# Patient Record
Sex: MALE | Race: ASIAN | Employment: OTHER | URBAN - METROPOLITAN AREA
[De-identification: names, ages, dates, MRNs, and addresses within clinical notes are randomized per-mention and may not be internally consistent; named-entity substitution may affect disease eponyms.]

---

## 2017-01-02 ENCOUNTER — THERAPY VISIT (OUTPATIENT)
Dept: PHYSICAL THERAPY | Facility: CLINIC | Age: 67
End: 2017-01-02
Payer: COMMERCIAL

## 2017-01-02 DIAGNOSIS — M54.2 CERVICALGIA: Primary | ICD-10-CM

## 2017-01-02 PROCEDURE — 97110 THERAPEUTIC EXERCISES: CPT | Mod: GP | Performed by: PHYSICAL THERAPIST

## 2017-01-02 PROCEDURE — 97161 PT EVAL LOW COMPLEX 20 MIN: CPT | Mod: GP | Performed by: PHYSICAL THERAPIST

## 2017-01-02 NOTE — PROGRESS NOTES
[facility] Initial Evaluation    Subjective:    Mati Pulliam is a 66 year old male with a cervical spine condition.  Condition occurred with:  Insidious onset.  Condition occurred: for unknown reasons.  This is a new condition  12/7/2016.    Patient reports pain:  Cervical left side.  Radiates to: prior to today he had pain into his L shoulder.  Pain is described as sharp and is intermittent and reported as 3/10.  Associated symptoms:  Loss of motion/stiffness. Worse during: varies throughout day.  Symptoms are exacerbated by lying down, rotating head and looking up or down Relieved by: aspirin.  Since onset symptoms are gradually improving.    Previous treatment includes chiropractic.  There was moderate improvement following previous treatment.  General health as reported by patient is good.  Pertinent medical history includes:  High blood pressure, heart problems, stroke and diabetes (coronary bypass surgery, stroke (2009) that affected R side).  Medical allergies: yes.  Other surgeries include:  Heart surgery and other (coronary bypass, hernia).    Current occupation is retired.        Barriers include:  None as reported by the patient.    Red flags:  None as reported by the patient.                      Objective:    System    Physical Exam    [facility] Cervical Evaluation    Posture:  Sitting: fair  Standing: fair  Protruding Head: yes        Movement Loss:    Flexion (Flex): nil  Retraction (RET): min and pain  Extension (EXT): major and pain  Lateral Flexion Right (LF R): major and pain  Lateral Flexion Left (LF L): major and pain  Rotation Right (ROT R): nil  Rotation Left (ROT L): mod and pain  Test Movements:      RET: During: produces  After: no effect  Mechanical Response: IncROM  Repeat RET: During: produces  After: no effect  Mechanical Response: IncROM  RET EXT: During: produces  After: better  Mechanical Response: IncROM  Repeat RET EXT: During: produces  After: better   Mechanical Response: IncROM            LF L: During: produces  After: worse  Mechanical Response: no effect  Repeat LF L: During: produces  After: worse  Mechanical Response: no effect            Conclusion: derangement  Principle of Treatment:      Extension: repeated cervical retraction/extension                                               ROS    Assessment/Plan:      Patient is a 66 year old male with cervical complaints.    Patient has the following significant findings with corresponding treatment plan.                Diagnosis 1:  Cervical pain secondary to derangment  Pain -  directional preference exercise and home program  Decreased ROM/flexibility - manual therapy, therapeutic exercise and home program  Decreased function - therapeutic activities and home program  Impaired posture - neuro re-education and home program    Therapy Evaluation Codes:   1) History comprised of:   Personal factors that impact the plan of care:      None.    Comorbidity factors that impact the plan of care are:      None.     Medications impacting care: None.  2) Examination of Body Systems comprised of:   Body structures and functions that impact the plan of care:      Cervical spine.   Activity limitations that impact the plan of care are:      none.   Clinical presentation characteristics are:    Stable/Uncomplicated.  3) Presentation comprised of:   Presentation scored as Low complexity with uncomplicated characteristics..  4) Decision-Making    Low complexity using standardized patient assessment instrument and/or measureable assessment of functional outcome.  Cumulative Therapy Evaluation is: Low complexity.    Previous and current functional limitations:  (See Goal Flow Sheet for this information)    Short term and Long term goals: (See Goal Flow Sheet for this information)     Communication ability:  Patient appears to be able to clearly communicate and understand verbal and written communication and follow directions  correctly.  Treatment Explanation - The following has been discussed with the patient:   RX ordered/plan of care  Anticipated outcomes  Possible risks and side effects  This patient would benefit from PT intervention to resume normal activities.   Rehab potential is excellent.    Frequency:  2 X week, once daily  Duration:  for 3 weeks  Discharge Plan:  Achieve all LTG.  Independent in home treatment program.  Reach maximal therapeutic benefit.    Please refer to the daily flowsheet for treatment today, total treatment time and time spent performing 1:1 timed codes.

## 2017-01-03 DIAGNOSIS — I10 BENIGN ESSENTIAL HYPERTENSION: Primary | ICD-10-CM

## 2017-01-03 RX ORDER — EPLERENONE 25 MG/1
25 TABLET, FILM COATED ORAL DAILY
Qty: 90 TABLET | Refills: 3 | Status: SHIPPED | OUTPATIENT
Start: 2017-01-03 | End: 2017-11-30

## 2017-01-03 NOTE — TELEPHONE ENCOUNTER
Pts wife called stating that they need an order for Eplerenone to be sent to [facility] mail delivery pharmacy. Refilled Eplerenone     12/13/16 OV with Christina   He also mentioned in passing breast tenderness.  I am sure this is the Aldactone.  He has mild gynecomastia.  I have taken the liberty of switching Aldactone to Inspra at 25 mg a day, and I am going to have him come back in 2 weeks for another blood pressure check to see if the blood pressure numbers look better and also repeat BMP on the new medicines.     12/30/16 OV with [facility]   ASSESSMENT AND PLAN:    1.  Coronary artery disease with previous coronary artery bypass grafting x4.  He is doing well without ischemic symptoms right now.  His last nuclear stress test showed no new areas of concern.  He is on medical management with Crestor 20, a low dose aspirin, beta blocker and ARB.    2.  Hypertension.  His blood pressure is elevated today.  He is currently taking spironolactone 25, losartan 100, amlodipine 10, carvedilol 6.25.  His primary has started him on chlorthalidone; initially it was 12.5 mg twice weekly.  Apparently it was up to 25 mg twice weekly, but he is not taking it at all due to the cost.  Likewise, he has not yet started allopurinol.  He plans to start both of these medications in January.  I have asked him to call my nurse early next week to have her e-script a 90-day supply to his new [facility] insurance.  After he starts the new medication, he should get a BMP about 1 week later.  I would like to see him in a month to see how his blood pressure has responded.      Pt has OV with [facility] 2/10/17. BMP ordered.

## 2017-01-05 ENCOUNTER — THERAPY VISIT (OUTPATIENT)
Dept: PHYSICAL THERAPY | Facility: CLINIC | Age: 67
End: 2017-01-05
Payer: COMMERCIAL

## 2017-01-05 DIAGNOSIS — M54.2 CERVICALGIA: Primary | ICD-10-CM

## 2017-01-05 PROCEDURE — 97110 THERAPEUTIC EXERCISES: CPT | Mod: GP | Performed by: PHYSICAL THERAPIST

## 2017-01-05 PROCEDURE — 97530 THERAPEUTIC ACTIVITIES: CPT | Mod: GP | Performed by: PHYSICAL THERAPIST

## 2017-01-05 PROCEDURE — 97140 MANUAL THERAPY 1/> REGIONS: CPT | Mod: GP | Performed by: PHYSICAL THERAPIST

## 2017-01-10 ENCOUNTER — THERAPY VISIT (OUTPATIENT)
Dept: PHYSICAL THERAPY | Facility: CLINIC | Age: 67
End: 2017-01-10
Payer: COMMERCIAL

## 2017-01-10 DIAGNOSIS — M54.2 CERVICALGIA: Primary | ICD-10-CM

## 2017-01-10 PROCEDURE — 97140 MANUAL THERAPY 1/> REGIONS: CPT | Mod: GP | Performed by: PHYSICAL THERAPIST

## 2017-01-10 PROCEDURE — 97110 THERAPEUTIC EXERCISES: CPT | Mod: GP | Performed by: PHYSICAL THERAPIST

## 2017-01-11 NOTE — TELEPHONE ENCOUNTER
Pt has received Inspra medication from pharmacy and has started it per wife.  Have asked Pt call if any issues with paying copay for medication. SEDA Hines RN

## 2017-01-11 NOTE — TELEPHONE ENCOUNTER
"Writer spoke to [facility] about bringing this question to Dr. Vasquez- Received Rx request from [facility] stating that patient would like to switch Eplerenone 25 mg tab daily to Chlorthalidone as this would save patient over $652 a year.     Dr. Vasquez 12/13/16  \"He also mentioned in passing breast tenderness.  I am sure this is the Aldactone.  He has mild gynecomastia.  I have taken the liberty of switching Aldactone to Inspra at 25 mg a day, and I am going to have him come back in 2 weeks for another blood pressure check to see if the blood pressure numbers look better and also repeat BMP on the new medicines.\"    Will route to Estela HAGAN  "

## 2017-01-11 NOTE — TELEPHONE ENCOUNTER
Received Rx request from [facility] stating that patient would like to switch Eplerenone 25 mg tab daily to Chlorthalidone as this would save patient over $652 a year.     Will route to My NP to see if pt would be able to switch medications.

## 2017-01-12 ENCOUNTER — TELEPHONE (OUTPATIENT)
Dept: CARDIOLOGY | Facility: CLINIC | Age: 67
End: 2017-01-12

## 2017-01-12 DIAGNOSIS — I63.9 CEREBROVASCULAR ACCIDENT (H): Primary | ICD-10-CM

## 2017-01-12 NOTE — TELEPHONE ENCOUNTER
Spoke with wife and Pt and they are ok with the  $ 130 every 3 month co-pay. Will attempt prior Auth on eplerenone. SEDA Hines rN

## 2017-01-17 ENCOUNTER — THERAPY VISIT (OUTPATIENT)
Dept: PHYSICAL THERAPY | Facility: CLINIC | Age: 67
End: 2017-01-17
Payer: COMMERCIAL

## 2017-01-17 DIAGNOSIS — M54.2 CERVICALGIA: Primary | ICD-10-CM

## 2017-01-17 PROCEDURE — 97110 THERAPEUTIC EXERCISES: CPT | Mod: GP | Performed by: PHYSICAL THERAPIST

## 2017-01-17 PROCEDURE — 97140 MANUAL THERAPY 1/> REGIONS: CPT | Mod: GP | Performed by: PHYSICAL THERAPIST

## 2017-01-21 NOTE — TELEPHONE ENCOUNTER
Per [employer], medication is available without authorization. Called [employer], but unable to run medication. But put a note in patients file for future references.

## 2017-01-24 ENCOUNTER — THERAPY VISIT (OUTPATIENT)
Dept: PHYSICAL THERAPY | Facility: CLINIC | Age: 67
End: 2017-01-24
Payer: COMMERCIAL

## 2017-01-24 DIAGNOSIS — M54.2 CERVICALGIA: Primary | ICD-10-CM

## 2017-01-24 PROCEDURE — 97110 THERAPEUTIC EXERCISES: CPT | Mod: GP | Performed by: PHYSICAL THERAPIST

## 2017-01-31 DIAGNOSIS — I63.9 CEREBROVASCULAR ACCIDENT (H): ICD-10-CM

## 2017-01-31 LAB
ANION GAP SERPL CALCULATED.3IONS-SCNC: 12 MMOL/L (ref 3–14)
BUN SERPL-MCNC: 13 MG/DL (ref 7–30)
CALCIUM SERPL-MCNC: 9.6 MG/DL (ref 8.5–10.1)
CHLORIDE SERPL-SCNC: 96 MMOL/L (ref 94–109)
CO2 SERPL-SCNC: 26 MMOL/L (ref 20–32)
CREAT SERPL-MCNC: 0.86 MG/DL (ref 0.66–1.25)
GFR SERPL CREATININE-BSD FRML MDRD: 90 ML/MIN/1.7M2
GLUCOSE SERPL-MCNC: 124 MG/DL (ref 70–99)
POTASSIUM SERPL-SCNC: 4.1 MMOL/L (ref 3.4–5.3)
SODIUM SERPL-SCNC: 134 MMOL/L (ref 133–144)

## 2017-01-31 PROCEDURE — 36415 COLL VENOUS BLD VENIPUNCTURE: CPT | Performed by: INTERNAL MEDICINE

## 2017-01-31 PROCEDURE — 80048 BASIC METABOLIC PNL TOTAL CA: CPT | Performed by: INTERNAL MEDICINE

## 2017-02-01 ENCOUNTER — MYC MEDICAL ADVICE (OUTPATIENT)
Dept: FAMILY MEDICINE | Facility: CLINIC | Age: 67
End: 2017-02-01

## 2017-02-02 ENCOUNTER — DOCUMENTATION ONLY (OUTPATIENT)
Dept: ENDOCRINOLOGY | Facility: CLINIC | Age: 67
End: 2017-02-02

## 2017-02-02 NOTE — PROGRESS NOTES
GRADE study visit    Patient is here for 24 month GRADE study visit. Patient is doing well and continues on Metformin 1000 mg bid in addition to randomized treatment of glimepiride.  He is currently taking 1 mg daily.   No hypoglycemic episodes recently. He otherwise has been feeling well and in his usual state of health.     Lab results:   A1c:  6.9%  LDL:56  Creatinine: 0.77  Microalbumin: 138.89- already on max dose ARB and multiple antihypertensive medications.  EKG: No changes from previous.     Plan:   1. Diabetes- A1c at target and his no longer having hypoglycemia.  No changes.  May need to cut back amaryl if he becomes more physically active this spring.   2. Follow up in 3 months, sooner with concerns.     Lulu Cortés PA-C, MPAS   [facility]  Department of Medicine  Division of Endocrinology and Diabetes

## 2017-02-10 ENCOUNTER — OFFICE VISIT (OUTPATIENT)
Dept: CARDIOLOGY | Facility: CLINIC | Age: 67
End: 2017-02-10
Attending: NURSE PRACTITIONER
Payer: COMMERCIAL

## 2017-02-10 VITALS
HEIGHT: 64 IN | DIASTOLIC BLOOD PRESSURE: 78 MMHG | SYSTOLIC BLOOD PRESSURE: 140 MMHG | HEART RATE: 56 BPM | WEIGHT: 137.4 LBS | BODY MASS INDEX: 23.46 KG/M2

## 2017-02-10 DIAGNOSIS — I25.10 CORONARY ARTERY DISEASE INVOLVING NATIVE CORONARY ARTERY OF NATIVE HEART WITHOUT ANGINA PECTORIS: ICD-10-CM

## 2017-02-10 DIAGNOSIS — I10 BENIGN ESSENTIAL HYPERTENSION: ICD-10-CM

## 2017-02-10 PROCEDURE — 99214 OFFICE O/P EST MOD 30 MIN: CPT | Performed by: NURSE PRACTITIONER

## 2017-02-10 NOTE — LETTER
2/10/2017    Hai Mckee MD  [facility]   [address]  [place] [address]    RE: Mati Pulliam       Dear Colleague,    I had the pleasure of seeing Mati Pulliam today in Cardiology Clinic.  His wife, Randell, was unable to make it because fortunately he just got a new job after being out of work for quite some time.  He is a pleasant 66-year-old patient who follows with Dr. Vasquez.  He has a history of coronary artery bypass grafting x4 after an abnormal stress test for exertional chest pain.  Postop, he had a large left MCA stroke, leaving him with aphasia and right-sided immobility from which he has almost completely recovered.  A recent nuclear stress test for atypical chest discomfort showed no evidence of ischemia or infarct, no wall motion abnormality and a normal ejection fraction.  He saw Dr. Vasquez and complained of gynecomastia and so was started on eplerenone.  His blood pressure has been a bit high.  Dr. Mckee recently started him on biweekly chlorthalidone.  His most recent carotid ultrasound showed no significant disease and minimal calcified atherosclerotic plaque.      When I saw him at our last visit to follow up on starting eplerenone and stopping spironolactone, he had not actually made any changes to his medications because of insurance and cost.  However, starting 01/01 he was able to get the medications covered, and so he stopped the spironolactone and started the eplerenone and started the chlorthalidone.  His repeat labs look good today:  Sodium 134, potassium 4.1, creatinine 0.86 and GFR of 90.  His blood pressure at home has been in the 130s and 140s.  He is a diabetic.      PHYSICAL EXAMINATION:   GENERAL:  A 66-year-old male, appears comfortable.   VITAL SIGNS:  Blood pressure 140/78, heart rate 56, weight 137 pounds, BMI is 24.   LUNGS:  Clear throughout to auscultation.   CARDIAC:  Rate regular, normal S1, S2.   EXTREMITIES:  No lower extremity edema.       Outpatient Encounter Prescriptions as of 2/10/2017   Medication Sig Dispense Refill     ranitidine (ZANTAC) 150 MG tablet Take 1 tablet (150 mg) by mouth 2 times daily 180 tablet 2     nitroglycerin (NITROSTAT) 0.4 MG sublingual tablet Place 1 tablet (0.4 mg) under the tongue See Admin Instructions for chest pain 25 tablet 3     amLODIPine (NORVASC) 10 MG tablet Take 1 tablet (10 mg) by mouth daily 90 tablet 2     clopidogrel (PLAVIX) 75 MG tablet Take 1 tablet (75 mg) by mouth daily 90 tablet 2     losartan (COZAAR) 100 MG tablet Take 1 tablet (100 mg) by mouth daily 90 tablet 2     carvedilol (COREG) 6.25 MG tablet Take 1 tablet (6.25 mg) by mouth 2 times daily (with meals) 180 tablet 2     rosuvastatin (CRESTOR) 20 MG tablet Take 1 tablet (20 mg) by mouth daily 90 tablet 2     eplerenone (INSPRA) 25 MG tablet Take 1 tablet (25 mg) by mouth daily 90 tablet 3     chlorthalidone (HYGROTON) 25 MG tablet Take 0.5 tablets (12.5 mg) by mouth twice a week 31 tablet 11     Multiple Vitamins-Minerals (AIRBORNE PO) Take by mouth daily as needed       glimepiride (AMARYL) 1 MG tablet Take 1 mg by mouth every morning (before breakfast)        metFORMIN (GLUCOPHAGE) 500 MG tablet Take 1,000 mg by mouth 2 times daily (with meals)       STUDY MEDICATION Metformin 1000 mg bid  Glimepiride 1 mg daily    Medication provided by GRADE study only.   Coordinator: Raymundo Coy -[id number]  PI: Shanon Guy -[id number]       FOLIC ACID PO Take 800 mcg by mouth every other day       fluticasone (FLONASE) 50 MCG/ACT nasal spray Spray 1-2 sprays into both nostrils daily (Patient taking differently: Spray 1-2 sprays into both nostrils daily PRN) 1 Package 11     blood glucose monitoring (ONE TOUCH ULTRA MINI) meter device kit Use to test blood sugar once daily or as directed. 1 kit      ONETOUCH DELICA LANCETS 33G MISC 1 Device daily 100 each prn     blood glucose (ONE TOUCH ULTRA) test strip Use to test blood sugars once daily as  directed. 100 strip PRN     Cholecalciferol (VITAMIN D) 2000 UNITS CAPS Take 1 capsule by mouth every other day       OVER-THE-COUNTER Beplex Forte 260  mcg every OTHER day       ASPIRIN 81 MG OR TABS ONE DAILY 100 3     No facility-administered encounter medications on file as of 2/10/2017.        ASSESSMENT AND PLAN:   1.  Coronary artery disease, status post CABG.  He is not having any ischemic symptoms and should continue his medical management with statin, aspirin and beta blockade.   2.  Hypertension.  He was having gynecomastia with the spironolactone, and so Dr. Vasquez switched him to eplerenone 25 mg daily.  His labs look okay.  His blood pressure is about the same.  He tells me that earlier this week, Dr. Mckee increased his dose of chlorthalidone from 12.5 mg twice a week to 25 mg twice a week.  I recommend that he follow up with her for further blood pressure management and that he see Dr. Vasquez in 1-year followup.      Again, thank you for allowing me to participate in the care of your patient.      Sincerely,    LIZA Prater [facility]
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
## 2017-02-11 NOTE — PROGRESS NOTES
HISTORY OF PRESENT ILLNESS:  I had the pleasure of seeing Mati Pulliam today in Cardiology Clinic.  His wife, Randell, was unable to make it because fortunately he just got a new job after being out of work for quite some time.  He is a pleasant 66-year-old patient who follows with Dr. Vasquez.  He has a history of coronary artery bypass grafting x4 after an abnormal stress test for exertional chest pain.  Postop, he had a large left MCA stroke, leaving him with aphasia and right-sided immobility from which he has almost completely recovered.  A recent nuclear stress test for atypical chest discomfort showed no evidence of ischemia or infarct, no wall motion abnormality and a normal ejection fraction.  He saw Dr. Vasquez and complained of gynecomastia and so was started on eplerenone.  His blood pressure has been a bit high.  Dr. Mckee recently started him on biweekly chlorthalidone.  His most recent carotid ultrasound showed no significant disease and minimal calcified atherosclerotic plaque.      When I saw him at our last visit to follow up on starting eplerenone and stopping spironolactone, he had not actually made any changes to his medications because of insurance and cost.  However, starting 01/01 he was able to get the medications covered, and so he stopped the spironolactone and started the eplerenone and started the chlorthalidone.  His repeat labs look good today:  Sodium 134, potassium 4.1, creatinine 0.86 and GFR of 90.  His blood pressure at home has been in the 130s and 140s.  He is a diabetic.      PHYSICAL EXAMINATION:   GENERAL:  A 66-year-old male, appears comfortable.   VITAL SIGNS:  Blood pressure 140/78, heart rate 56, weight 137 pounds, BMI is 24.   LUNGS:  Clear throughout to auscultation.   CARDIAC:  Rate regular, normal S1, S2.   EXTREMITIES:  No lower extremity edema.      ASSESSMENT AND PLAN:   1.  Coronary artery disease, status post CABG.  He is not having any ischemic symptoms and should  continue his medical management with statin, aspirin and beta blockade.   2.  Hypertension.  He was having gynecomastia with the spironolactone, and so Dr. Vasquez switched him to eplerenone 25 mg daily.  His labs look okay.  His blood pressure is about the same.  He tells me that earlier this week, Dr. Mckee increased his dose of chlorthalidone from 12.5 mg twice a week to 25 mg twice a week.  I recommend that he follow up with her for further blood pressure management and that he see Dr. Vasquez in 1-year followup.      LIZA Reynoso, ALE              D: 02/10/2017 16:51   T: 02/10/2017 22:34   MT: JUAN C      Name:     CORINE BILLY   MRN:      [id number]        Account:      [id number]   :      1950           Service Date: 02/10/2017      Document: [id number]

## 2017-05-18 ENCOUNTER — TELEPHONE (OUTPATIENT)
Dept: ENDOCRINOLOGY | Facility: CLINIC | Age: 67
End: 2017-05-18

## 2017-05-18 LAB — HBA1C MFR BLD: 6.6 % (ref 4.3–6)

## 2017-06-08 ENCOUNTER — OFFICE VISIT (OUTPATIENT)
Dept: FAMILY MEDICINE | Facility: CLINIC | Age: 67
End: 2017-06-08
Payer: COMMERCIAL

## 2017-06-08 VITALS
BODY MASS INDEX: 22.53 KG/M2 | DIASTOLIC BLOOD PRESSURE: 66 MMHG | HEART RATE: 57 BPM | WEIGHT: 132 LBS | SYSTOLIC BLOOD PRESSURE: 114 MMHG | HEIGHT: 64 IN | OXYGEN SATURATION: 97 %

## 2017-06-08 DIAGNOSIS — Z12.11 SPECIAL SCREENING FOR MALIGNANT NEOPLASMS, COLON: ICD-10-CM

## 2017-06-08 DIAGNOSIS — K21.9 GASTROESOPHAGEAL REFLUX DISEASE WITHOUT ESOPHAGITIS: ICD-10-CM

## 2017-06-08 DIAGNOSIS — E11.59 TYPE 2 DIABETES MELLITUS WITH VASCULAR DISEASE (H): Primary | ICD-10-CM

## 2017-06-08 DIAGNOSIS — I10 ESSENTIAL HYPERTENSION: ICD-10-CM

## 2017-06-08 DIAGNOSIS — Z95.1 S/P CABG (CORONARY ARTERY BYPASS GRAFT): ICD-10-CM

## 2017-06-08 DIAGNOSIS — E78.5 HYPERLIPIDEMIA LDL GOAL <70: ICD-10-CM

## 2017-06-08 DIAGNOSIS — Z12.11 SCREEN FOR COLON CANCER: ICD-10-CM

## 2017-06-08 LAB
ANION GAP SERPL CALCULATED.3IONS-SCNC: 7 MMOL/L (ref 3–14)
BUN SERPL-MCNC: 12 MG/DL (ref 7–30)
CALCIUM SERPL-MCNC: 9.3 MG/DL (ref 8.5–10.1)
CHLORIDE SERPL-SCNC: 99 MMOL/L (ref 94–109)
CHOLEST SERPL-MCNC: 106 MG/DL
CO2 SERPL-SCNC: 30 MMOL/L (ref 20–32)
CREAT SERPL-MCNC: 0.94 MG/DL (ref 0.66–1.25)
CREAT UR-MCNC: 114 MG/DL
ERYTHROCYTE [DISTWIDTH] IN BLOOD BY AUTOMATED COUNT: 13.8 % (ref 10–15)
GFR SERPL CREATININE-BSD FRML MDRD: 80 ML/MIN/1.7M2
GLUCOSE SERPL-MCNC: 147 MG/DL (ref 70–99)
HCT VFR BLD AUTO: 40.1 % (ref 40–53)
HDLC SERPL-MCNC: 39 MG/DL
HGB BLD-MCNC: 13.2 G/DL (ref 13.3–17.7)
LDLC SERPL CALC-MCNC: 35 MG/DL
MCH RBC QN AUTO: 29.6 PG (ref 26.5–33)
MCHC RBC AUTO-ENTMCNC: 32.9 G/DL (ref 31.5–36.5)
MCV RBC AUTO: 90 FL (ref 78–100)
MICROALBUMIN UR-MCNC: 138 MG/L
MICROALBUMIN/CREAT UR: 121.05 MG/G CR (ref 0–17)
NONHDLC SERPL-MCNC: 67 MG/DL
PLATELET # BLD AUTO: 217 10E9/L (ref 150–450)
POTASSIUM SERPL-SCNC: 3.9 MMOL/L (ref 3.4–5.3)
RBC # BLD AUTO: 4.46 10E12/L (ref 4.4–5.9)
SODIUM SERPL-SCNC: 136 MMOL/L (ref 133–144)
TRIGL SERPL-MCNC: 159 MG/DL
TSH SERPL DL<=0.005 MIU/L-ACNC: 1.48 MU/L (ref 0.4–4)
WBC # BLD AUTO: 7.1 10E9/L (ref 4–11)

## 2017-06-08 PROCEDURE — 85027 COMPLETE CBC AUTOMATED: CPT | Performed by: INTERNAL MEDICINE

## 2017-06-08 PROCEDURE — 80048 BASIC METABOLIC PNL TOTAL CA: CPT | Performed by: INTERNAL MEDICINE

## 2017-06-08 PROCEDURE — 99214 OFFICE O/P EST MOD 30 MIN: CPT | Performed by: INTERNAL MEDICINE

## 2017-06-08 PROCEDURE — 80061 LIPID PANEL: CPT | Performed by: INTERNAL MEDICINE

## 2017-06-08 PROCEDURE — 84443 ASSAY THYROID STIM HORMONE: CPT | Performed by: INTERNAL MEDICINE

## 2017-06-08 PROCEDURE — 82043 UR ALBUMIN QUANTITATIVE: CPT | Performed by: INTERNAL MEDICINE

## 2017-06-08 PROCEDURE — 36415 COLL VENOUS BLD VENIPUNCTURE: CPT | Performed by: INTERNAL MEDICINE

## 2017-06-08 PROCEDURE — 99207 C FOOT EXAM  NO CHARGE: CPT | Performed by: INTERNAL MEDICINE

## 2017-06-08 NOTE — LETTER
[place] [address] [address] [facility] [address]  [place] [address]  [phone]                                                                                               Date: 6/9/2017    Mati Pulliam                                                                               [address] [facility] [address]              Dear Christine Thorne is a copy of your results.      It was a pleasure to see you at your last appointment. If you have any questions, please feel free to call myself or my nurse at [phone].          Sincerely,    Hai Mckee MD  Results for orders placed or performed in visit on 06/08/17   Basic metabolic panel   Result Value Ref Range    Sodium 136 133 - 144 mmol/L    Potassium 3.9 3.4 - 5.3 mmol/L    Chloride 99 94 - 109 mmol/L    Carbon Dioxide 30 20 - 32 mmol/L    Anion Gap 7 3 - 14 mmol/L    Glucose 147 (H) 70 - 99 mg/dL    Urea Nitrogen 12 7 - 30 mg/dL    Creatinine 0.94 0.66 - 1.25 mg/dL    GFR Estimate 80 >60 mL/min/1.7m2    GFR Estimate If Black >90   GFR Calc   >60 mL/min/1.7m2    Calcium 9.3 8.5 - 10.1 mg/dL   Lipid panel reflex to direct LDL   Result Value Ref Range    Cholesterol 106 <200 mg/dL    Triglycerides 159 (H) <150 mg/dL    HDL Cholesterol 39 (L) >39 mg/dL    LDL Cholesterol Calculated 35 <100 mg/dL    Non HDL Cholesterol 67 <130 mg/dL   CBC with platelets   Result Value Ref Range    WBC 7.1 4.0 - 11.0 10e9/L    RBC Count 4.46 4.4 - 5.9 10e12/L    Hemoglobin 13.2 (L) 13.3 - 17.7 g/dL    Hematocrit 40.1 40.0 - 53.0 %    MCV 90 78 - 100 fl    MCH 29.6 26.5 - 33.0 pg    MCHC 32.9 31.5 - 36.5 g/dL    RDW 13.8 10.0 - 15.0 %    Platelet Count 217 150 - 450 10e9/L   Albumin Random Urine Quantitative   Result Value Ref Range    Creatinine Urine 114 mg/dL    Albumin Urine mg/L 138 mg/L    Albumin Urine mg/g Cr 121.05 (H) 0 - 17 mg/g Cr   TSH with free T4 reflex   Result Value Ref Range    TSH 1.48 0.40 - 4.00 mU/L
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
## 2017-06-08 NOTE — NURSING NOTE
"Chief Complaint   Patient presents with     Diabetes     Hypertension     Lipids     Gastrophageal Reflux       Initial /66 (BP Location: Left arm, Patient Position: Chair, Cuff Size: Adult Regular)  Pulse 57  Ht 5' 3.5\" (1.613 m)  Wt 132 lb (59.9 kg)  SpO2 97%  BMI 23.02 kg/m2 Estimated body mass index is 23.02 kg/(m^2) as calculated from the following:    Height as of this encounter: 5' 3.5\" (1.613 m).    Weight as of this encounter: 132 lb (59.9 kg).  Medication Reconciliation: complete  "

## 2017-06-08 NOTE — MR AVS SNAPSHOT
After Visit Summary   6/8/2017    Mati Pulliam    MRN: [id number]           Patient Information     Date Of Birth          1950        Visit Information        Provider Department      6/8/2017 3:30 PM Hai Mckee MD [facility]        Today's Diagnoses     Type 2 diabetes mellitus with vascular disease (H)    -  1    S/P CABG (coronary artery bypass graft)        Gastroesophageal reflux disease without esophagitis        Essential hypertension        Special screening for malignant neoplasms, colon        Screen for colon cancer        Hyperlipidemia LDL goal <70           Follow-ups after your visit        Future tests that were ordered for you today     Open Future Orders        Priority Expected Expires Ordered    Fecal colorectal cancer screen (FIT) Routine 6/29/2017 8/31/2017 6/8/2017            Who to contact     If you have questions or need follow up information about today's clinic visit or your schedule please contact [facility] directly at [phone].  Normal or non-critical lab and imaging results will be communicated to you by [employer] System[employer], letter or phone within 4 business days after the clinic has received the results. If you do not hear from us within 7 days, please contact the clinic through Telligent Systemshart or phone. If you have a critical or abnormal lab result, we will notify you by phone as soon as possible.  Submit refill requests through [email or web address] or call your pharmacy and they will forward the refill request to us. Please allow 3 business days for your refill to be completed.          Additional Information About Your Visit        MyChart Information     [email or web address] gives you secure access to your electronic health record. If you see a primary care provider, you can also send messages to your care team and make appointments. If you have questions, please call your primary care clinic.  If you do not have a primary care provider, please call [phone] and they  "will assist you.        Care EveryWhere ID     This is your Care EveryWhere ID. This could be used by other organizations to access your [place] medical records  [id number]        Your Vitals Were     Pulse Height Pulse Oximetry BMI (Body Mass Index)          57 5' 3.5\" (1.613 m) 97% 23.02 kg/m2         Blood Pressure from Last 3 Encounters:   06/08/17 114/66   02/10/17 140/78   12/30/16 134/62    Weight from Last 3 Encounters:   06/08/17 132 lb (59.9 kg)   02/10/17 137 lb 6.4 oz (62.3 kg)   12/30/16 133 lb 6.4 oz (60.5 kg)              We Performed the Following     Albumin Random Urine Quantitative     Basic metabolic panel     CBC with platelets     FOOT EXAM  NO CHARGE [58674.114]     Lipid panel reflex to direct LDL     TSH with free T4 reflex          Today's Medication Changes          These changes are accurate as of: 6/8/17  3:45 PM.  If you have any questions, ask your nurse or doctor.               These medicines have changed or have updated prescriptions.        Dose/Directions    fluticasone 50 MCG/ACT spray   Commonly known as:  FLONASE   This may have changed:  additional instructions   Used for:  Cough        Dose:  1-2 spray   Spray 1-2 sprays into both nostrils daily   Quantity:  1 Package   Refills:  11         Stop taking these medicines if you haven't already. Please contact your care team if you have questions.     [place] PO   Stopped by:  Hai Mckee MD                    Primary Care Provider Office Phone # Fax #    Hai Mckee [phone]       [facility] [address] [address]        Thank you!     Thank you for choosing [facility]  for your care. Our goal is always to provide you with excellent care. Hearing back from our patients is one way we can continue to improve our services. Please take a few minutes to complete the written survey that you may receive in the mail after your visit with us. Thank you!           "   Your Updated Medication List - Protect others around you: Learn how to safely use, store and throw away your medicines at www.disposemymeds.org.          This list is accurate as of: 6/8/17  3:45 PM.  Always use your most recent med list.                   Brand Name Dispense Instructions for use    amLODIPine 10 MG tablet    NORVASC    90 tablet    Take 1 tablet (10 mg) by mouth daily       aspirin 81 MG tablet     100    ONE DAILY       blood glucose monitoring meter device kit     1 kit    Use to test blood sugar once daily or as directed.       blood glucose monitoring test strip    ONE TOUCH ULTRA    100 strip    Use to test blood sugars once daily as directed.       carvedilol 6.25 MG tablet    COREG    180 tablet    Take 1 tablet (6.25 mg) by mouth 2 times daily (with meals)       chlorthalidone 25 MG tablet    HYGROTON    31 tablet    Take 0.5 tablets (12.5 mg) by mouth twice a week       clopidogrel 75 MG tablet    PLAVIX    90 tablet    Take 1 tablet (75 mg) by mouth daily       eplerenone 25 MG tablet    INSPRA    90 tablet    Take 1 tablet (25 mg) by mouth daily       fluticasone 50 MCG/ACT spray    FLONASE    1 Package    Spray 1-2 sprays into both nostrils daily       FOLIC ACID PO      Take 800 mcg by mouth every other day       glimepiride 1 MG tablet    AMARYL     Take 1 mg by mouth every morning (before breakfast)       losartan 100 MG tablet    COZAAR    90 tablet    Take 1 tablet (100 mg) by mouth daily       metFORMIN 500 MG tablet    GLUCOPHAGE     Take 1,000 mg by mouth 2 times daily (with meals)       nitroglycerin 0.4 MG sublingual tablet    NITROSTAT    25 tablet    Place 1 tablet (0.4 mg) under the tongue See Admin Instructions for chest pain       ONETOUCH DELICA LANCETS 33G Misc     100 each    1 Device daily       OVER-THE-COUNTER      Beplex Forte 260  mcg every OTHER day       ranitidine 150 MG tablet    ZANTAC    180 tablet    Take 1 tablet (150 mg) by mouth 2 times daily        rosuvastatin 20 MG tablet    CRESTOR    90 tablet    Take 1 tablet (20 mg) by mouth daily       STUDY MEDICATION      Metformin 1000 mg bid Glimepiride 1 mg daily  Medication provided by GRADE study only.  Coordinator: Raymundo Coy -[id number] PI: Shanon Guy -[id number]       vitamin D 2000 UNITS Caps      Take 1 capsule by mouth every other day

## 2017-08-07 ENCOUNTER — TELEPHONE (OUTPATIENT)
Dept: CARDIOLOGY | Facility: CLINIC | Age: 67
End: 2017-08-07

## 2017-08-07 NOTE — TELEPHONE ENCOUNTER
Received refill fax from [facility] for inspra. Per EPIC notes, medication was ordered 12/30/16 and patient should have 2 refills left. Called [facility] pharmacy, per their notes patient canceled the med order on 1/4/17 and never reinstated this. Per telephone note 1/12/17, patient agreed to pay the cost of the inspra with [employer], however [facility] did not hear back from patient to re-instate the order. Reviewed with Team 6 as patient may not have been taking this medication as ordered.

## 2017-08-08 ENCOUNTER — TELEPHONE (OUTPATIENT)
Dept: FAMILY MEDICINE | Facility: CLINIC | Age: 67
End: 2017-08-08

## 2017-08-08 DIAGNOSIS — Z95.1 S/P CABG (CORONARY ARTERY BYPASS GRAFT): ICD-10-CM

## 2017-08-08 DIAGNOSIS — E78.5 HYPERLIPIDEMIA LDL GOAL <130: ICD-10-CM

## 2017-08-08 DIAGNOSIS — Z86.73 HISTORY OF STROKE: Primary | ICD-10-CM

## 2017-08-08 DIAGNOSIS — I10 ESSENTIAL HYPERTENSION: ICD-10-CM

## 2017-08-08 DIAGNOSIS — K21.9 GASTROESOPHAGEAL REFLUX DISEASE WITHOUT ESOPHAGITIS: ICD-10-CM

## 2017-08-08 DIAGNOSIS — K21.9 GERD (GASTROESOPHAGEAL REFLUX DISEASE): ICD-10-CM

## 2017-08-08 RX ORDER — CARVEDILOL 6.25 MG/1
6.25 TABLET ORAL 2 TIMES DAILY WITH MEALS
Qty: 180 TABLET | Refills: 2 | Status: SHIPPED | OUTPATIENT
Start: 2017-08-08 | End: 2017-11-30

## 2017-08-08 RX ORDER — CLOPIDOGREL BISULFATE 75 MG/1
75 TABLET ORAL DAILY
Qty: 90 TABLET | Refills: 2 | Status: SHIPPED | OUTPATIENT
Start: 2017-08-08 | End: 2017-11-30

## 2017-08-08 RX ORDER — LOSARTAN POTASSIUM 100 MG/1
100 TABLET ORAL DAILY
Qty: 90 TABLET | Refills: 2 | Status: SHIPPED | OUTPATIENT
Start: 2017-08-08 | End: 2017-11-30

## 2017-08-08 RX ORDER — AMLODIPINE BESYLATE 10 MG/1
10 TABLET ORAL DAILY
Qty: 90 TABLET | Refills: 2 | Status: SHIPPED | OUTPATIENT
Start: 2017-08-08 | End: 2017-11-30

## 2017-08-08 RX ORDER — ROSUVASTATIN CALCIUM 20 MG/1
20 TABLET, COATED ORAL DAILY
Qty: 90 TABLET | Refills: 2 | Status: SHIPPED | OUTPATIENT
Start: 2017-08-08 | End: 2017-11-30

## 2017-08-08 NOTE — TELEPHONE ENCOUNTER
I phoned [facility] and spoke to the pharmacist and informed her that he had enough refills until December 8 2017.  Mayelin Mills CMA  Patient informed.  Mayelin Mills CMA

## 2017-08-08 NOTE — TELEPHONE ENCOUNTER
Patient was having difficulty getting his refills sent from retail pharmacy to [facility]. So, he is needing refills of all medications Dr Mckee prescribes sent to [facility] directly.     Jamil Prieto, CMA

## 2017-08-08 NOTE — TELEPHONE ENCOUNTER
Reason for Call:  Medication or medication refill:  CHLORTHALIDONE 25 MG TAB    Do you use a [facility]?  Name of the pharmacy and phone number for the current request:       [facility] [address]    Name of the medication requested: CHLORTHALIDONE 25 MG TAB / 90 days    Other request: patient came to  / raised voice and frustrated with pharmacy.    Can we leave a detailed message on this number? YES    Phone number patient can be reached at: Cell number on file:    Telephone Information:   Mobile [phone]       Best Time: anytime    Call taken on 8/8/2017 at 3:32 PM by Vaughn León

## 2017-08-17 ENCOUNTER — DOCUMENTATION ONLY (OUTPATIENT)
Dept: ENDOCRINOLOGY | Facility: CLINIC | Age: 67
End: 2017-08-17

## 2017-08-17 LAB
HBA1C MFR BLD: 6.3 % (ref 0–5.7)
Lab: 127.54
VIT B12 SERPL-MCNC: 169 PG/ML

## 2017-08-17 NOTE — PROGRESS NOTES
GRADE study visit     Patient is here for 30 month [facility] study visit. Patient is doing well and continues on Metformin 1000 mg bid in addition to randomized treatment of glimepiride.  He is currently taking 1 mg daily.   He had one hypoglycemic episode where he felt very shaky after taking down curtains.  He ate a lot and felt better.  Did not test his blood sugar.  Just moved in with his son in [place] and he likes this a lot.  Still watching his grandchildren once a week.  He otherwise has been feeling well and in his usual state of health.      Lab results:   A1c: 6.3%  Albumin:creatinine ratio: 127.54 mg/g Cr  Vitamin B12: 169      Plan:   1. Diabetes- A1c at target.  No changes.   2.  HTN- blood pressure is high today despite 5 antihypertensive agents.  Suggested limiting his salt (has been high lately).  He will f/u with Dr. Mckee or his cardiologist about BP management.  He does have moderate albuminuria.   3. Vitamin B12 deficiency- Will start supplement of 1000 daily and recheck in 6 months.   4. Follow up in 3 months, sooner with concerns.      Lulu Cortés PA-C, MPAS   [facility]  Department of Medicine  Division of Endocrinology and Diabetes

## 2017-10-19 ENCOUNTER — TRANSFERRED RECORDS (OUTPATIENT)
Dept: HEALTH INFORMATION MANAGEMENT | Facility: CLINIC | Age: 67
End: 2017-10-19

## 2017-11-02 ENCOUNTER — TELEPHONE (OUTPATIENT)
Dept: FAMILY MEDICINE | Facility: CLINIC | Age: 67
End: 2017-11-02

## 2017-11-02 NOTE — TELEPHONE ENCOUNTER
Reason for Call:  Form, our goal is to have forms completed with 72 hours, however, some forms may require a visit or additional information.    Type of letter, form or note:  Physicians Report    Who is the form from?: Patient    Where did the form come from: Patient or family brought in       What clinic location was the form placed at?: [facility]    Where the form was placed: Given to MA/RN Black forms Box    What number is listed as a contact on the form?: [phone]       Additional comments: please call when Forms are complete    Call taken on 11/2/2017 at 3:27 PM by Maggie Ricketts

## 2017-11-16 ENCOUNTER — DOCUMENTATION ONLY (OUTPATIENT)
Dept: ENDOCRINOLOGY | Facility: CLINIC | Age: 67
End: 2017-11-16

## 2017-11-16 LAB — HBA1C MFR BLD: 6.4 % (ref 0–5.7)

## 2017-11-16 NOTE — PROGRESS NOTES
GRADE study visit     Patient is here for 33 month GRADE study visit. Patient is doing well and continues on Metformin 1000 mg bid in addition to randomized treatment of glimepiride.  He is currently taking 1 mg daily.   He had one hypoglycemic episode where he felt very shaky and confused.  His wife was quite worried.  He did not have glucose tablets with him.  Blood sugars have been mostly in the 90s and low 100s otherwise.  Just had a new granddaughter.    He otherwise has been feeling well and in his usual state of health.      Lab results:   A1c: 6.4%     Plan:   1. Diabetes- A1c at target, but having too much hypoglycemia.  Will reduce glimepiride to 0.5 mg daily.   2. Vitamin B12 deficiency- currently on 1000 daily and recheck in 3 months.   3. Follow up in 3 months, sooner with concerns.      Lulu Cortés PA-C, MPAS   [facility]  Department of Medicine  Division of Endocrinology and Diabetes

## 2017-11-30 ENCOUNTER — OFFICE VISIT (OUTPATIENT)
Dept: FAMILY MEDICINE | Facility: CLINIC | Age: 67
End: 2017-11-30
Payer: COMMERCIAL

## 2017-11-30 VITALS
WEIGHT: 133 LBS | DIASTOLIC BLOOD PRESSURE: 76 MMHG | OXYGEN SATURATION: 98 % | TEMPERATURE: 97.6 F | HEART RATE: 52 BPM | BODY MASS INDEX: 22.71 KG/M2 | SYSTOLIC BLOOD PRESSURE: 137 MMHG | HEIGHT: 64 IN

## 2017-11-30 DIAGNOSIS — E11.59 TYPE 2 DIABETES MELLITUS WITH VASCULAR DISEASE (H): Primary | ICD-10-CM

## 2017-11-30 DIAGNOSIS — Z86.73 HISTORY OF STROKE: ICD-10-CM

## 2017-11-30 DIAGNOSIS — Z23 NEED FOR PROPHYLACTIC VACCINATION AND INOCULATION AGAINST INFLUENZA: ICD-10-CM

## 2017-11-30 DIAGNOSIS — K21.9 GASTROESOPHAGEAL REFLUX DISEASE WITHOUT ESOPHAGITIS: ICD-10-CM

## 2017-11-30 DIAGNOSIS — E78.5 HYPERLIPIDEMIA LDL GOAL <70: ICD-10-CM

## 2017-11-30 DIAGNOSIS — Z95.1 S/P CABG (CORONARY ARTERY BYPASS GRAFT): ICD-10-CM

## 2017-11-30 DIAGNOSIS — R80.9 MICROALBUMINURIA: ICD-10-CM

## 2017-11-30 DIAGNOSIS — Z12.11 SCREEN FOR COLON CANCER: ICD-10-CM

## 2017-11-30 DIAGNOSIS — I10 BENIGN ESSENTIAL HYPERTENSION: ICD-10-CM

## 2017-11-30 PROCEDURE — 90686 IIV4 VACC NO PRSV 0.5 ML IM: CPT | Performed by: INTERNAL MEDICINE

## 2017-11-30 PROCEDURE — 90471 IMMUNIZATION ADMIN: CPT | Performed by: INTERNAL MEDICINE

## 2017-11-30 PROCEDURE — 99214 OFFICE O/P EST MOD 30 MIN: CPT | Mod: 25 | Performed by: INTERNAL MEDICINE

## 2017-11-30 RX ORDER — CHLORTHALIDONE 25 MG/1
12.5 TABLET ORAL
Qty: 45 TABLET | Refills: 11 | Status: SHIPPED | OUTPATIENT
Start: 2017-11-30 | End: 2018-06-14

## 2017-11-30 RX ORDER — EPLERENONE 25 MG/1
25 TABLET, FILM COATED ORAL DAILY
Qty: 90 TABLET | Refills: 3 | Status: SHIPPED | OUTPATIENT
Start: 2017-11-30 | End: 2018-12-06

## 2017-11-30 RX ORDER — ROSUVASTATIN CALCIUM 20 MG/1
20 TABLET, COATED ORAL DAILY
Qty: 90 TABLET | Refills: 2 | Status: SHIPPED | OUTPATIENT
Start: 2017-11-30 | End: 2018-11-16

## 2017-11-30 RX ORDER — CLOPIDOGREL BISULFATE 75 MG/1
75 TABLET ORAL DAILY
Qty: 90 TABLET | Refills: 2 | Status: SHIPPED | OUTPATIENT
Start: 2017-11-30 | End: 2018-12-06

## 2017-11-30 RX ORDER — AMLODIPINE BESYLATE 10 MG/1
10 TABLET ORAL DAILY
Qty: 90 TABLET | Refills: 2 | Status: SHIPPED | OUTPATIENT
Start: 2017-11-30 | End: 2018-07-09

## 2017-11-30 RX ORDER — CARVEDILOL 6.25 MG/1
6.25 TABLET ORAL 2 TIMES DAILY WITH MEALS
Qty: 180 TABLET | Refills: 2 | Status: SHIPPED | OUTPATIENT
Start: 2017-11-30 | End: 2018-07-05

## 2017-11-30 RX ORDER — OMEPRAZOLE 40 MG/1
40 CAPSULE, DELAYED RELEASE ORAL DAILY
Qty: 31 CAPSULE | Refills: 1 | Status: SHIPPED | OUTPATIENT
Start: 2017-11-30 | End: 2018-06-14

## 2017-11-30 RX ORDER — LOSARTAN POTASSIUM 100 MG/1
100 TABLET ORAL DAILY
Qty: 90 TABLET | Refills: 2 | Status: SHIPPED | OUTPATIENT
Start: 2017-11-30 | End: 2018-12-06

## 2017-11-30 NOTE — PATIENT INSTRUCTIONS
Chronic cough is defined as lasting eight weeks or longer in adults, four weeks in children.   While it can sometimes be difficult to pinpoint the problem that's triggering a chronic cough, the most common causes are tobacco use, postnasal drip, asthma and acid reflux   the backflow of stomach acid that can irritate your throat. Chronic cough typically disappears once the underlying problem is treated.   A chronic cough can occur with other signs and symptoms, which may include:   A runny or stuffy nose   A feeling of liquid running down the back of your throat   Frequent throat clearing and sore throat   Hoarseness   Wheezing and shortness of breath   Heartburn or a sour taste in your mouth   In rare cases, coughing up blood  When to see a doctor  See your doctor if you have a cough that lingers for weeks, especially one that brings up sputum or blood, disturbs your sleep or affects your work.   An occasional cough is normal   it helps clear foreign substances and secretions from your lungs and prevents infection. But a cough that persists for weeks is usually the result of an underlying problem. In many cases, more than one cause is involved.   Major causes  Postnasal drip. When your nose or sinuses produce extra mucus, it can drip down the back of your throat and trigger your cough reflex. This condition is also called upper airway cough syndrome.   Asthma. An asthma-related cough may come and go with the seasons, appear after an upper respiratory tract infection, or become worse when you're exposed to cold air or certain chemicals or fragrances. In one type of asthma (cough-variant asthma), a cough is the main symptom.   Gastroesophageal reflux disease (GERD). In this common condition, stomach acid flows back into the tube that connects your stomach and throat (esophagus). The constant irritation can lead to chronic coughing. The coughing, in turn, worsens GERD   a vicious cycle.  Studies have shown that the  above three causes, alone or in combination, are responsible for 90 percent of cases of chronic coughs.   Other causes  Infections. A cough can linger long after most symptoms of a cold, influenza, pneumonia or other infection of the upper respiratory tract have gone away. A not uncommon cause of a chronic cough in adults is pertussis, also known as whooping cough.   Blood pressure drugs. Angiotensin-converting enzyme (ACE) inhibitors, which are commonly prescribed for high blood pressure and heart failure, are known to cause chronic cough in some people.   Chronic bronchitis. This long-standing inflammation of your major airways (bronchial tubes) can cause congestion, breathlessness, wheezing and a cough that brings up discolored sputum. Most people with chronic bronchitis are current or former smokers.  Less common  Aspiration   Bronchiectasis   Bronchiolitis   Chronic bronchitis from an infectious disease   COPD   Cystic fibrosis   Foreign body aspiration   children   Laryngopharyngeal reflux   Lung cancer   Nonasthmatic eosinophilic bronchitis   Sarcoidosis  Being a current or former smoker is one of the leading risk factors for chronic cough. Frequent exposure to secondhand smoke also can lead to coughing and lung damage.   Women tend to have more-sensitive cough reflexes, so they're more likely to develop a chronic cough than are men.   Having a persistent cough can be exhausting. Coughing attacks can disrupt your sleep and cause a variety of other problems, including:   Headache   Dizziness   Excessive sweating   Urinary incontinence   Fractured ribs  While you may initially consult your family physician, he or she may refer you to a doctor who specializes in lung disorders (pulmonologist).   What you can do  It's a good idea to write a list that includes:   Detailed descriptions of your symptoms   Information about medical problems you've had   Information about the medical problems of your parents or  siblings   All the medications and dietary supplements you take, including over-the-counter drugs   Questions you want to ask the doctor  What to expect from your doctor  A thorough medical history and physical exam can provide important clues about a chronic cough. Your doctor may ask some of the following questions:   When did your symptoms begin?   Do you now or have you ever smoked tobacco?   Does anyone in your family or workplace smoke?   Do you have heartburn?   Do you cough up anything? If so, what does it look like?   Do you take blood pressure medicine? If so, what type do you take?   When does your cough occur? After meals? At night?   Does anything relieve your cough?   Do you get more short of breath with exertion? Or on exposure to cold air?   What is your travel history?  Your medical history and physical examination help determine which tests your doctor will order. The goal of testing is to identify the underlying cause of your chronic cough.   Rather than testing, many doctors will try treating you for one of the common causes of chronic cough. Only if the treatments aren't successful will they begin testing for more unusual causes.   Imaging tests  X-rays. Although a routine chest X-ray won't reveal the most common reasons for a cough   postnasal drip, acid reflux or asthma   it may be used to check for lung cancer and pneumonia and other lung diseases. An X-ray of your sinuses may reveal evidence of a sinus infection.   Computerized tomography (CT) scans. CT scans also may be used to check your sinus cavities for pockets of infection.  Lung function tests  These simple, noninvasive tests measure how much air your lungs can hold and how fast you can exhale. This test is required to diagnose asthma. Sometimes you may also have an asthma challenge test, which checks how well you can breathe before and after inhaling a drug called methacholine (Provocholine).   Lab tests  If the mucus that you cough  up is discolored, your doctor may want to test a sample of it for bacteria.   Scope tests  If your doctor isn't able to find an explanation for your cough, special scope tests may be considered to look for rare causes. These tests use a thin, flexible tube equipped with a light and camera to visualize structures within your body. This equipment can be inserted into your windpipe (trachea) and bronchi to look for abnormalities, as well as biopsy the inside lining of your airway (mucosa) to look for any cellular abnormalities.   Children  A chest X-ray and a spirometry test, at a minimum, are recommended to evaluate the cause of a chronic cough in a child.   Determining the cause of chronic cough is crucial to effective treatment. In many cases, more than one underlying condition may be causing your chronic cough.   If you're taking an angiotensin-converting enzyme (ACE) inhibitor medication, switch to an angiotensin-receptor blocker, which doesn't have a cough as a side effect.   Medications used to treat chronic cough may include:   Antihistamines and decongestants. These drugs are standard treatment for allergies and postnasal drip.   Inhaled asthma drugs. The most effective treatments for asthma-related cough are inhaled medications that reduce inflammation and widen your airways.   Antibiotics. If a bacterial infection is causing your chronic cough, antibiotics will be prescribed.   Acid blockers. When lifestyle changes don't take care of acid reflux, you may be treated with medications that block acid production. Some people need surgery to resolve the problem.   Cough suppressants. If the reason for your cough can't be determined, your doctor may prescribe a cough suppressant, especially if your cough is interfering with your sleep. However, there's no evidence showing they are effective. They should not be used in children because they may be harmful. A teaspoon (5 grams) of honey in warm water is a good home  remedy to loosen up sputum (though it shouldn't be used in children younger than age 1).  A cautious approach on medications is recommended for children, as medications are generally not effective in relieving a nonspecific cough in children.   In many cases, there are measures you can take at home to help ease your chronic cough. Examples include:   Avoid allergens. If your chronic cough can be traced back to nasal allergies, try to avoid the substances that cause your symptoms.   Quit smoking. The most common cause of chronic bronchitis, smoking irritates your lungs and can worsen coughs caused by other factors.   Reduce acid reflux. A cough caused by acid reflux can often be treated with lifestyle changes alone. This includes eating smaller, more frequent meals; waiting three to four hours after a meal before lying down; and elevating the head of the bed at night.  References

## 2017-11-30 NOTE — NURSING NOTE
Injectable Influenza Immunization Documentation    1.  Is the person to be vaccinated sick today?  No    2. Does the person to be vaccinated have an allergy to eggs or to a component of the vaccine?  No    3. Has the person to be vaccinated today ever had a serious reaction to influenza vaccine in the past?  No    4. Has the person to be vaccinated ever had Guillain-Menlo syndrome?  No     Form completed verbally with patient. Rosalind CONDE MA
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
## 2017-11-30 NOTE — NURSING NOTE
"Chief Complaint   Patient presents with     Diabetes     is taking HALF tab of glimpriride  0.5 mg     Hypertension     Lipids     last  lipids 6/2017.  NON FASTING today        Initial /66 (BP Location: Left arm, Cuff Size: Adult Regular)  Pulse 52  Temp 97.6  F (36.4  C) (Oral)  Ht 5' 3.5\" (1.613 m)  Wt 133 lb (60.3 kg)  SpO2 98%  BMI 23.19 kg/m2 Estimated body mass index is 23.19 kg/(m^2) as calculated from the following:    Height as of this encounter: 5' 3.5\" (1.613 m).    Weight as of this encounter: 133 lb (60.3 kg).  Medication Reconciliation: complete    "

## 2017-11-30 NOTE — MR AVS SNAPSHOT
After Visit Summary   11/30/2017    Mati Pulliam    MRN: [id number]           Patient Information     Date Of Birth          1950        Visit Information        Provider Department      11/30/2017 1:30 PM Hai Mckee MD [facility]        Today's Diagnoses     Type 2 diabetes mellitus with vascular disease (H)    -  1    Hyperlipidemia LDL goal <70        S/P CABG (coronary artery bypass graft)        Benign essential hypertension        Microalbuminuria        History of stroke        Hyperlipidemia LDL goal <130        Gastroesophageal reflux disease without esophagitis        Need for prophylactic vaccination and inoculation against influenza        Screen for colon cancer          Care Instructions    Chronic cough is defined as lasting eight weeks or longer in adults, four weeks in children.   While it can sometimes be difficult to pinpoint the problem that's triggering a chronic cough, the most common causes are tobacco use, postnasal drip, asthma and acid reflux -- the backflow of stomach acid that can irritate your throat. Chronic cough typically disappears once the underlying problem is treated.   A chronic cough can occur with other signs and symptoms, which may include:   A runny or stuffy nose   A feeling of liquid running down the back of your throat   Frequent throat clearing and sore throat   Hoarseness   Wheezing and shortness of breath   Heartburn or a sour taste in your mouth   In rare cases, coughing up blood  When to see a doctor  See your doctor if you have a cough that lingers for weeks, especially one that brings up sputum or blood, disturbs your sleep or affects your work.   An occasional cough is normal -- it helps clear foreign substances and secretions from your lungs and prevents infection. But a cough that persists for weeks is usually the result of an underlying problem. In many cases, more than one cause is involved.   Major causes  Postnasal drip.  When your nose or sinuses produce extra mucus, it can drip down the back of your throat and trigger your cough reflex. This condition is also called upper airway cough syndrome.   Asthma. An asthma-related cough may come and go with the seasons, appear after an upper respiratory tract infection, or become worse when you're exposed to cold air or certain chemicals or fragrances. In one type of asthma (cough-variant asthma), a cough is the main symptom.   Gastroesophageal reflux disease (GERD). In this common condition, stomach acid flows back into the tube that connects your stomach and throat (esophagus). The constant irritation can lead to chronic coughing. The coughing, in turn, worsens GERD -- a vicious cycle.  Studies have shown that the above three causes, alone or in combination, are responsible for 90 percent of cases of chronic coughs.   Other causes  Infections. A cough can linger long after most symptoms of a cold, influenza, pneumonia or other infection of the upper respiratory tract have gone away. A not uncommon cause of a chronic cough in adults is pertussis, also known as whooping cough.   Blood pressure drugs. Angiotensin-converting enzyme (ACE) inhibitors, which are commonly prescribed for high blood pressure and heart failure, are known to cause chronic cough in some people.   Chronic bronchitis. This long-standing inflammation of your major airways (bronchial tubes) can cause congestion, breathlessness, wheezing and a cough that brings up discolored sputum. Most people with chronic bronchitis are current or former smokers.  Less common  Aspiration   Bronchiectasis   Bronchiolitis   Chronic bronchitis from an infectious disease   COPD   Cystic fibrosis   Foreign body aspiration -- children   Laryngopharyngeal reflux   Lung cancer   Nonasthmatic eosinophilic bronchitis   Sarcoidosis  Being a current or former smoker is one of the leading risk factors for chronic cough. Frequent exposure to  secondhand smoke also can lead to coughing and lung damage.   Women tend to have more-sensitive cough reflexes, so they're more likely to develop a chronic cough than are men.   Having a persistent cough can be exhausting. Coughing attacks can disrupt your sleep and cause a variety of other problems, including:   Headache   Dizziness   Excessive sweating   Urinary incontinence   Fractured ribs  While you may initially consult your family physician, he or she may refer you to a doctor who specializes in lung disorders (pulmonologist).   What you can do  It's a good idea to write a list that includes:   Detailed descriptions of your symptoms   Information about medical problems you've had   Information about the medical problems of your parents or siblings   All the medications and dietary supplements you take, including over-the-counter drugs   Questions you want to ask the doctor  What to expect from your doctor  A thorough medical history and physical exam can provide important clues about a chronic cough. Your doctor may ask some of the following questions:   When did your symptoms begin?   Do you now or have you ever smoked tobacco?   Does anyone in your family or workplace smoke?   Do you have heartburn?   Do you cough up anything? If so, what does it look like?   Do you take blood pressure medicine? If so, what type do you take?   When does your cough occur? After meals? At night?   Does anything relieve your cough?   Do you get more short of breath with exertion? Or on exposure to cold air?   What is your travel history?  Your medical history and physical examination help determine which tests your doctor will order. The goal of testing is to identify the underlying cause of your chronic cough.   Rather than testing, many doctors will try treating you for one of the common causes of chronic cough. Only if the treatments aren't successful will they begin testing for more unusual causes.   Imaging tests  X-rays.  Although a routine chest X-ray won't reveal the most common reasons for a cough -- postnasal drip, acid reflux or asthma -- it may be used to check for lung cancer and pneumonia and other lung diseases. An X-ray of your sinuses may reveal evidence of a sinus infection.   Computerized tomography (CT) scans. CT scans also may be used to check your sinus cavities for pockets of infection.  Lung function tests  These simple, noninvasive tests measure how much air your lungs can hold and how fast you can exhale. This test is required to diagnose asthma. Sometimes you may also have an asthma challenge test, which checks how well you can breathe before and after inhaling a drug called methacholine (Provocholine).   Lab tests  If the mucus that you cough up is discolored, your doctor may want to test a sample of it for bacteria.   Scope tests  If your doctor isn't able to find an explanation for your cough, special scope tests may be considered to look for rare causes. These tests use a thin, flexible tube equipped with a light and camera to visualize structures within your body. This equipment can be inserted into your windpipe (trachea) and bronchi to look for abnormalities, as well as biopsy the inside lining of your airway (mucosa) to look for any cellular abnormalities.   Children  A chest X-ray and a spirometry test, at a minimum, are recommended to evaluate the cause of a chronic cough in a child.   Determining the cause of chronic cough is crucial to effective treatment. In many cases, more than one underlying condition may be causing your chronic cough.   If you're taking an angiotensin-converting enzyme (ACE) inhibitor medication, switch to an angiotensin-receptor blocker, which doesn't have a cough as a side effect.   Medications used to treat chronic cough may include:   Antihistamines and decongestants. These drugs are standard treatment for allergies and postnasal drip.   Inhaled asthma drugs. The most  effective treatments for asthma-related cough are inhaled medications that reduce inflammation and widen your airways.   Antibiotics. If a bacterial infection is causing your chronic cough, antibiotics will be prescribed.   Acid blockers. When lifestyle changes don't take care of acid reflux, you may be treated with medications that block acid production. Some people need surgery to resolve the problem.   Cough suppressants. If the reason for your cough can't be determined, your doctor may prescribe a cough suppressant, especially if your cough is interfering with your sleep. However, there's no evidence showing they are effective. They should not be used in children because they may be harmful. A teaspoon (5 grams) of honey in warm water is a good home remedy to loosen up sputum (though it shouldn't be used in children younger than age 1).  A cautious approach on medications is recommended for children, as medications are generally not effective in relieving a nonspecific cough in children.   In many cases, there are measures you can take at home to help ease your chronic cough. Examples include:   Avoid allergens. If your chronic cough can be traced back to nasal allergies, try to avoid the substances that cause your symptoms.   Quit smoking. The most common cause of chronic bronchitis, smoking irritates your lungs and can worsen coughs caused by other factors.   Reduce acid reflux. A cough caused by acid reflux can often be treated with lifestyle changes alone. This includes eating smaller, more frequent meals; waiting three to four hours after a meal before lying down; and elevating the head of the bed at night.  References              Follow-ups after your visit        Follow-up notes from your care team     Return in about 6 months (around 5/30/2018).      Future tests that were ordered for you today     Open Future Orders        Priority Expected Expires Ordered    Fecal colorectal cancer screen (FIT)  "Routine 12/21/2017 2/22/2018 11/30/2017            Who to contact     If you have questions or need follow up information about today's clinic visit or your schedule please contact [facility] directly at [phone].  Normal or non-critical lab and imaging results will be communicated to you by [employer], letter or phone within 4 business days after the clinic has received the results. If you do not hear from us within 7 days, please contact the clinic through [employer] or phone. If you have a critical or abnormal lab result, we will notify you by phone as soon as possible.  Submit refill requests through [employer] or call your pharmacy and they will forward the refill request to us. Please allow 3 business days for your refill to be completed.          Additional Information About Your Visit        [employer] Information     [employer] gives you secure access to your electronic health record. If you see a primary care provider, you can also send messages to your care team and make appointments. If you have questions, please call your primary care clinic.  If you do not have a primary care provider, please call [phone] and they will assist you.        Care EveryWhere ID     This is your Care EveryWhere ID. This could be used by other organizations to access your [place] medical records  [id number]        Your Vitals Were     Pulse Temperature Height Pulse Oximetry BMI (Body Mass Index)       52 97.6  F (36.4  C) (Oral) 5' 3.5\" (1.613 m) 98% 23.19 kg/m2        Blood Pressure from Last 3 Encounters:   11/30/17 137/76   06/08/17 114/66   02/10/17 140/78    Weight from Last 3 Encounters:   11/30/17 133 lb (60.3 kg)   06/08/17 132 lb (59.9 kg)   02/10/17 137 lb 6.4 oz (62.3 kg)                 Today's Medication Changes          These changes are accurate as of: 11/30/17  1:44 PM.  If you have any questions, ask your nurse or doctor.               Start taking these medicines.        Dose/Directions    " omeprazole 40 MG capsule   Commonly known as:  priLOSEC   Used for:  Gastroesophageal reflux disease without esophagitis   Started by:  Hai Mckee MD        Dose:  40 mg   Take 1 capsule (40 mg) by mouth daily Take 30-60 minutes before a meal.   Quantity:  31 capsule   Refills:  1         These medicines have changed or have updated prescriptions.        Dose/Directions    fluticasone 50 MCG/ACT spray   Commonly known as:  FLONASE   This may have changed:  additional instructions   Used for:  Cough        Dose:  1-2 spray   Spray 1-2 sprays into both nostrils daily   Quantity:  1 Package   Refills:  11            Where to get your medicines      These medications were sent to [facility] - [facility] [address]  [address] [facility] [address]     Phone:  [phone]     amLODIPine 10 MG tablet    carvedilol 6.25 MG tablet    chlorthalidone 25 MG tablet    clopidogrel 75 MG tablet    eplerenone 25 MG tablet    losartan 100 MG tablet    omeprazole 40 MG capsule    ranitidine 150 MG tablet    rosuvastatin 20 MG tablet                Primary Care Provider Office Phone # Fax #    Hai Mckee [phone] [address] [address]        Equal Access to Services     [facility] AH: Hadii desiree ruano hadrhiannao Sobarbara, waaxda luqadaha, qaybta kaalmada adeegyada, lluvia galvin. So [facility] [phone].    ATENCIÓN: Si habla español, tiene a hamm disposición servicios gratuitos de asistencia lingüística. [facility] [phone].    We comply with applicable federal civil rights laws and [place] laws. We do not discriminate on the basis of race, color, national origin, age, disability, sex, sexual orientation, or gender identity.            Thank you!     Thank you for choosing [facility]  for your care. Our goal is always to provide you with excellent care. Hearing back from our patients is one way we can  continue to improve our services. Please take a few minutes to complete the written survey that you may receive in the mail after your visit with us. Thank you!             Your Updated Medication List - Protect others around you: Learn how to safely use, store and throw away your medicines at www.disposemymeds.org.          This list is accurate as of: 11/30/17  1:44 PM.  Always use your most recent med list.                   Brand Name Dispense Instructions for use Diagnosis    amLODIPine 10 MG tablet    NORVASC    90 tablet    Take 1 tablet (10 mg) by mouth daily    Benign essential hypertension       aspirin 81 MG tablet     100    ONE DAILY    Type II or unspecified type diabetes mellitus without mention of complication, not stated as uncontrolled       blood glucose monitoring meter device kit     1 kit    Use to test blood sugar once daily or as directed.    Type 2 diabetes, HbA1C goal < 8% (H)       blood glucose monitoring test strip    ONETOUCH ULTRA    100 strip    Use to test blood sugars once daily as directed.    Type 2 diabetes, HbA1C goal < 8% (H)       carvedilol 6.25 MG tablet    COREG    180 tablet    Take 1 tablet (6.25 mg) by mouth 2 times daily (with meals)    Benign essential hypertension       chlorthalidone 25 MG tablet    HYGROTON    45 tablet    Take 0.5 tablets (12.5 mg) by mouth twice a week    Microalbuminuria       clopidogrel 75 MG tablet    PLAVIX    90 tablet    Take 1 tablet (75 mg) by mouth daily    History of stroke, S/P CABG (coronary artery bypass graft)       eplerenone 25 MG tablet    INSPRA    90 tablet    Take 1 tablet (25 mg) by mouth daily    Benign essential hypertension       fluticasone 50 MCG/ACT spray    FLONASE    1 Package    Spray 1-2 sprays into both nostrils daily    Cough       FOLIC ACID PO      Take 800 mcg by mouth every other day        glimepiride 1 MG tablet    AMARYL     Take 0.5 mg by mouth every morning (before breakfast)        losartan 100 MG tablet     COZAAR    90 tablet    Take 1 tablet (100 mg) by mouth daily    Benign essential hypertension       metFORMIN 500 MG tablet    GLUCOPHAGE     Take 1,000 mg by mouth 2 times daily (with meals)        nitroGLYcerin 0.4 MG sublingual tablet    NITROSTAT    25 tablet    Place 1 tablet (0.4 mg) under the tongue See Admin Instructions for chest pain    Chronic ischemic heart disease       omeprazole 40 MG capsule    priLOSEC    31 capsule    Take 1 capsule (40 mg) by mouth daily Take 30-60 minutes before a meal.    Gastroesophageal reflux disease without esophagitis       ONETOUCH DELICA LANCETS 33G Misc     100 each    1 Device daily    Type 2 diabetes, HbA1C goal < 8% (H)       OVER-THE-COUNTER      Beplex Forte 260  mcg every OTHER day        ranitidine 150 MG tablet    ZANTAC    180 tablet    Take 1 tablet (150 mg) by mouth 2 times daily    Gastroesophageal reflux disease without esophagitis       rosuvastatin 20 MG tablet    CRESTOR    90 tablet    Take 1 tablet (20 mg) by mouth daily    Hyperlipidemia LDL goal <130       STUDY MEDICATION      Metformin 1000 mg bid Glimepiride 1 mg daily  Medication provided by GRADE study only.  Coordinator: Raymundo Coy -[id number] PI: Shanon Guy -[id number]        vitamin D 2000 UNITS Caps      Take 1 capsule by mouth every other day

## 2018-02-08 ENCOUNTER — DOCUMENTATION ONLY (OUTPATIENT)
Dept: ENDOCRINOLOGY | Facility: CLINIC | Age: 68
End: 2018-02-08

## 2018-02-08 NOTE — PROGRESS NOTES
GRADE study visit     Patient is here for 36 month GRADE study visit. Patient is doing well and continues on Metformin 1000 mg bid in addition to randomized treatment of glimepiride.  He is currently taking 0.5 mg daily (reduced from 1mg at last visit due to hypoglycemia). He will be traveling to [facility] for 3 weeks- [place] and [facility] to visit his family.  He plans to wear a mask in [place] because of the pollution.   He otherwise has been feeling well and in his usual state of health.      Lab results:   A1c: 6.6%  LDL: 51  Creatinine: 0.92  GFR: 86  Albumin:creatinine Ratio: 321.95     Plan:   1. Diabetes- A1c at target. No changes.   2. Vitamin B12 deficiency- currently on 1000 daily.  Level back in normal range.   3.  Albuminuria- his microalbuminuria has progressed to albuminuria.  He is on 5 antihypertensives.  Will f/u with Dr. Mckee for further evaluation.   4. Follow up in 3 months, sooner with concerns.      Lulu Cortés PA-C, MPAS   [facility]  Department of Medicine  Division of Endocrinology and Diabetes

## 2018-02-09 LAB
CREAT SERPL-MCNC: 0.92 MG/DL
HBA1C MFR BLD: 6.6 % (ref 0–5.7)
LDL CHOLESTEROL: 51 MG/DL
MICROALBUMIN - QUEST: 321.95
VIT B12 SERPL-MCNC: 776 PG/ML

## 2018-04-03 ENCOUNTER — OFFICE VISIT (OUTPATIENT)
Dept: CARDIOLOGY | Facility: CLINIC | Age: 68
End: 2018-04-03
Attending: NURSE PRACTITIONER
Payer: COMMERCIAL

## 2018-04-03 VITALS
DIASTOLIC BLOOD PRESSURE: 92 MMHG | HEART RATE: 54 BPM | WEIGHT: 129 LBS | BODY MASS INDEX: 22.02 KG/M2 | SYSTOLIC BLOOD PRESSURE: 165 MMHG | HEIGHT: 64 IN

## 2018-04-03 DIAGNOSIS — I25.10 CORONARY ARTERY DISEASE INVOLVING NATIVE CORONARY ARTERY OF NATIVE HEART WITHOUT ANGINA PECTORIS: ICD-10-CM

## 2018-04-03 DIAGNOSIS — I10 BENIGN ESSENTIAL HYPERTENSION: ICD-10-CM

## 2018-04-03 PROCEDURE — 99214 OFFICE O/P EST MOD 30 MIN: CPT | Performed by: INTERNAL MEDICINE
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
## 2018-04-03 NOTE — MR AVS SNAPSHOT
After Visit Summary   4/3/2018    Mati Pulliam    MRN: [id number]           Patient Information     Date Of Birth          1950        Visit Information        Provider Department      4/3/2018 3:15 PM Mars Vasquez MD [facility]        Today's Diagnoses     Benign essential hypertension        Coronary artery disease involving native coronary artery of native heart without angina pectoris           Follow-ups after your visit        Additional Services     Follow-Up with Cardiologist                 Your next 10 appointments already scheduled     Jun 14, 2018 11:00 AM CDT   [facility] Visit with Hai Mckee MD   [facility]    [address] [facility] [address] [phone]           Bring a current list of meds and any records pertaining to this visit. For Physicals, please bring immunization records and any forms needing to be filled out. Please arrive 10 minutes early to complete paperwork.              Future tests that were ordered for you today     Open Future Orders        Priority Expected Expires Ordered    Follow-Up with Cardiologist Routine 4/2/2020 4/22/2020 4/3/2018            Who to contact     If you have questions or need follow up information about today's clinic visit or your schedule please contact [facility] directly at [phone].  Normal or non-critical lab and imaging results will be communicated to you by MyChart, letter or phone within 4 business days after the clinic has received the results. If you do not hear from us within 7 days, please contact the clinic through MyChart or phone. If you have a critical or abnormal lab result, we will notify you by phone as soon as possible.  Submit refill requests through [employer] or call your pharmacy and they will forward the refill request to us. Please allow 3 business days for your refill to be  "completed.          Additional Information About Your Visit        [employer] Information     [employer] gives you secure access to your electronic health record. If you see a primary care provider, you can also send messages to your care team and make appointments. If you have questions, please call your primary care clinic.  If you do not have a primary care provider, please call [phone] and they will assist you.        Care EveryWhere ID     This is your Care EveryWhere ID. This could be used by other organizations to access your [facility] medical records  [id number]        Your Vitals Were     Pulse Height BMI (Body Mass Index)             54 1.613 m (5' 3.5\") 22.49 kg/m2          Blood Pressure from Last 3 Encounters:   04/03/18 (!) 165/92   11/30/17 137/76   06/08/17 114/66    Weight from Last 3 Encounters:   04/03/18 58.5 kg (129 lb)   11/30/17 60.3 kg (133 lb)   06/08/17 59.9 kg (132 lb)              We Performed the Following     Follow-Up with Cardiologist          Today's Medication Changes          These changes are accurate as of 4/3/18  3:44 PM.  If you have any questions, ask your nurse or doctor.               These medicines have changed or have updated prescriptions.        Dose/Directions    fluticasone 50 MCG/ACT spray   Commonly known as:  FLONASE   This may have changed:  additional instructions   Used for:  Cough        Dose:  1-2 spray   Spray 1-2 sprays into both nostrils daily   Quantity:  1 Package   Refills:  11                Primary Care Provider Office Phone # Fax #    Bhavjot MD Rylee [phone] [address]  [facility] [address]        Equal Access to Services     [facility] Center[facility] AH: Hadkavita Pollard, trudy murillo, lluvia holt. So [place] -[phone].    ATENCIÓN: Si habla español, tiene a hamm disposición servicios gratuitos de asistencia lingüística. Llame al [phone].    We comply with " applicable federal civil rights laws and [place] laws. We do not discriminate on the basis of race, color, national origin, age, disability, sex, sexual orientation, or gender identity.            Thank you!     Thank you for choosing [facility]  for your care. Our goal is always to provide you with excellent care. Hearing back from our patients is one way we can continue to improve our services. Please take a few minutes to complete the written survey that you may receive in the mail after your visit with us. Thank you!             Your Updated Medication List - Protect others around you: Learn how to safely use, store and throw away your medicines at www.disposemymeds.org.          This list is accurate as of 4/3/18  3:44 PM.  Always use your most recent med list.                   Brand Name Dispense Instructions for use Diagnosis    amLODIPine 10 MG tablet    NORVASC    90 tablet    Take 1 tablet (10 mg) by mouth daily    Benign essential hypertension       aspirin 81 MG tablet     100    ONE DAILY    Type II or unspecified type diabetes mellitus without mention of complication, not stated as uncontrolled       blood glucose monitoring meter device kit     1 kit    Use to test blood sugar once daily or as directed.    Type 2 diabetes, HbA1C goal < 8% (H)       blood glucose monitoring test strip    ONETOUCH ULTRA    100 strip    Use to test blood sugars once daily as directed.    Type 2 diabetes, HbA1C goal < 8% (H)       carvedilol 6.25 MG tablet    COREG    180 tablet    Take 1 tablet (6.25 mg) by mouth 2 times daily (with meals)    Benign essential hypertension       chlorthalidone 25 MG tablet    HYGROTON    45 tablet    Take 0.5 tablets (12.5 mg) by mouth twice a week    Microalbuminuria       clopidogrel 75 MG tablet    PLAVIX    90 tablet    Take 1 tablet (75 mg) by mouth daily    History of stroke, S/P CABG (coronary artery bypass graft)       eplerenone 25 MG  tablet    INSPRA    90 tablet    Take 1 tablet (25 mg) by mouth daily    Benign essential hypertension       fluticasone 50 MCG/ACT spray    FLONASE    1 Package    Spray 1-2 sprays into both nostrils daily    Cough       glimepiride 1 MG tablet    AMARYL     Take 0.5 mg by mouth every morning (before breakfast)        losartan 100 MG tablet    COZAAR    90 tablet    Take 1 tablet (100 mg) by mouth daily    Benign essential hypertension       metFORMIN 500 MG tablet    GLUCOPHAGE     Take 1,000 mg by mouth 2 times daily (with meals)        nitroGLYcerin 0.4 MG sublingual tablet    NITROSTAT    25 tablet    Place 1 tablet (0.4 mg) under the tongue See Admin Instructions for chest pain    Chronic ischemic heart disease       omeprazole 40 MG capsule    priLOSEC    31 capsule    Take 1 capsule (40 mg) by mouth daily Take 30-60 minutes before a meal.    Gastroesophageal reflux disease without esophagitis       ONETOUCH DELICA LANCETS 33G Misc     100 each    1 Device daily    Type 2 diabetes, HbA1C goal < 8% (H)       OVER-THE-COUNTER      Beplex Forte 260  mcg every OTHER day        rosuvastatin 20 MG tablet    CRESTOR    90 tablet    Take 1 tablet (20 mg) by mouth daily    Hyperlipidemia LDL goal <70       STUDY MEDICATION      Metformin 1000 mg bid Glimepiride 1 mg daily  Medication provided by GRADE study only.  Coordinator: Raymundo Coy -[phone] PI: Shanon Guy -[phone]        vitamin D 2000 UNITS Caps      Take 1 capsule by mouth every other day

## 2018-04-03 NOTE — LETTER
4/3/2018      Hai Mckee MD  [address]  [facility] [address]      RE: Mati Pulliam       Dear Colleague,    I had the pleasure of seeing Mati Pulliam in the [facility].    Service Date: 04/03/2018      HISTORY OF PRESENT ILLNESS:  I had the pleasure of following up on our mutual patient, Mati Pulliam.  He is a delightful 67-year-old gentleman.  He has extensive coronary disease and underwent bypass surgery in 2009 with an LIMA graft to the LAD, sequential vein graft to diagonal and OM2, and a separate vein graft to the right coronary posterolateral branch.  The patient had a nuclear stress test a little bit over a year ago that showed normal ejection fraction and no ischemia.  Last echocardiogram was in 2014, which also showed normal LV size and function, normal right heart, no significant valvular disease.  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  He has made a good recovery, although I still sense some word-finding problems.  He had lipids drawn recently.  They are at goal.  He is diabetic with good hemoglobin A1cs well under 7%, but from the [facility] they did note approximately 300 mg of albumin in the urine, and they suggested he see a nephrologist.  I did give him the name of [facility].  The only thing that is outstanding today is our blood pressure was repeatedly checked by me and it is about 160 systolic.  Interestingly, none of his other numbers were that high, and he states he was just at the diabetologist clinic at the [facility] a couple of months ago or less and the blood pressure was normal there.  I have asked the patient to go home and check his blood pressure a few more times.  If he is not consistently in the mid 130 range or less, I have asked him to call Dr. Mckee or call us.  I also gave him, as I mentioned, [facility], and he may want to follow up with them both for the proteinuria and blood  pressure control.  The patient did have some gynecomastia on Aldactone, and I switched to Inspra and the gynecomastia is gone.  He will need electrolytes once a year since he is on Cozaar 100 mg plus Inspra.  He is not on ACE inhibitors due to previous intolerance.  He is on low-dose chlorthalidone.  If the blood pressure remains high, I would probably increase that to daily or use a similar medicine such as Lozol.  He is on maximum-dose amlodipine at 10 mg per day because that drug versus diltiazem affect the renal artery afferent/efferent differently and change could be made; however, he is on a very low dose of Coreg and he already has a heart rate in the 50s, and therefore I would not recommend diltiazem at this point, should the nephrologist want to change his medications.  He is on long-term Plavix with a history of stroke, although oftentimes with a history of stroke we do not combine both aspirin and Plavix together, and I will ask Dr. Mckee to weigh in on that.  At this juncture, then, I will see him back in 2 years.  I do not anticipate a stress test for at least 2 years.  I will ask if Dr. Mckee can kindly check his electrolytes and lipids at least once a year.  Thank you for allowing me see Mr. Poole.  This was a 30-minute visit, greater than 50% counseling.      Mars Dunham MD       cc:   Hai Mckee MD    [address], [address]    [place] [address]       TAMARA Wayne   [address], [facility] [address]   [place] [address]         MARS DUNHAM MD             D: 2018   T: 2018   MT: JUAN C      Name:     CORINE BILLY   MRN:      [id number]        Account:      [id number]   :      1950           Service Date: 2018      Document: [id number]         Outpatient Encounter Prescriptions as of 4/3/2018   Medication Sig Dispense Refill     eplerenone (INSPRA) 25 MG tablet Take 1 tablet (25 mg) by mouth daily 90  tablet 3     chlorthalidone (HYGROTON) 25 MG tablet Take 0.5 tablets (12.5 mg) by mouth twice a week 45 tablet 11     losartan (COZAAR) 100 MG tablet Take 1 tablet (100 mg) by mouth daily 90 tablet 2     clopidogrel (PLAVIX) 75 MG tablet Take 1 tablet (75 mg) by mouth daily 90 tablet 2     carvedilol (COREG) 6.25 MG tablet Take 1 tablet (6.25 mg) by mouth 2 times daily (with meals) 180 tablet 2     rosuvastatin (CRESTOR) 20 MG tablet Take 1 tablet (20 mg) by mouth daily 90 tablet 2     amLODIPine (NORVASC) 10 MG tablet Take 1 tablet (10 mg) by mouth daily 90 tablet 2     omeprazole (PRILOSEC) 40 MG capsule Take 1 capsule (40 mg) by mouth daily Take 30-60 minutes before a meal. 31 capsule 1     nitroglycerin (NITROSTAT) 0.4 MG sublingual tablet Place 1 tablet (0.4 mg) under the tongue See Admin Instructions for chest pain 25 tablet 3     glimepiride (AMARYL) 1 MG tablet Take 0.5 mg by mouth every morning (before breakfast)        metFORMIN (GLUCOPHAGE) 500 MG tablet Take 1,000 mg by mouth 2 times daily (with meals)       fluticasone (FLONASE) 50 MCG/ACT nasal spray Spray 1-2 sprays into both nostrils daily (Patient taking differently: Spray 1-2 sprays into both nostrils daily PRN) 1 Package 11     Cholecalciferol (VITAMIN D) 2000 UNITS CAPS Take 1 capsule by mouth every other day       ASPIRIN 81 MG OR TABS ONE DAILY 100 3     [DISCONTINUED] ranitidine (ZANTAC) 150 MG tablet Take 1 tablet (150 mg) by mouth 2 times daily 180 tablet 2     STUDY MEDICATION Metformin 1000 mg bid  Glimepiride 1 mg daily    Medication provided by GRADE study only.   Coordinator: Raymundo Coy -[id number]  PI: Shanon Guy -[id number]       [DISCONTINUED] FOLIC ACID PO Take 800 mcg by mouth every other day       blood glucose monitoring (ONE TOUCH ULTRA MINI) meter device kit Use to test blood sugar once daily or as directed. 1 kit      ONETOUCH DELICA LANCETS 33G MISC 1 Device daily 100 each prn     blood glucose (ONE TOUCH ULTRA) test  strip Use to test blood sugars once daily as directed. 100 strip PRN     OVER-THE-COUNTER Beplex Forte 260  mcg every OTHER day       No facility-administered encounter medications on file as of 4/3/2018.      Again, thank you for allowing me to participate in the care of your patient.      Sincerely,    Mars Vasquez MD     [facility]

## 2018-04-04 NOTE — PROGRESS NOTES
Service Date: 04/03/2018      HISTORY OF PRESENT ILLNESS:  I had the pleasure of following up on our mutual patient, Mati Pulliam.  He is a delightful 67-year-old gentleman.  He has extensive coronary disease and underwent bypass surgery in 2009 with an LIMA graft to the LAD, sequential vein graft to diagonal and OM2, and a separate vein graft to the right coronary posterolateral branch.  The patient had a nuclear stress test a little bit over a year ago that showed normal ejection fraction and no ischemia.  Last echocardiogram was in 2014, which also showed normal LV size and function, normal right heart, no significant valvular disease.  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  He has made a good recovery, although I still sense some word-finding problems.  He had lipids drawn recently.  They are at goal.  He is diabetic with good hemoglobin A1cs well under 7%, but from the [facility] they did note approximately 300 mg of albumin in the urine, and they suggested he see a nephrologist.  I did give him the name of [facility].  The only thing that is outstanding today is our blood pressure was repeatedly checked by me and it is about 160 systolic.  Interestingly, none of his other numbers were that high, and he states he was just at the diabetologist clinic at the [facility] a couple of months ago or less and the blood pressure was normal there.  I have asked the patient to go home and check his blood pressure a few more times.  If he is not consistently in the mid 130 range or less, I have asked him to call Dr. Mckee or call us.  I also gave him, as I mentioned, [facility], and he may want to follow up with them both for the proteinuria and blood pressure control.  The patient did have some gynecomastia on Aldactone, and I switched to Inspra and the gynecomastia is gone.  He will need electrolytes once a year since he is on Cozaar 100 mg plus Inspra.  He is not on ACE  inhibitors due to previous intolerance.  He is on low-dose chlorthalidone.  If the blood pressure remains high, I would probably increase that to daily or use a similar medicine such as Lozol.  He is on maximum-dose amlodipine at 10 mg per day because that drug versus diltiazem affect the renal artery afferent/efferent differently and change could be made; however, he is on a very low dose of Coreg and he already has a heart rate in the 50s, and therefore I would not recommend diltiazem at this point, should the nephrologist want to change his medications.  He is on long-term Plavix with a history of stroke, although oftentimes with a history of stroke we do not combine both aspirin and Plavix together, and I will ask Dr. Mckee to weigh in on that.  At this juncture, then, I will see him back in 2 years.  I do not anticipate a stress test for at least 2 years.  I will ask if Dr. Mckee can kindly check his electrolytes and lipids at least once a year.  Thank you for allowing me see Mr. Poole.  This was a 30-minute visit, greater than 50% counseling.      Mars Dunham MD       cc:   Hai Mckee MD    [address], [address] [place] [address]       TAMARA Wayne   [address], [facility] [address]   [place] [id number]         MARS DUNHAM MD             D: 2018   T: 2018   MT: JUAN C      Name:     CORINE BILLY   MRN:      [id number]        Account:      [id number]   :      1950           Service Date: 2018      Document: [id number]

## 2018-05-17 ENCOUNTER — DOCUMENTATION ONLY (OUTPATIENT)
Dept: ENDOCRINOLOGY | Facility: CLINIC | Age: 68
End: 2018-05-17

## 2018-05-17 LAB — HBA1C MFR BLD: 6.3 % (ref 0–5.7)

## 2018-05-17 NOTE — PROGRESS NOTES
GRADE study visit     Patient is here for 39 month GRADE study visit. Patient is doing well and continues on Metformin 1000 mg bid in addition to randomized treatment of glimepiride.  He is currently taking 0.5 mg daily (reduced from 1mg at last visit due to hypoglycemia). He just returned from [place].  Had a lot of swelling in his legs while traveling (no sob or chest discomfort), but this has now resolved.  BP remains high both here and at home, despite 5 antihypertensives including spironolactone.  He otherwise has been feeling well and in his usual state of health.      BP: 160/71, 157/72     Lab results:   A1c:6.3%.      Plan:   1. Diabetes- A1c at target. No hypoglycemia. No changes.   2. Vitamin B12 deficiency- currently on 1000 daily.  Level back in normal range.   3.  Albuminuria- his microalbuminuria has progressed to albuminuria.  He is on 5 antihypertensives.  Will f/u with Dr. Mckee for further evaluation and possibly a nephrology referral.   4. Follow up in 3 months, sooner with concerns.      Lulu Cortés PA-C, MPAS   [facility]  Department of Medicine  Division of Endocrinology and Diabetes

## 2018-06-14 ENCOUNTER — OFFICE VISIT (OUTPATIENT)
Dept: FAMILY MEDICINE | Facility: CLINIC | Age: 68
End: 2018-06-14
Payer: COMMERCIAL

## 2018-06-14 VITALS
SYSTOLIC BLOOD PRESSURE: 159 MMHG | BODY MASS INDEX: 22.02 KG/M2 | HEART RATE: 43 BPM | HEIGHT: 64 IN | WEIGHT: 129 LBS | DIASTOLIC BLOOD PRESSURE: 70 MMHG | OXYGEN SATURATION: 98 % | TEMPERATURE: 97 F

## 2018-06-14 DIAGNOSIS — E78.5 HYPERLIPIDEMIA LDL GOAL <70: ICD-10-CM

## 2018-06-14 DIAGNOSIS — Z12.11 SCREEN FOR COLON CANCER: ICD-10-CM

## 2018-06-14 DIAGNOSIS — R80.9 MICROALBUMINURIA: ICD-10-CM

## 2018-06-14 DIAGNOSIS — L84 CALLUS OF FOOT: ICD-10-CM

## 2018-06-14 DIAGNOSIS — I10 ESSENTIAL HYPERTENSION: ICD-10-CM

## 2018-06-14 DIAGNOSIS — E11.59 TYPE 2 DIABETES MELLITUS WITH VASCULAR DISEASE (H): Primary | ICD-10-CM

## 2018-06-14 DIAGNOSIS — Z23 NEED FOR VACCINATION: ICD-10-CM

## 2018-06-14 DIAGNOSIS — Z95.1 S/P CABG (CORONARY ARTERY BYPASS GRAFT): ICD-10-CM

## 2018-06-14 PROCEDURE — 90732 PPSV23 VACC 2 YRS+ SUBQ/IM: CPT | Performed by: INTERNAL MEDICINE

## 2018-06-14 PROCEDURE — 99214 OFFICE O/P EST MOD 30 MIN: CPT | Mod: 25 | Performed by: INTERNAL MEDICINE

## 2018-06-14 PROCEDURE — G0009 ADMIN PNEUMOCOCCAL VACCINE: HCPCS | Performed by: INTERNAL MEDICINE

## 2018-06-14 RX ORDER — CHLORTHALIDONE 25 MG/1
25 TABLET ORAL
Qty: 90 TABLET | Refills: 11 | Status: SHIPPED | OUTPATIENT
Start: 2018-06-14 | End: 2018-06-14

## 2018-06-14 RX ORDER — CHLORTHALIDONE 25 MG/1
25 TABLET ORAL DAILY
Qty: 90 TABLET | Refills: 11 | Status: SHIPPED | OUTPATIENT
Start: 2018-06-14 | End: 2018-07-05

## 2018-06-14 NOTE — MR AVS SNAPSHOT
After Visit Summary   6/14/2018    Mati Pulliam    MRN: [id number]           Patient Information     Date Of Birth          1950        Visit Information        Provider Department      6/14/2018 11:00 AM Hai Mckee MD [facility]        Today's Diagnoses     Type 2 diabetes mellitus with vascular disease (H)    -  1    Essential hypertension        S/P CABG (coronary artery bypass graft)        Hyperlipidemia LDL goal <70        Microalbuminuria        Screen for colon cancer          Care Instructions    What is microalbuminuria?  Albumin is a protein which is present in the blood. The kidneys act as a filter for waste products in the blood. Protein is not allowed to spill over into the urine unless the filter system is  leaky .   Microalbuminuria refers to the appearance of small but abnormal amounts of albumin in the urine. If measured, this protein excretion is between 30 and 300 mg during a 24 hour period.  How is it detected?  This requires special methods of testing as tiny amounts of albumin are not detectable by standard urine tests which are traditionally used to check for protein.   A random sample of urine is often used to measure the albumin-to-creatinine ration (ACR.   At least two or three measurements should be made before the diagnosis of persistant microalbuminuria is made.   A 24 hour urine collection of the estimation of the  albumin excretion rate  (AER) is the best method of assessing albumin excretion, though this is time consuming and expensive and therefore not suitable for large scale screening.  What is a  normal  ACR?  The Albumin-Creatinine Ratio varies according to age and sex. As a general rule men should have an ACR less than 2.5, whereas women should measure less than 3.5.  What is the significance of microalbuminuria?  Microalbuminuria often heralds the onset of diabetic nephropathy. It is also an independent risk factor for cardiovascular  disease. It is important to identify the risk of both these conditions so you have the best chance of preventing them.  Who is likely to develop microalbuminuria?  Microalbuminuria is almost unheard of in childhood, before adolescence. Studies have shown that development of microalbuminuria is closely linked to long term blood sugar control. This risk is increased by:  1. duration of diabetes  2. high blood pressure  3. genetic susceptibility  What can be done to prevent the onset of microalbuminuria?  Good long term blood sugar and blood pressure control. Don t smoke. Regular exercise.  Who should be tested?  People with type 1 diabetes for over five years.   People with type 2 diabetes at time of diagnosis and on an annual basis therafter.  Is it possible to have a positive microalbuminuria test without having diabetic nephropathy?  Yes. False positive tests may occur after strenuous exercise and in the presence of other kidney disorders, such as glomerulonephritis or infections.  Why screen for microalbuminuria?  evidence that treatment with an ACE inhibitor or A-II anatagonist is effective in slowing progress to nephropathy.   microalbuminuria is the strongest independent risk factor of cardiovascular disease.   screening identifies an increased risk of proliferative retinopathy.  What does the presence of microalbuminuria indicate in someone with type 2 diabetes?  Microalbuminuria rates are very high in type 2 diabetes. Many people with T2D have microalbuminuria at the time of diagnosis. If you have T2D, you should have a test for microalbuminuria at the time of diagnosis and thereafter on an annual basis. In T2D, having microalbuminuria is a powerful message that you have an increased risk of heart disease.  I have a positive test - what now?  For reasons outlined above the following measures are advisable:  tighten up your blood sugar control   blood pressure lowering if high   Less than 130/80   exercise    lipid lowering treatment  low dose aspirin   ACE inhibitors or AII antagonists- you are on Cozaar and Aldactone both        TAKE HYGROTON DAILY 25 mg   Repeat blood pressure again and urine microalbumin and BMP 2 weeks          Follow-ups after your visit        Additional Services     NEPHROLOGY ADULT REFERRAL       Your provider has referred you to: [facility]IV[facility]: Dianne Early [phone]   [email or web address]    Please be aware that coverage of these services is subject to the terms and limitations of your health insurance plan.  Call member services at your health plan with any benefit or coverage questions.      Reason for referral:  microalbuminuria  Please bring the following to your appointment:    >>   Any x-rays, CTs or MRIs which have been performed.  Contact the facility where they were done to arrange for  prior to your scheduled appointment.   >>   List of current medications   >>   This referral request   >>   Any documents/labs given to you for this referral                  Follow-up notes from your care team     Return in about 3 months (around 9/14/2018).      Future tests that were ordered for you today     Open Future Orders        Priority Expected Expires Ordered    **Basic metabolic panel FUTURE 14d Routine 6/21/2018 6/28/2018 6/14/2018    Albumin Random Urine Quantitative with Creat Ratio Routine 6/14/2018 6/14/2019 6/14/2018    US Renal Complete Routine  6/14/2019 6/14/2018    Fecal colorectal cancer screen (FIT) Routine 7/3/2018 9/4/2018 6/14/2018            Who to contact     If you have questions or need follow up information about today's clinic visit or your schedule please contact [facility] directly at [phone].  Normal or non-critical lab and imaging results will be communicated to you by MyChart, letter or phone within 4 business days after the clinic has received the results. If you do not hear from us within 7 days, please contact  "the clinic through [employer] or phone. If you have a critical or abnormal lab result, we will notify you by phone as soon as possible.  Submit refill requests through [employer] or call your pharmacy and they will forward the refill request to us. Please allow 3 business days for your refill to be completed.          Additional Information About Your Visit        [employer] Information     [employer] gives you secure access to your electronic health record. If you see a primary care provider, you can also send messages to your care team and make appointments. If you have questions, please call your primary care clinic.  If you do not have a primary care provider, please call [phone] and they will assist you.        Care EveryWhere ID     This is your Care EveryWhere ID. This could be used by other organizations to access your [place] medical records  [id number]        Your Vitals Were     Pulse Temperature Height Pulse Oximetry BMI (Body Mass Index)       43 97  F (36.1  C) (Oral) 5' 3.5\" (1.613 m) 98% 22.49 kg/m2        Blood Pressure from Last 3 Encounters:   06/14/18 159/70   04/03/18 (!) 165/92   11/30/17 137/76    Weight from Last 3 Encounters:   06/14/18 129 lb (58.5 kg)   04/03/18 129 lb (58.5 kg)   11/30/17 133 lb (60.3 kg)              We Performed the Following     NEPHROLOGY ADULT REFERRAL          Today's Medication Changes          These changes are accurate as of 6/14/18 11:24 AM.  If you have any questions, ask your nurse or doctor.               Start taking these medicines.        Dose/Directions    chlorthalidone 25 MG tablet   Commonly known as:  HYGROTON   Used for:  Microalbuminuria   Started by:  Hai Mckee MD        Dose:  25 mg   Take 1 tablet (25 mg) by mouth daily   Quantity:  90 tablet   Refills:  11         These medicines have changed or have updated prescriptions.        Dose/Directions    fluticasone 50 MCG/ACT spray   Commonly known as:  FLONASE   This may have changed:  additional " instructions   Used for:  Cough        Dose:  1-2 spray   Spray 1-2 sprays into both nostrils daily   Quantity:  1 Package   Refills:  11         Stop taking these medicines if you haven't already. Please contact your care team if you have questions.     omeprazole 40 MG capsule   Commonly known as:  priLOSEC   Stopped by:  Hai Mckee MD                Where to get your medicines      These medications were sent to [facility] - [facility] [address] [facility]  [address] [facility], [facility] [address]     Phone:  [phone]     chlorthalidone 25 MG tablet                Primary Care Provider Office Phone # Fax #    Hai Mckee [phone] [address] [address]        Equal Access to Services     [facility]: Hadii desiree ruano hadasho Soomaali, waaxda luqadaha, qaybta kaalmada adeegyada, lluvia guidry . So [facility] [phone].    ATENCIÓN: Si habla español, tiene a hamm disposición servicios gratuitos de asistencia lingüística. [facility] [phone].    We comply with applicable federal civil rights laws and [place] laws. We do not discriminate on the basis of race, color, national origin, age, disability, sex, sexual orientation, or gender identity.            Thank you!     Thank you for choosing [facility]  for your care. Our goal is always to provide you with excellent care. Hearing back from our patients is one way we can continue to improve our services. Please take a few minutes to complete the written survey that you may receive in the mail after your visit with us. Thank you!             Your Updated Medication List - Protect others around you: Learn how to safely use, store and throw away your medicines at www.disposemymeds.org.          This list is accurate as of 6/14/18 11:24 AM.  Always use your most recent med list.                   Brand Name Dispense Instructions for use Diagnosis    amLODIPine  10 MG tablet    NORVASC    90 tablet    Take 1 tablet (10 mg) by mouth daily    Benign essential hypertension       aspirin 81 MG tablet     100    ONE DAILY    Type II or unspecified type diabetes mellitus without mention of complication, not stated as uncontrolled       blood glucose monitoring meter device kit     1 kit    Use to test blood sugar once daily or as directed.    Type 2 diabetes, HbA1C goal < 8% (H)       blood glucose monitoring test strip    ONETOUCH ULTRA    100 strip    Use to test blood sugars once daily as directed.    Type 2 diabetes, HbA1C goal < 8% (H)       carvedilol 6.25 MG tablet    COREG    180 tablet    Take 1 tablet (6.25 mg) by mouth 2 times daily (with meals)    Benign essential hypertension       chlorthalidone 25 MG tablet    HYGROTON    90 tablet    Take 1 tablet (25 mg) by mouth daily    Microalbuminuria       clopidogrel 75 MG tablet    PLAVIX    90 tablet    Take 1 tablet (75 mg) by mouth daily    History of stroke, S/P CABG (coronary artery bypass graft)       eplerenone 25 MG tablet    INSPRA    90 tablet    Take 1 tablet (25 mg) by mouth daily    Benign essential hypertension       fluticasone 50 MCG/ACT spray    FLONASE    1 Package    Spray 1-2 sprays into both nostrils daily    Cough       glimepiride 1 MG tablet    AMARYL     Take 0.5 mg by mouth every morning (before breakfast)        losartan 100 MG tablet    COZAAR    90 tablet    Take 1 tablet (100 mg) by mouth daily    Benign essential hypertension       metFORMIN 500 MG tablet    GLUCOPHAGE     Take 1,000 mg by mouth 2 times daily (with meals)        nitroGLYcerin 0.4 MG sublingual tablet    NITROSTAT    25 tablet    Place 1 tablet (0.4 mg) under the tongue See Admin Instructions for chest pain    Chronic ischemic heart disease       ONETOUCH DELICA LANCETS 33G Misc     100 each    1 Device daily    Type 2 diabetes, HbA1C goal < 8% (H)       OVER-THE-COUNTER      Beplex Forte 260  mcg every OTHER day         rosuvastatin 20 MG tablet    CRESTOR    90 tablet    Take 1 tablet (20 mg) by mouth daily    Hyperlipidemia LDL goal <70       STUDY MEDICATION      Metformin 1000 mg bid Glimepiride 1 mg daily  Medication provided by GRADE study only.  Coordinator: Raymundo Coy -[phone] PI: Shanon Guy -5200        vitamin D 2000 units Caps      Take 1 capsule by mouth every other day

## 2018-06-14 NOTE — NURSING NOTE
Screening Questionnaire for Adult Immunization     Are you sick today?   No    Do you have allergies to medications, food or any vaccine?   yes    Have you ever had a serious reaction after receiving a vaccination?   No    Do you have a long-term health problem with heart disease, lung disease,  asthma, kidney disease, diabetes, anemia, metabolic or blood disease?   Yes    Do you have cancer, leukemia, AIDS, or any immune system problem?   No    Do you take cortisone, prednisone, other steroids, or anticancer drugs, or  have you had any x-ray (radiation) treatments?   No    Have you had a seizure, brain, or other nervous system problem?   No    During the past year, have you received a transfusion of blood or blood       products, or been given a medicine called immune (gamma) globulin?   No    For women: Are you pregnant or is there a chance you could become         pregnant during the next month?   No    Have you received any vaccinations in the past 4 weeks?   No     Immunization questionnaire was positive for at least one answer.  Notified Rosalind CONDE MA  Prior to injection verified patient identity using patient's name and date of birth.  Due to injection administration, patient instructed to remain in clinic for 15 minutes  afterwards, and to report any adverse reaction to me immediately.        MNVFC doesn't apply on this patient     Screening performed by Jennifer Renteria on 6/14/2018 at 12:04 PM.

## 2018-06-14 NOTE — PATIENT INSTRUCTIONS
What is microalbuminuria?  Albumin is a protein which is present in the blood. The kidneys act as a filter for waste products in the blood. Protein is not allowed to spill over into the urine unless the filter system is  leaky .   Microalbuminuria refers to the appearance of small but abnormal amounts of albumin in the urine. If measured, this protein excretion is between 30 and 300 mg during a 24 hour period.  How is it detected?  This requires special methods of testing as tiny amounts of albumin are not detectable by standard urine tests which are traditionally used to check for protein.   A random sample of urine is often used to measure the albumin-to-creatinine ration (ACR.   At least two or three measurements should be made before the diagnosis of persistant microalbuminuria is made.   A 24 hour urine collection of the estimation of the  albumin excretion rate  (AER) is the best method of assessing albumin excretion, though this is time consuming and expensive and therefore not suitable for large scale screening.  What is a  normal  ACR?  The Albumin-Creatinine Ratio varies according to age and sex. As a general rule men should have an ACR less than 2.5, whereas women should measure less than 3.5.  What is the significance of microalbuminuria?  Microalbuminuria often heralds the onset of diabetic nephropathy. It is also an independent risk factor for cardiovascular disease. It is important to identify the risk of both these conditions so you have the best chance of preventing them.  Who is likely to develop microalbuminuria?  Microalbuminuria is almost unheard of in childhood, before adolescence. Studies have shown that development of microalbuminuria is closely linked to long term blood sugar control. This risk is increased by:  1. duration of diabetes  2. high blood pressure  3. genetic susceptibility  What can be done to prevent the onset of microalbuminuria?  Good long term blood sugar and blood pressure  control. Don t smoke. Regular exercise.  Who should be tested?  People with type 1 diabetes for over five years.   People with type 2 diabetes at time of diagnosis and on an annual basis therafter.  Is it possible to have a positive microalbuminuria test without having diabetic nephropathy?  Yes. False positive tests may occur after strenuous exercise and in the presence of other kidney disorders, such as glomerulonephritis or infections.  Why screen for microalbuminuria?  evidence that treatment with an ACE inhibitor or A-II anatagonist is effective in slowing progress to nephropathy.   microalbuminuria is the strongest independent risk factor of cardiovascular disease.   screening identifies an increased risk of proliferative retinopathy.  What does the presence of microalbuminuria indicate in someone with type 2 diabetes?  Microalbuminuria rates are very high in type 2 diabetes. Many people with T2D have microalbuminuria at the time of diagnosis. If you have T2D, you should have a test for microalbuminuria at the time of diagnosis and thereafter on an annual basis. In T2D, having microalbuminuria is a powerful message that you have an increased risk of heart disease.  I have a positive test   what now?  For reasons outlined above the following measures are advisable:  tighten up your blood sugar control   blood pressure lowering if high   Less than 130/80   exercise   lipid lowering treatment  low dose aspirin   ACE inhibitors or AII antagonists- you are on Cozaar and Aldactone both        TAKE HYGROTON DAILY 25 mg   Repeat blood pressure again and urine microalbumin and BMP 2 weeks
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
## 2018-06-28 ENCOUNTER — ALLIED HEALTH/NURSE VISIT (OUTPATIENT)
Dept: FAMILY MEDICINE | Facility: CLINIC | Age: 68
End: 2018-06-28
Payer: COMMERCIAL

## 2018-06-28 VITALS
HEIGHT: 64 IN | SYSTOLIC BLOOD PRESSURE: 148 MMHG | BODY MASS INDEX: 22.02 KG/M2 | DIASTOLIC BLOOD PRESSURE: 70 MMHG | TEMPERATURE: 97.8 F | OXYGEN SATURATION: 98 % | WEIGHT: 129 LBS | HEART RATE: 51 BPM

## 2018-06-28 DIAGNOSIS — R80.9 MICROALBUMINURIA: ICD-10-CM

## 2018-06-28 DIAGNOSIS — M54.5 LOW BACK PAIN, UNSPECIFIED BACK PAIN LATERALITY, UNSPECIFIED CHRONICITY, WITH SCIATICA PRESENCE UNSPECIFIED: Primary | ICD-10-CM

## 2018-06-28 DIAGNOSIS — I10 ESSENTIAL HYPERTENSION: ICD-10-CM

## 2018-06-28 LAB
ANION GAP SERPL CALCULATED.3IONS-SCNC: 10 MMOL/L (ref 3–14)
BUN SERPL-MCNC: 18 MG/DL (ref 7–30)
CALCIUM SERPL-MCNC: 9.6 MG/DL (ref 8.5–10.1)
CHLORIDE SERPL-SCNC: 93 MMOL/L (ref 94–109)
CO2 SERPL-SCNC: 25 MMOL/L (ref 20–32)
CREAT SERPL-MCNC: 0.91 MG/DL (ref 0.66–1.25)
GFR SERPL CREATININE-BSD FRML MDRD: 83 ML/MIN/1.7M2
GLUCOSE SERPL-MCNC: 106 MG/DL (ref 70–99)
POTASSIUM SERPL-SCNC: 3.7 MMOL/L (ref 3.4–5.3)
SODIUM SERPL-SCNC: 128 MMOL/L (ref 133–144)

## 2018-06-28 PROCEDURE — 36415 COLL VENOUS BLD VENIPUNCTURE: CPT | Performed by: INTERNAL MEDICINE

## 2018-06-28 PROCEDURE — 82043 UR ALBUMIN QUANTITATIVE: CPT | Performed by: INTERNAL MEDICINE

## 2018-06-28 PROCEDURE — 80048 BASIC METABOLIC PNL TOTAL CA: CPT | Performed by: INTERNAL MEDICINE

## 2018-06-28 PROCEDURE — 99213 OFFICE O/P EST LOW 20 MIN: CPT | Performed by: NURSE PRACTITIONER

## 2018-06-28 NOTE — PATIENT INSTRUCTIONS
You can take tylenol up to 1000mg 3 times a day for the pain     Please follow up with physical therapy

## 2018-06-28 NOTE — MR AVS SNAPSHOT
After Visit Summary   6/28/2018    Mati Pulliam    MRN: [id number]           Patient Information     Date Of Birth          1950        Visit Information        Provider Department      6/28/2018 11:15 AM Mauricio Lewis APRN CNP [facility]        Today's Diagnoses     Low back pain, unspecified back pain laterality, unspecified chronicity, with sciatica presence unspecified    -  1      Care Instructions    You can take tylenol up to 1000mg 3 times a day for the pain     Please follow up with physical therapy             Follow-ups after your visit        Additional Services     [facility] PT, HAND, AND CHIROPRACTIC REFERRAL       **This order will print in the [facility] Scheduling Office**    Physical Therapy, Hand Therapy and Chiropractic Care are available through:    *[facility]  *[facility]  *[facility]    Call one number to schedule at any of the above locations: [phone].    Your provider has referred you to: [facility] - PT and/or Chiropractic Care determined by clinical presentation at [facility] or [facility] Initial Visit    Indication/Reason for Referral: Low Back Pain  Onset of Illness: 1 week  Therapy Orders: Evaluate and Treat  Special Programs: None  Special Request: None    Aubree Mak      Additional Comments for the Therapist or Chiropractor:     Please be aware that coverage of these services is subject to the terms and limitations of your health insurance plan.  Call member services at your health plan with any benefit or coverage questions.      Please bring the following to your appointment:    *Your personal calendar for scheduling future appointments  *Comfortable clothing                  Your next 10 appointments already scheduled     Dec 13, 2018 11:00 AM CST   Office Visit with Hai Mckee MD   [facility] ([facility])    [address] [facility] [address]  "  [phone]           Bring a current list of meds and any records pertaining to this visit. For Physicals, please bring immunization records and any forms needing to be filled out. Please arrive 10 minutes early to complete paperwork.              Who to contact     If you have questions or need follow up information about today's clinic visit or your schedule please contact [facility] directly at [phone].  Normal or non-critical lab and imaging results will be communicated to you by EarDishhart, letter or phone within 4 business days after the clinic has received the results. If you do not hear from us within 7 days, please contact the clinic through ONStort or phone. If you have a critical or abnormal lab result, we will notify you by phone as soon as possible.  Submit refill requests through Kiyon or call your pharmacy and they will forward the refill request to us. Please allow 3 business days for your refill to be completed.          Additional Information About Your Visit        [employer]ish[employer] Information     [facility]on gives you secure access to your electronic health record. If you see a primary care provider, you can also send messages to your care team and make appointments. If you have questions, please call your primary care clinic.  If you do not have a primary care provider, please call [phone] and they will assist you.        Care EveryWhere ID     This is your Care EveryWhere ID. This could be used by other organizations to access your [facility] medical records  [id number]        Your Vitals Were     Pulse Temperature Height Pulse Oximetry BMI (Body Mass Index)       51 97.8  F (36.6  C) (Oral) 5' 3.5\" (1.613 m) 98% 22.49 kg/m2        Blood Pressure from Last 3 Encounters:   06/28/18 148/70   06/14/18 159/70   04/03/18 (!) 165/92    Weight from Last 3 Encounters:   06/28/18 129 lb (58.5 kg)   06/14/18 129 lb (58.5 kg)   04/03/18 129 lb (58.5 kg)              We Performed the Following  "    [facility] PT, HAND, AND CHIROPRACTIC REFERRAL          Today's Medication Changes          These changes are accurate as of 6/28/18 11:38 AM.  If you have any questions, ask your nurse or doctor.               These medicines have changed or have updated prescriptions.        Dose/Directions    fluticasone 50 MCG/ACT spray   Commonly known as:  FLONASE   This may have changed:  additional instructions   Used for:  Cough        Dose:  1-2 spray   Spray 1-2 sprays into both nostrils daily   Quantity:  1 Package   Refills:  11                Primary Care Provider Office Phone # Fax #    Addisshania Mckee [phone] [address]  [facility] [id number]        Equal Access to Services     SAVANAH WALKER : Suma Pollard, trudy murillo, neville edgar, lluvia guidry . So [facility] [phone].    ATENCIÓN: Si habla español, tiene a hamm disposición servicios gratuitos de asistencia lingüística. Llame al [phone].    We comply with applicable federal civil rights laws and [place] laws. We do not discriminate on the basis of race, color, national origin, age, disability, sex, sexual orientation, or gender identity.            Thank you!     Thank you for choosing [facility]  for your care. Our goal is always to provide you with excellent care. Hearing back from our patients is one way we can continue to improve our services. Please take a few minutes to complete the written survey that you may receive in the mail after your visit with us. Thank you!             Your Updated Medication List - Protect others around you: Learn how to safely use, store and throw away your medicines at www.disposemymeds.org.          This list is accurate as of 6/28/18 11:38 AM.  Always use your most recent med list.                   Brand Name Dispense Instructions for use Diagnosis    amLODIPine 10 MG tablet    NORVASC    90 tablet    Take 1 tablet (10 mg)  by mouth daily    Benign essential hypertension       aspirin 81 MG tablet     100    ONE DAILY    Type II or unspecified type diabetes mellitus without mention of complication, not stated as uncontrolled       blood glucose monitoring meter device kit     1 kit    Use to test blood sugar once daily or as directed.    Type 2 diabetes, HbA1C goal < 8% (H)       blood glucose monitoring test strip    ONETOUCH ULTRA    100 strip    Use to test blood sugars once daily as directed.    Type 2 diabetes, HbA1C goal < 8% (H)       carvedilol 6.25 MG tablet    COREG    180 tablet    Take 1 tablet (6.25 mg) by mouth 2 times daily (with meals)    Benign essential hypertension       chlorthalidone 25 MG tablet    HYGROTON    90 tablet    Take 1 tablet (25 mg) by mouth daily    Microalbuminuria       clopidogrel 75 MG tablet    PLAVIX    90 tablet    Take 1 tablet (75 mg) by mouth daily    History of stroke, S/P CABG (coronary artery bypass graft)       eplerenone 25 MG tablet    INSPRA    90 tablet    Take 1 tablet (25 mg) by mouth daily    Benign essential hypertension       fluticasone 50 MCG/ACT spray    FLONASE    1 Package    Spray 1-2 sprays into both nostrils daily    Cough       glimepiride 1 MG tablet    AMARYL     Take 0.5 mg by mouth every morning (before breakfast)        losartan 100 MG tablet    COZAAR    90 tablet    Take 1 tablet (100 mg) by mouth daily    Benign essential hypertension       metFORMIN 500 MG tablet    GLUCOPHAGE     Take 1,000 mg by mouth 2 times daily (with meals)        nitroGLYcerin 0.4 MG sublingual tablet    NITROSTAT    25 tablet    Place 1 tablet (0.4 mg) under the tongue See Admin Instructions for chest pain    Chronic ischemic heart disease       ONETOUCH DELICA LANCETS 33G Misc     100 each    1 Device daily    Type 2 diabetes, HbA1C goal < 8% (H)       OVER-THE-COUNTER      Beplex Forte 260  mcg every OTHER day        rosuvastatin 20 MG tablet    CRESTOR    90 tablet    Take 1 tablet  (20 mg) by mouth daily    Hyperlipidemia LDL goal <70       salicylic acid 40 % Misc    MEDIPLAST    1 each    Apply 1 dose. topically At Bedtime Each night, Scrape or loofa the area and then soak foot for 10-15 min in warm water.  Cut plaster to fit exactly over 1 wart each.  Tape each in place for overnight and remove the next morning.    Callus of foot       STUDY MEDICATION      Metformin 1000 mg bid Glimepiride 1 mg daily  Medication provided by [facility] study only.  Coordinator: Raymundo Coy -[phone] PI: Shanon Guy -[id number]        vitamin D 2000 units Caps      Take 1 capsule by mouth every other day
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
## 2018-06-29 LAB
CREAT UR-MCNC: 51 MG/DL
MICROALBUMIN UR-MCNC: 255 MG/L
MICROALBUMIN/CREAT UR: 501.97 MG/G CR (ref 0–17)

## 2018-07-03 ENCOUNTER — HOSPITAL ENCOUNTER (OUTPATIENT)
Dept: ULTRASOUND IMAGING | Facility: CLINIC | Age: 68
Discharge: HOME | End: 2018-07-03
Attending: INTERNAL MEDICINE | Admitting: INTERNAL MEDICINE
Payer: MEDICARE

## 2018-07-03 ENCOUNTER — ALLIED HEALTH/NURSE VISIT (OUTPATIENT)
Dept: FAMILY MEDICINE | Facility: CLINIC | Age: 68
End: 2018-07-03
Payer: COMMERCIAL

## 2018-07-03 ENCOUNTER — TELEPHONE (OUTPATIENT)
Dept: FAMILY MEDICINE | Facility: CLINIC | Age: 68
End: 2018-07-03

## 2018-07-03 VITALS — SYSTOLIC BLOOD PRESSURE: 122 MMHG | DIASTOLIC BLOOD PRESSURE: 72 MMHG

## 2018-07-03 DIAGNOSIS — I10 ESSENTIAL HYPERTENSION: ICD-10-CM

## 2018-07-03 DIAGNOSIS — R80.9 MICROALBUMINURIA: ICD-10-CM

## 2018-07-03 DIAGNOSIS — I10 ESSENTIAL HYPERTENSION: Primary | ICD-10-CM

## 2018-07-03 PROCEDURE — 76770 US EXAM ABDO BACK WALL COMP: CPT

## 2018-07-03 PROCEDURE — 99207 ZZC NO CHARGE NURSE ONLY: CPT | Performed by: INTERNAL MEDICINE

## 2018-07-03 NOTE — MR AVS SNAPSHOT
After Visit Summary   7/3/2018    Mati Pulliam    MRN: [id number]           Patient Information     Date Of Birth          1950        Visit Information        Provider Department      7/3/2018 11:37 AM Hai Mckee MD [facility]        Today's Diagnoses     Essential hypertension    -  1       Follow-ups after your visit        Your next 10 appointments already scheduled     Dec 13, 2018 11:00 AM CST   Office Visit with Hai Mckee MD   [facility] ([facility])    [address] [facility] [address] [phone]           Bring a current list of meds and any records pertaining to this visit. For Physicals, please bring immunization records and any forms needing to be filled out. Please arrive 10 minutes early to complete paperwork.              Who to contact     If you have questions or need follow up information about today's clinic visit or your schedule please contact [facility] directly at [phone].  Normal or non-critical lab and imaging results will be communicated to you by [employer], letter or phone within 4 business days after the clinic has received the results. If you do not hear from us within 7 days, please contact the clinic through [employer] or phone. If you have a critical or abnormal lab result, we will notify you by phone as soon as possible.  Submit refill requests through [employer] or call your pharmacy and they will forward the refill request to us. Please allow 3 business days for your refill to be completed.          Additional Information About Your Visit        [employer] Information     [employer] gives you secure access to your electronic health record. If you see a primary care provider, you can also send messages to your care team and make appointments. If you have questions, please call your primary care clinic.  If you do not have a primary care provider, please call [phone] and they will assist you.         Care EveryWhere ID     This is your Care EveryWhere ID. This could be used by other organizations to access your [place] medical records  [id number]         Blood Pressure from Last 3 Encounters:   07/03/18 122/72   06/28/18 148/70   06/14/18 159/70    Weight from Last 3 Encounters:   06/28/18 129 lb (58.5 kg)   06/14/18 129 lb (58.5 kg)   04/03/18 129 lb (58.5 kg)              Today, you had the following     No orders found for display         Today's Medication Changes          These changes are accurate as of 7/3/18 11:38 AM.  If you have any questions, ask your nurse or doctor.               These medicines have changed or have updated prescriptions.        Dose/Directions    fluticasone 50 MCG/ACT spray   Commonly known as:  FLONASE   This may have changed:  additional instructions   Used for:  Cough        Dose:  1-2 spray   Spray 1-2 sprays into both nostrils daily   Quantity:  1 Package   Refills:  11                Primary Care Provider Office Phone # Fax #    Bhavdeidrat MD Rylee [phone] [address]  [facility] [address]        Equal Access to Services     [facility]: Hadii desiree Pollard, waaxda luharoon, qaybta kaalmada adedionna, lluvia guidry . So [facility] [phone].    ATENCIÓN: Si habla español, tiene a hamm disposición servicios gratuitos de asistencia lingüística. Llame al [phone].    We comply with applicable federal civil rights laws and [place] laws. We do not discriminate on the basis of race, color, national origin, age, disability, sex, sexual orientation, or gender identity.            Thank you!     Thank you for choosing [facility]  for your care. Our goal is always to provide you with excellent care. Hearing back from our patients is one way we can continue to improve our services. Please take a few minutes to complete the written survey that you may receive in the mail after your visit with us. Thank  you!             Your Updated Medication List - Protect others around you: Learn how to safely use, store and throw away your medicines at www.disposemymeds.org.          This list is accurate as of 7/3/18 11:38 AM.  Always use your most recent med list.                   Brand Name Dispense Instructions for use Diagnosis    amLODIPine 10 MG tablet    NORVASC    90 tablet    Take 1 tablet (10 mg) by mouth daily    Benign essential hypertension       aspirin 81 MG tablet     100    ONE DAILY    Type II or unspecified type diabetes mellitus without mention of complication, not stated as uncontrolled       blood glucose monitoring meter device kit     1 kit    Use to test blood sugar once daily or as directed.    Type 2 diabetes, HbA1C goal < 8% (H)       blood glucose monitoring test strip    ONETOUCH ULTRA    100 strip    Use to test blood sugars once daily as directed.    Type 2 diabetes, HbA1C goal < 8% (H)       carvedilol 6.25 MG tablet    COREG    180 tablet    Take 1 tablet (6.25 mg) by mouth 2 times daily (with meals)    Benign essential hypertension       chlorthalidone 25 MG tablet    HYGROTON    90 tablet    Take 1 tablet (25 mg) by mouth daily    Microalbuminuria       clopidogrel 75 MG tablet    PLAVIX    90 tablet    Take 1 tablet (75 mg) by mouth daily    History of stroke, S/P CABG (coronary artery bypass graft)       eplerenone 25 MG tablet    INSPRA    90 tablet    Take 1 tablet (25 mg) by mouth daily    Benign essential hypertension       fluticasone 50 MCG/ACT spray    FLONASE    1 Package    Spray 1-2 sprays into both nostrils daily    Cough       glimepiride 1 MG tablet    AMARYL     Take 0.5 mg by mouth every morning (before breakfast)        losartan 100 MG tablet    COZAAR    90 tablet    Take 1 tablet (100 mg) by mouth daily    Benign essential hypertension       metFORMIN 500 MG tablet    GLUCOPHAGE     Take 1,000 mg by mouth 2 times daily (with meals)        nitroGLYcerin 0.4 MG sublingual  tablet    NITROSTAT    25 tablet    Place 1 tablet (0.4 mg) under the tongue See Admin Instructions for chest pain    Chronic ischemic heart disease       ONETOUCH DELICA LANCETS 33G Misc     100 each    1 Device daily    Type 2 diabetes, HbA1C goal < 8% (H)       OVER-THE-COUNTER      Beplex Forte 260  mcg every OTHER day        rosuvastatin 20 MG tablet    CRESTOR    90 tablet    Take 1 tablet (20 mg) by mouth daily    Hyperlipidemia LDL goal <70       salicylic acid 40 % Misc    MEDIPLAST    1 each    Apply 1 dose. topically At Bedtime Each night, Scrape or loofa the area and then soak foot for 10-15 min in warm water.  Cut plaster to fit exactly over 1 wart each.  Tape each in place for overnight and remove the next morning.    Callus of foot       STUDY MEDICATION      Metformin 1000 mg bid Glimepiride 1 mg daily  Medication provided by [facility] study only.  Coordinator: Raymundo Coy -[phone] PI: Shanon Guy -[phone]        vitamin D 2000 units Caps      Take 1 capsule by mouth every other day

## 2018-07-03 NOTE — TELEPHONE ENCOUNTER
I called the patient to let him know about referral. He would like for me to [facility] So I have attached this information to the [place] results.    Balta also has question about chlorithalidone 25 mg. He states that when he takes this he feels lot more tired and sluggish during day and especially at night. Is there any advice or precaution to take?    Jamil rPieto, [facility]

## 2018-07-03 NOTE — PROGRESS NOTES
Mati Pulliam is enrolled/participating in the retail pharmacy Blood Pressure Goals Achievement Program (BPGAP).  Mati Pulliam was evaluated at [facility] on July 3, 2018 at which time his blood pressure was:    BP Readings from Last 3 Encounters:   07/03/18 122/72   06/28/18 148/70   06/14/18 159/70     Reviewed lifestyle modifications for blood pressure control and reduction: including making healthy food choices, managing weight, getting regular exercise, smoking cessation, reducing alcohol consumption, monitoring blood pressure regularly.     Mati Pulliam is not experiencing symptoms. Patient reports being tired for 3 days    Follow-Up: BP is at goal of < 150/90 mmHg (patient 60+ years of age without diabetes).  Recommended follow-up at pharmacy in 6 months.     Recommendation to Provider: bp follow up within 6 months.     Mati Pulliam was evaluated for enrollment into the PGEN study today.    Patient eligible for enrollment:  No  Patient interested in enrollment:  No    Completed by: Liat Fierro, PharmD, [facility]     [facility]  [phone]

## 2018-07-03 NOTE — TELEPHONE ENCOUNTER
Patient returning call, please call him back  [facility] Unit Coordinator

## 2018-07-03 NOTE — TELEPHONE ENCOUNTER
Left message for patient to return call to clinic.  Cristy Roth MA        Per Dr. Mckee: Please call patient   He should see vascular cardiology like Dr Warner  Referral in

## 2018-07-05 ENCOUNTER — DOCUMENTATION ONLY (OUTPATIENT)
Dept: CARDIOLOGY | Facility: CLINIC | Age: 68
End: 2018-07-05

## 2018-07-05 ENCOUNTER — OFFICE VISIT (OUTPATIENT)
Dept: FAMILY MEDICINE | Facility: CLINIC | Age: 68
End: 2018-07-05
Payer: COMMERCIAL

## 2018-07-05 VITALS — OXYGEN SATURATION: 99 % | DIASTOLIC BLOOD PRESSURE: 76 MMHG | HEART RATE: 50 BPM | SYSTOLIC BLOOD PRESSURE: 138 MMHG

## 2018-07-05 DIAGNOSIS — R07.9 ACUTE CHEST PAIN: Primary | ICD-10-CM

## 2018-07-05 DIAGNOSIS — I15.0 RENAL ARTERIAL HYPERTENSION: ICD-10-CM

## 2018-07-05 DIAGNOSIS — E11.59 TYPE 2 DIABETES MELLITUS WITH VASCULAR DISEASE (H): ICD-10-CM

## 2018-07-05 DIAGNOSIS — R80.9 PROTEINURIA, UNSPECIFIED TYPE: ICD-10-CM

## 2018-07-05 DIAGNOSIS — I10 BENIGN ESSENTIAL HYPERTENSION: Primary | ICD-10-CM

## 2018-07-05 DIAGNOSIS — R53.83 OTHER FATIGUE: ICD-10-CM

## 2018-07-05 LAB
ANION GAP SERPL CALCULATED.3IONS-SCNC: 6 MMOL/L (ref 3–14)
BUN SERPL-MCNC: 12 MG/DL (ref 7–30)
CALCIUM SERPL-MCNC: 9.2 MG/DL (ref 8.5–10.1)
CHLORIDE SERPL-SCNC: 90 MMOL/L (ref 94–109)
CO2 SERPL-SCNC: 29 MMOL/L (ref 20–32)
CREAT SERPL-MCNC: 0.84 MG/DL (ref 0.66–1.25)
GFR SERPL CREATININE-BSD FRML MDRD: >90 ML/MIN/1.7M2
GLUCOSE SERPL-MCNC: 173 MG/DL (ref 70–99)
LDLC SERPL DIRECT ASSAY-MCNC: 43 MG/DL
POTASSIUM SERPL-SCNC: 4 MMOL/L (ref 3.4–5.3)
SODIUM SERPL-SCNC: 125 MMOL/L (ref 133–144)

## 2018-07-05 PROCEDURE — 93000 ELECTROCARDIOGRAM COMPLETE: CPT | Performed by: INTERNAL MEDICINE

## 2018-07-05 PROCEDURE — 83721 ASSAY OF BLOOD LIPOPROTEIN: CPT | Performed by: INTERNAL MEDICINE

## 2018-07-05 PROCEDURE — 80048 BASIC METABOLIC PNL TOTAL CA: CPT | Performed by: INTERNAL MEDICINE

## 2018-07-05 PROCEDURE — 36415 COLL VENOUS BLD VENIPUNCTURE: CPT | Performed by: INTERNAL MEDICINE

## 2018-07-05 PROCEDURE — 99214 OFFICE O/P EST MOD 30 MIN: CPT | Performed by: INTERNAL MEDICINE

## 2018-07-05 RX ORDER — CHLORTHALIDONE 25 MG/1
12.5 TABLET ORAL DAILY
Qty: 90 TABLET | Refills: 11 | COMMUNITY
Start: 2018-07-05 | End: 2018-07-09

## 2018-07-05 RX ORDER — CARVEDILOL 6.25 MG/1
TABLET ORAL
Qty: 180 TABLET | Refills: 2 | COMMUNITY
Start: 2018-07-05 | End: 2018-07-09

## 2018-07-05 NOTE — MR AVS SNAPSHOT
After Visit Summary   7/5/2018    Mati Pulliam    MRN: [id number]           Patient Information     Date Of Birth          1950        Visit Information        Provider Department      7/5/2018 8:00 AM Hai Mckee MD [facility]        Today's Diagnoses     Chest pain    -  1    Renal arterial hypertension        Proteinuria, unspecified type        Type 2 diabetes mellitus with vascular disease (H)        Microalbuminuria        Other fatigue        Benign essential hypertension          Care Instructions    Carvedilol    Take one tablet in PM, half tablet in AM          Follow-ups after your visit        Your next 10 appointments already scheduled     Jul 09, 2018  1:15 PM CDT   New Visit with Grant Morris MD   [facility] ([facility])    [address] [facility] [address]  [facility] [address] [phone] OPT 2            Dec 13, 2018 11:00 AM CST   Office Visit with Hai Mckee MD   [facility] ([facility])    [address] [facility] [id number] [phone]           Bring a current list of meds and any records pertaining to this visit. For Physicals, please bring immunization records and any forms needing to be filled out. Please arrive 10 minutes early to complete paperwork.              Future tests that were ordered for you today     Open Future Orders        Priority Expected Expires Ordered    Follow-Up with Cardiologist Routine 7/12/2018 7/5/2019 7/5/2018            Who to contact     If you have questions or need follow up information about today's clinic visit or your schedule please contact [facility] directly at [phone].  Normal or non-critical lab and imaging results will be communicated to you by MyChart, letter or phone within 4 business days after the clinic has received the results. If you do not hear from us within 7 days, please contact the clinic through  [employer]t or phone. If you have a critical or abnormal lab result, we will notify you by phone as soon as possible.  Submit refill requests through [employer] or call your pharmacy and they will forward the refill request to us. Please allow 3 business days for your refill to be completed.          Additional Information About Your Visit        [employer] Information     [employer] gives you secure access to your electronic health record. If you see a primary care provider, you can also send messages to your care team and make appointments. If you have questions, please call your primary care clinic.  If you do not have a primary care provider, please call [phone] and they will assist you.        Care EveryWhere ID     This is your Care EveryWhere ID. This could be used by other organizations to access your [place] medical records  [id number]        Your Vitals Were     Pulse Pulse Oximetry                50 99%           Blood Pressure from Last 3 Encounters:   07/05/18 138/76   07/03/18 122/72   06/28/18 148/70    Weight from Last 3 Encounters:   06/28/18 129 lb (58.5 kg)   06/14/18 129 lb (58.5 kg)   04/03/18 129 lb (58.5 kg)              We Performed the Following     Basic metabolic panel     EKG 12-lead complete w/read - Clinics     EKG 12-lead, tracing only          Today's Medication Changes          These changes are accurate as of 7/5/18  9:23 AM.  If you have any questions, ask your nurse or doctor.               These medicines have changed or have updated prescriptions.        Dose/Directions    carvedilol 6.25 MG tablet   Commonly known as:  COREG   This may have changed:    - how much to take  - how to take this  - when to take this  - additional instructions   Used for:  Benign essential hypertension   Changed by:  Hai Mckee MD        Take one tablet in PM, half tablet in AM   Quantity:  180 tablet   Refills:  2       chlorthalidone 25 MG tablet   Commonly known as:  HYGROTON   This may have  changed:  how much to take   Used for:  Microalbuminuria   Changed by:  Hai Mckee MD        Dose:  12.5 mg   Take 0.5 tablets (12.5 mg) by mouth daily   Quantity:  90 tablet   Refills:  11       fluticasone 50 MCG/ACT spray   Commonly known as:  FLONASE   This may have changed:  additional instructions   Used for:  Cough        Dose:  1-2 spray   Spray 1-2 sprays into both nostrils daily   Quantity:  1 Package   Refills:  11                Primary Care Provider Office Phone # Fax #    Hai Mckee [phone] [address]  [facility] [address]        Equal Access to Services     [facility]: Hadii desiree ruano hadlilian Sobarbara, waaxda lurafaelaadaha, qaybta mychalalmada herve, lluvia guidry . So [facility] [phone].    ATENCIÓN: Si habla español, tiene a hamm disposición servicios gratuitos de asistencia lingüística. Llame al [phone].    We comply with applicable federal civil rights laws and [place] laws. We do not discriminate on the basis of race, color, national origin, age, disability, sex, sexual orientation, or gender identity.            Thank you!     Thank you for choosing [facility]  for your care. Our goal is always to provide you with excellent care. Hearing back from our patients is one way we can continue to improve our services. Please take a few minutes to complete the written survey that you may receive in the mail after your visit with us. Thank you!             Your Updated Medication List - Protect others around you: Learn how to safely use, store and throw away your medicines at www.disposemymeds.org.          This list is accurate as of 7/5/18  9:23 AM.  Always use your most recent med list.                   Brand Name Dispense Instructions for use Diagnosis    amLODIPine 10 MG tablet    NORVASC    90 tablet    Take 1 tablet (10 mg) by mouth daily    Benign essential hypertension       aspirin 81 MG tablet     100    ONE  DAILY    Type II or unspecified type diabetes mellitus without mention of complication, not stated as uncontrolled       blood glucose monitoring meter device kit     1 kit    Use to test blood sugar once daily or as directed.    Type 2 diabetes, HbA1C goal < 8% (H)       blood glucose monitoring test strip    ONETOUCH ULTRA    100 strip    Use to test blood sugars once daily as directed.    Type 2 diabetes, HbA1C goal < 8% (H)       carvedilol 6.25 MG tablet    COREG    180 tablet    Take one tablet in PM, half tablet in AM    Benign essential hypertension       chlorthalidone 25 MG tablet    HYGROTON    90 tablet    Take 0.5 tablets (12.5 mg) by mouth daily    Microalbuminuria       clopidogrel 75 MG tablet    PLAVIX    90 tablet    Take 1 tablet (75 mg) by mouth daily    History of stroke, S/P CABG (coronary artery bypass graft)       eplerenone 25 MG tablet    INSPRA    90 tablet    Take 1 tablet (25 mg) by mouth daily    Benign essential hypertension       fluticasone 50 MCG/ACT spray    FLONASE    1 Package    Spray 1-2 sprays into both nostrils daily    Cough       glimepiride 1 MG tablet    AMARYL     Take 0.5 mg by mouth every morning (before breakfast)        losartan 100 MG tablet    COZAAR    90 tablet    Take 1 tablet (100 mg) by mouth daily    Benign essential hypertension       metFORMIN 500 MG tablet    GLUCOPHAGE     Take 1,000 mg by mouth 2 times daily (with meals)        nitroGLYcerin 0.4 MG sublingual tablet    NITROSTAT    25 tablet    Place 1 tablet (0.4 mg) under the tongue See Admin Instructions for chest pain    Chronic ischemic heart disease       ONETOUCH DELICA LANCETS 33G Misc     100 each    1 Device daily    Type 2 diabetes, HbA1C goal < 8% (H)       OVER-THE-COUNTER      Beplex Forte 260  mcg every OTHER day        rosuvastatin 20 MG tablet    CRESTOR    90 tablet    Take 1 tablet (20 mg) by mouth daily    Hyperlipidemia LDL goal <70       salicylic acid 40 % Misc    MEDIPLAST    1  each    Apply 1 dose. topically At Bedtime Each night, Scrape or loofa the area and then soak foot for 10-15 min in warm water.  Cut plaster to fit exactly over 1 wart each.  Tape each in place for overnight and remove the next morning.    Callus of foot       STUDY MEDICATION      Metformin 1000 mg bid Glimepiride 1 mg daily  Medication provided by [facility] study only.  Coordinator: Raymundo Coy -[id number] PI: Shanon Guy -[id number]        vitamin D 2000 units Caps      Take 1 capsule by mouth every other day
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
## 2018-07-05 NOTE — TELEPHONE ENCOUNTER
Spoke with patient. According to him, he saw  this morning and went over his medication.    Kayy Johns CMA

## 2018-07-05 NOTE — PROGRESS NOTES
I have received a call from Dr. Mckee.  Mati has been having high blood pressure in spite of medication adjustments.  Her renal ultrasound was done and looks indicative of renal artery stenosis.  He is also had some atypical chest pain that she does not feel is cardiac.  She wants this patient seen with 3-4 days.  His usual providers are My and Dr. Vasquez were not available.  She is requesting that eventually the patient see Dr. Warner.    I have set him up with Dr. Morris to start the process of further investigation of the renal artery and perhaps refer to Timmy if possible.  Just wanted to let you know for chart prep purposes

## 2018-07-09 ENCOUNTER — OFFICE VISIT (OUTPATIENT)
Dept: CARDIOLOGY | Facility: CLINIC | Age: 68
End: 2018-07-09
Payer: COMMERCIAL

## 2018-07-09 VITALS
HEART RATE: 48 BPM | DIASTOLIC BLOOD PRESSURE: 75 MMHG | OXYGEN SATURATION: 98 % | SYSTOLIC BLOOD PRESSURE: 153 MMHG | HEIGHT: 64 IN | WEIGHT: 131.4 LBS | BODY MASS INDEX: 22.43 KG/M2

## 2018-07-09 DIAGNOSIS — Z95.1 STATUS POST AORTO-CORONARY ARTERY BYPASS GRAFT: ICD-10-CM

## 2018-07-09 DIAGNOSIS — Z86.73 HISTORY OF STROKE: ICD-10-CM

## 2018-07-09 DIAGNOSIS — I15.0 RENOVASCULAR HYPERTENSION: ICD-10-CM

## 2018-07-09 DIAGNOSIS — R07.9 CHEST PAIN, UNSPECIFIED TYPE: Primary | ICD-10-CM

## 2018-07-09 DIAGNOSIS — E11.9 TYPE 2 DIABETES MELLITUS WITHOUT COMPLICATION, WITHOUT LONG-TERM CURRENT USE OF INSULIN (H): ICD-10-CM

## 2018-07-09 PROCEDURE — 93000 ELECTROCARDIOGRAM COMPLETE: CPT | Performed by: INTERNAL MEDICINE

## 2018-07-09 PROCEDURE — 99215 OFFICE O/P EST HI 40 MIN: CPT | Performed by: INTERNAL MEDICINE

## 2018-07-09 RX ORDER — CARVEDILOL 3.12 MG/1
3.12 TABLET ORAL 2 TIMES DAILY WITH MEALS
Qty: 30 TABLET | Refills: 3 | Status: SHIPPED | OUTPATIENT
Start: 2018-07-09 | End: 2018-08-21

## 2018-07-09 RX ORDER — AMLODIPINE BESYLATE 5 MG/1
5 TABLET ORAL 2 TIMES DAILY
Qty: 60 TABLET | Refills: 3 | Status: SHIPPED | OUTPATIENT
Start: 2018-07-09 | End: 2018-09-10

## 2018-07-09 NOTE — PATIENT INSTRUCTIONS
MEDICATION CHANGES:  1. Stop chlorthalidone.  2. Split amlodipine into 5 mg two times daily (instead of 10 mg daily).   3. Continue losartan 100 mg daily, eplerenone 25 mg daily.  4. Decrease carvedilol to 3.125 mg two times daily.    FOLLOW UP:  1. In my clinic in 7-10 days. Previsit blood tests (comprehensive metabolic panel, TSH with reflex free T4).  2. Appointment with Dr. Warner will be scheduled in 6 weeks.    TESTIN. 24-hour heart rhythm monitor (Holter) in three days.  2. Cardiac MRI with stress in 3 weeks.    If you have any questions or concerns, please call my nurse Leah Stephens at [phone].

## 2018-07-09 NOTE — LETTER
7/9/2018    Hai Mckee MD  [address]  [place] [address]    RE: Mati Pulliam       Dear Colleague,    I had the pleasure of seeing Mati Pulliam in the [facility].    Service Date: 07/09/2018      PRIMARY CARE AND REFERRING PROVIDER:  Hai Mckee MD      PRIMARY CARDIOLOGY TEAM:  Mars Vasquez MD, Vikki Khan, APRN, CNP      REASON FOR VISIT:   1.  Chest pain.   2.  Renal artery stenosis.   3.  Hypertension.      HISTORY OF PRESENT ILLNESS:  The patient is new to me, but he has established cardiology care with Dr. Vasquez and Vikki Khan.  I am seeing him due to limited availability with Dr. Vasquez.  The patient is a 67-year-old gentleman of [place] ancestry and was accompanied by his wife and adult son.  He is known to have a stable CAD, status post CABG in 2009 (LIMA to LAD, vein graft to diagonal, OM2 and a separate vein graft to the right coronary posterolateral branch), normal left ventricle systolic function, a left middle cerebral artery embolus stroke postoperatively after his bypass surgery (no motor deficit, but has trouble with word finding), type 2 diabetes mellitus with excellent control on oral medications (HbA1c 6.3%), historically lately suboptimally controlled systolic hypertension, BMI 28.3 kg/m2, never tobacco user.      1.  Chest pain:    A few days ago, the patient was in the car with his wife when he had 3 or 4 recurrent episodes of twinges of chest pain that responded to 3 doses of sublingual nitroglycerin.  He says this is reminiscent of his historical angina.  He gets angina very infrequently since the CABG in 2009.  He has not had any since that episode.  His ECG done today shows marked sinus bradycardia of 44 BPM with first-degree AV block (NM interval 236 milliseconds) with a normal QTc of 434 milliseconds.  I note that he is on 9.75 mg of carvedilol daily.  However, only yesterday he walked 4 miles without  any angina or symptoms of chronotropic incompetence which is fatigue.  He finds the treadmill challenging due to knee pain.   2.  Hypertension.   The patient has had hypertension for many years.  Historically, his systolic blood pressure has been more challenging over the last few months.  Today it was 153/75.     He is currently on:   amlodipine 10 mg in the evening.   chlorthalidone 12.5 mg daily.   carvedilol 6.25 mg a.m., 3.125 mg p.m.   eplerenone 25 mg daily.   losartan 100 mg daily.        Dr. Mckee obtained a renal ultrasound to evaluate his persistent systolic hypertension and it showed elevated velocities at the origin and mid right renal artery suggesting renal artery stenosis but no left renal artery stenosis.  Dr. Mckee has referred him to see Dr. Warner and patient is awaiting an appointment.      However, he does have issues of lower extremity edema with the amlodipine and is significantly hyponatremic with serial sodiums of 128 and 125 with a normal potassium of 4.0 and creatinine of 0.84, BUN 12.  Lately he describes salt craving, but overall his dietary sodium intake is normal.      CURRENT MEDICATIONS:   1.  Amlodipine 10 mg at bedtime.   2.  Aspirin 81 mg daily.   3.  Carvedilol 6.125 mg morning and 3.125 mg p.m.   4.  Clopidogrel 75 mg daily.   5.  Eplerenone 25 mg daily.   6.  Glimepiride 0.5 mg daily.   7.  Fluticasone nasal spray   8.  Losartan 100 mg daily.   9.  Metformin 1000 mg b.i.d.   10.  Sublingual nitroglycerin.   11.  Ranitidine 150 mg daily.   12.  Rosuvastatin 20 mg daily.      PHYSICAL EXAMINATION:   VITAL SIGNS:  /75 mmHg.  Pulse 44 per minute, sinus bradycardia.  Height 1.613 meters (5 feet 5 inches, weight 60 kg (131 pounds) sats 98% on room air, respiratory rate 14 per minute, BMI 23 kg/m2.   CONSTITUTIONAL:  Slim body habitus, comfortable at rest, has apparent dysarthria from his previous stroke, average developed and nourished, cooperative, alert, oriented.   EYES:  No  pallor or icterus.   ENT:  No cyanosis or pallor.   NECK:  Normal JVP.  Normal carotid pulses.  No bruit.  No thyromegaly.   RESPIRATORY:  Clear to auscultation.   CARDIOVASCULAR:  Normal apical impulse.  Heart sounds are regular.  No audible murmur, S3 or S4.   ABDOMEN:  Soft, nontender.   SKIN:  No rash, erythema.   EXTREMITIES:  No edema.      DIAGNOSES:   1.  Chest pain, unspecified.   2.  Status post CABG x3 in 2009 (LIMA to LAD, sequential vein graft to diagonal and OM2, vein graft to right PLV branch).   3.  History of post-CABG left MCA embolic stroke with residual dysarthria.     4.  Right renal artery stenosis.   5.  Resistant hypertension.   6.  Hyponatremia.   7.  Significant sinus bradycardia secondary to medication effect.   8.  Type 2 diabetes mellitus without complication without long-term use of insulin.      ASSESSMENT AND PLAN:   1.  Chest pain.  It sounds atypical, but patient reports that his pre-CABG symptoms were also atypical, so we will proceed with a cardiac MRI stress in 3 weeks (after his blood pressure has been optimally controlled).   2.  Resistant hypertension with question of right renal artery stenosis.  It is likely that he has true renal artery stenosis given how many medications he is requiring.  However, his current antihypertensive regimen needs to be altered due to the presence of significant hyponatremia and bradycardia.  I recommend the following changes.   -- Stop chlorthalidone.   -- Decrease carvedilol to 3.125 mg b.i.d.   -- Split amlodipine into 5 mg b.i.d.   -- Continue losartan 100 mg daily and eplerenone 25 mg daily.   -- 24-hour Holter at the lower dose of carvedilol.   -- Nurse visit week to check blood pressure.   -- Followup in my clinic in 2 weeks with previsit labs.   -- Cardiac MRI stress.   -- Refer to Dr. Warner in 6-8 weeks for additional evaluation of renal artery stenosis.  I suspect he will require a CTA prior to that.      It was my pleasure to visit with  Mr. Billy.  I spent 45 minutes with him, greater than 50% of the time was spent in counseling and coordination of care.      cc:   Mars Vasquez MD   [facility]   [id number]   [place] [id number]      Hai Mckee MD    [facility]   [address]   [place] [id number]         ELLY GUALLPA MD             D: 2018   T: 2018   MT:       Name:     CORINE BILLY   MRN:      [id number]        Account:      [id number]   :      1950           Service Date: 2018      Document: [id number]       Thank you for allowing me to participate in the care of your patient.      Sincerely,     Elly Guallpa MD     [facility]    cc:   Hai Mckee MD  [address]  [place] [id number]

## 2018-07-09 NOTE — LETTER
7/9/2018      Hai Mckee MD  [address]  [place] [address]      RE: Mati Pulliam       Dear Colleague,    I had the pleasure of seeing Mati Pulliam in the [facility].    Service Date: 07/09/2018      PRIMARY CARE AND REFERRING PROVIDER:  Hai Mckee MD      PRIMARY CARDIOLOGY TEAM:  Mars Vasquez MD, Vikki Khan, APRN, CNP      REASON FOR VISIT:   1.  Chest pain.   2.  Renal artery stenosis.   3.  Hypertension.      HISTORY OF PRESENT ILLNESS:  The patient is new to me, but he has established cardiology care with Dr. Vasquez and Vikki Khan.  I am seeing him due to limited availability with Dr. Vasquez.  The patient is a 67-year-old gentleman of  ([place] ) ancestry and was accompanied by his wife and adult son.  He is known to have a stable CAD, status post CABG in 2009 (LIMA to LAD, vein graft to diagonal, OM2 and a separate vein graft to the right coronary posterolateral branch), normal left ventricle systolic function, a left middle cerebral artery embolus stroke postoperatively after his bypass surgery (no motor deficit, but has trouble with word finding), type 2 diabetes mellitus with excellent control on oral medications (HbA1c 6.3%), historically lately suboptimally controlled systolic hypertension, BMI 28.3 kg/m2, never tobacco user.      1.  Chest pain:    A few days ago, the patient was in the car with his wife when he had 3 or 4 recurrent episodes of twinges of chest pain that responded to 3 doses of sublingual nitroglycerin.  He says this is reminiscent of his historical angina.  He gets angina very infrequently since the CABG in 2009.  He has not had any since that episode.  His ECG done today shows marked sinus bradycardia of 44 BPM with first-degree AV block (NH interval 236 milliseconds) with a normal QTc of 434 milliseconds.  I note that he is on 9.75 mg of carvedilol daily.  However, only yesterday he walked 4 miles  without any angina or symptoms of chronotropic incompetence which is fatigue.  He finds the treadmill challenging due to knee pain.   2.  Hypertension.   The patient has had hypertension for many years.  Historically, his systolic blood pressure has been more challenging over the last few months.  Today it was 153/75.     He is currently on:   amlodipine 10 mg in the evening.   chlorthalidone 12.5 mg daily.   carvedilol 6.25 mg a.m., 3.125 mg p.m.   eplerenone 25 mg daily.   losartan 100 mg daily.        Dr. Mckee obtained a renal ultrasound to evaluate his persistent systolic hypertension and it showed elevated velocities at the origin and mid right renal artery suggesting renal artery stenosis but no left renal artery stenosis.  Dr. Mckee has referred him to see Dr. Warner and patient is awaiting an appointment.      However, he does have issues of lower extremity edema with the amlodipine and is significantly hyponatremic with serial sodiums of 128 and 125 with a normal potassium of 4.0 and creatinine of 0.84, BUN 12.  Lately he describes salt craving, but overall his dietary sodium intake is normal.      CURRENT MEDICATIONS:   1.  Amlodipine 10 mg at bedtime.   2.  Aspirin 81 mg daily.   3.  Carvedilol 6.125 mg morning and 3.125 mg p.m.   4.  Clopidogrel 75 mg daily.   5.  Eplerenone 25 mg daily.   6.  Glimepiride 0.5 mg daily.   7.  Fluticasone nasal spray   8.  Losartan 100 mg daily.   9.  Metformin 1000 mg b.i.d.   10.  Sublingual nitroglycerin.   11.  Ranitidine 150 mg daily.   12.  Rosuvastatin 20 mg daily.      PHYSICAL EXAMINATION:   VITAL SIGNS:  /75 mmHg.  Pulse 44 per minute, sinus bradycardia.  Height 1.613 meters (5 feet 5 inches, weight 60 kg (131 pounds) sats 98% on room air, respiratory rate 14 per minute, BMI 23 kg/m2.   CONSTITUTIONAL:  Slim body habitus, comfortable at rest, has apparent dysarthria from his previous stroke, average developed and nourished, cooperative, alert, oriented.    EYES:  No pallor or icterus.   ENT:  No cyanosis or pallor.   NECK:  Normal JVP.  Normal carotid pulses.  No bruit.  No thyromegaly.   RESPIRATORY:  Clear to auscultation.   CARDIOVASCULAR:  Normal apical impulse.  Heart sounds are regular.  No audible murmur, S3 or S4.   ABDOMEN:  Soft, nontender.   SKIN:  No rash, erythema.   EXTREMITIES:  No edema.      DIAGNOSES:   1.  Chest pain, unspecified.   2.  Status post CABG x3 in 2009 (LIMA to LAD, sequential vein graft to diagonal and OM2, vein graft to right PLV branch).   3.  History of post-CABG left MCA embolic stroke with residual dysarthria.     4.  Right renal artery stenosis.   5.  Resistant hypertension.   6.  Hyponatremia.   7.  Significant sinus bradycardia secondary to medication effect.   8.  Type 2 diabetes mellitus without complication without long-term use of insulin.      ASSESSMENT AND PLAN:   1.  Chest pain.  It sounds atypical, but patient reports that his pre-CABG symptoms were also atypical, so we will proceed with a cardiac MRI stress in 3 weeks (after his blood pressure has been optimally controlled).   2.  Resistant hypertension with question of right renal artery stenosis.  It is likely that he has true renal artery stenosis given how many medications he is requiring.  However, his current antihypertensive regimen needs to be altered due to the presence of significant hyponatremia and bradycardia.  I recommend the following changes.   -- Stop chlorthalidone.   -- Decrease carvedilol to 3.125 mg b.i.d.   -- Split amlodipine into 5 mg b.i.d.   -- Continue losartan 100 mg daily and eplerenone 25 mg daily.   -- 24-hour Holter at the lower dose of carvedilol.   -- Nurse visit week to check blood pressure.   -- Followup in my clinic in 2 weeks with previsit labs.   -- Cardiac MRI stress.   -- Refer to Dr. Warner in 6-8 weeks for additional evaluation of renal artery stenosis.  I suspect he will require a CTA prior to that.      It was my pleasure to  visit with Mr. Billy.  I spent 45 minutes with him, greater than 50% of the time was spent in counseling and coordination of care.      cc:   Mars Vasquez MD   [facility]   [id number]   [place] [address]      Hai Mckee MD    [facility]   [address]   [place] [address]         [facility] FAIZAN GUALLPA MD             D: 2018   T: 2018   MT:       Name:     CORINE BILLY   MRN:      [id number]        Account:      [id number]   :      1950           Service Date: 2018      Document: [id number]       Outpatient Encounter Prescriptions as of 2018   Medication Sig Dispense Refill     amLODIPine (NORVASC) 5 MG tablet Take 1 tablet (5 mg) by mouth 2 times daily 60 tablet 3     ASPIRIN 81 MG OR TABS ONE DAILY 100 3     blood glucose (ONE TOUCH ULTRA) test strip Use to test blood sugars once daily as directed. 100 strip PRN     blood glucose monitoring (ONE TOUCH ULTRA MINI) meter device kit Use to test blood sugar once daily or as directed. 1 kit      carvedilol (COREG) 3.125 MG tablet Take 1 tablet (3.125 mg) by mouth 2 times daily (with meals) 30 tablet 3     Cholecalciferol (VITAMIN D) 2000 UNITS CAPS Take 1 capsule by mouth every other day       clopidogrel (PLAVIX) 75 MG tablet Take 1 tablet (75 mg) by mouth daily 90 tablet 2     eplerenone (INSPRA) 25 MG tablet Take 1 tablet (25 mg) by mouth daily 90 tablet 3     fluticasone (FLONASE) 50 MCG/ACT nasal spray Spray 1-2 sprays into both nostrils daily (Patient taking differently: Spray 1-2 sprays into both nostrils daily PRN) 1 Package 11     glimepiride (AMARYL) 1 MG tablet Take 0.5 mg by mouth every morning (before breakfast)        losartan (COZAAR) 100 MG tablet Take 1 tablet (100 mg) by mouth daily 90 tablet 2     metFORMIN (GLUCOPHAGE) 500 MG tablet Take 1,000 mg by mouth 2 times daily (with meals)       nitroglycerin (NITROSTAT) 0.4 MG sublingual tablet Place  1 tablet (0.4 mg) under the tongue See Admin Instructions for chest pain 25 tablet 3     ONETOUCH DELICA LANCETS 33G MISC 1 Device daily 100 each prn     OVER-THE-COUNTER Beplex Forte 260  mcg every OTHER day       RaNITidine HCl (ZANTAC 150 MAXIMUM STRENGTH PO) Take 150 mg by mouth daily       rosuvastatin (CRESTOR) 20 MG tablet Take 1 tablet (20 mg) by mouth daily 90 tablet 2     salicylic acid (MEDIPLAST) 40 % MISC Apply 1 dose. topically At Bedtime Each night, Scrape or loofa the area and then soak foot for 10-15 min in warm water.  Cut plaster to fit exactly over 1 wart each.  Tape each in place for overnight and remove the next morning. 1 each 11     STUDY MEDICATION Metformin 1000 mg bid  Glimepiride 1 mg daily    Medication provided by [facility] study only.   Coordinator: Raymundo Coy -[id number]  PI: Shanon Guy -[id number]       [DISCONTINUED] amLODIPine (NORVASC) 10 MG tablet Take 1 tablet (10 mg) by mouth daily 90 tablet 2     [DISCONTINUED] carvedilol (COREG) 6.25 MG tablet Take one tablet in PM, half tablet in  tablet 2     [DISCONTINUED] chlorthalidone (HYGROTON) 25 MG tablet Take 0.5 tablets (12.5 mg) by mouth daily 90 tablet 11     No facility-administered encounter medications on file as of 7/9/2018.        Again, thank you for allowing me to participate in the care of your patient.      Sincerely,    Grant Morris MD     [facility]

## 2018-07-09 NOTE — PROGRESS NOTES
Service Date: 07/09/2018      PRIMARY CARE AND REFERRING PROVIDER:  Hai Mckee MD      PRIMARY CARDIOLOGY TEAM:  Mars Vasquez MD, LIZA Reynoso, CNP      REASON FOR VISIT:   1.  Chest pain.   2.  Renal artery stenosis.   3.  Hypertension.      HISTORY OF PRESENT ILLNESS:    The patient is new to me, but he has established cardiology care with Dr. Vaqsuez and Vikki Khan.  I am seeing him due to limited availability with Dr. Vasquez.  The patient is a 67-year-old gentleman of  ([place] ) ancestry and was accompanied by his wife and adult son.  He is known to have a stable CAD, status post CABG in 2009 (LIMA to LAD, vein graft to diagonal, OM2 and a separate vein graft to the right coronary posterolateral branch), normal left ventricle systolic function, a left middle cerebral artery embolus stroke postoperatively after his bypass surgery (no motor deficit, but has trouble with word finding), type 2 diabetes mellitus with excellent control on oral medications (HbA1c 6.3%), historically lately suboptimally controlled systolic hypertension, BMI 28.3 kg/m2, never tobacco user.      1.  Chest pain:    A few days ago, the patient was in the car with his wife when he had 3 or 4 recurrent episodes of twinges of chest pain that responded to 3 doses of sublingual nitroglycerin.  He says this is reminiscent of his historical angina.  He gets angina very infrequently since the CABG in 2009.  He has not had any since that episode.  His ECG done today shows marked sinus bradycardia of 44 BPM with first-degree AV block (MS interval 236 milliseconds) with a normal QTc of 434 milliseconds.  I note that he is on 9.75 mg of carvedilol daily.  However, only yesterday he walked 4 miles without any angina or symptoms of chronotropic incompetence such as fatigue.  He finds the treadmill challenging due to knee pain.     2.  Hypertension.   The patient has had hypertension for many years.  Historically,  his systolic blood pressure has been more challenging over the last few months.  Today it was 153/75.     He is currently on: amlodipine 10 mg in the evening, chlorthalidone 12.5 mg daily, carvedilol 6.25 mg a.m., 3.125 mg p.m., eplerenone 25 mg daily, losartan 100 mg daily.        Dr. Mckee obtained a renal ultrasound to evaluate his persistent systolic hypertension and it showed elevated velocities at the origin and mid right renal artery suggesting renal artery stenosis but no left renal artery stenosis.  Dr. Mckee has referred him to see Dr. Warner and the patient is awaiting an appointment.      However, he does have issues of lower extremity edema with the amlodipine and is significantly hyponatremic with serial sodiums of 128 and 125 with a normal potassium of 4.0 and creatinine of 0.84, BUN 12.  He describes recent salt craving, but overall his dietary sodium intake is normal.      CURRENT MEDICATIONS:   1.  Amlodipine 10 mg at bedtime.   2.  Aspirin 81 mg daily.   3.  Carvedilol 6.125 mg morning and 3.125 mg p.m.   4.  Clopidogrel 75 mg daily.   5.  Eplerenone 25 mg daily.   6.  Glimepiride 0.5 mg daily.   7.  Fluticasone nasal spray   8.  Losartan 100 mg daily.   9.  Metformin 1000 mg b.i.d.   10.  Sublingual nitroglycerin.   11.  Ranitidine 150 mg daily.   12.  Rosuvastatin 20 mg daily.      PHYSICAL EXAMINATION:   VITAL SIGNS:  /75 mmHg.  Pulse 44 per minute, sinus bradycardia.  Height 1.613 meters (5 feet 5 inches, weight 60 kg (131 pounds) sats 98% on room air, respiratory rate 14 per minute, BMI 23 kg/m2.   CONSTITUTIONAL:  Slim body habitus, comfortable at rest, has apparent dysarthria from his previous stroke, average developed and nourished, cooperative, alert, oriented.   EYES:  No pallor or icterus.   ENT:  No cyanosis or pallor.   NECK:  Normal JVP.  Normal carotid pulses.  No bruit.  No thyromegaly.   RESPIRATORY:  Clear to auscultation.   CARDIOVASCULAR:  Normal apical impulse.  Heart sounds  are regular.  No audible murmur, S3 or S4.   ABDOMEN:  Soft, nontender.   SKIN:  No rash, erythema.   EXTREMITIES:  No edema.      DIAGNOSES:   1.  Chest pain, unspecified.   2.  Status post CABG x3 in 2009 (LIMA to LAD, sequential vein graft to diagonal and OM2, vein graft to right PLV branch).   3.  History of post-CABG left MCA embolic stroke with residual dysarthria.     4.  Right renal artery stenosis.   5.  Resistant hypertension.   6.  Hyponatremia.   7.  Significant sinus bradycardia secondary to medication effect.   8.  Type 2 diabetes mellitus without complication without long-term use of insulin.      ASSESSMENT AND PLAN:   1.  Chest pain.  It sounds atypical, but patient reports that his pre-CABG symptoms were also atypical, so we will proceed with a cardiac MRI stress in 3 weeks (after his blood pressure has been optimally controlled).     2.  Resistant hypertension with question of right renal artery stenosis.  It is likely that he has true renal artery stenosis given his resistant hypertension.  I recommend the following changes.   -- Stop chlorthalidone due to hyponatremia.   -- Decrease carvedilol to 3.125 mg b.i.d due to resting bradycardia.   -- Split amlodipine into 5 mg b.i.d.   -- Continue losartan 100 mg daily and eplerenone 25 mg daily.   -- 24-hour Holter at the lower dose of carvedilol.   -- Nurse visit week to check blood pressure.   -- Cardiac MRI stress.     3. Refer to Dr. Warner in 6-8 weeks for additional evaluation of renal artery stenosis.  I suspect he will require a CTA prior to that.     4. Followup in my clinic in 2 weeks with previsit labs.      It was my pleasure to visit with Mr. Pulliam.  I spent 45 minutes with him, greater than 50% of the time was spent in counseling and coordination of care.      cc:   Mars Vasquez MD   [facility]   [address], [id number]   [place] [address]      Hai Mckee MD    [facility]   [address]   [place] [address]         [facility] FAIZAN GUALLPA MD             D: 2018   T: 2018   MT: LARRY      Name:     CORINE BILLY   MRN:      [id number]        Account:      [id number]   :      1950           Service Date: 2018      Document: [id number]

## 2018-07-09 NOTE — MR AVS SNAPSHOT
After Visit Summary   2018    Mati Pulliam    MRN: [id number]           Patient Information     Date Of Birth          1950        Visit Information        Provider Department      2018 1:15 PM Garnt Morris MD [facility]        Today's Diagnoses     Chest pain, unspecified type    -  1    Status post aorto-coronary artery bypass graft        Renovascular hypertension        Type 2 diabetes mellitus without complication, without long-term current use of insulin (H)        History of stroke          Care Instructions    MEDICATION CHANGES:  1. Stop chlorthalidone.  2. Split amlodipine into 5 mg two times daily (instead of 10 mg daily).   3. Continue losartan 100 mg daily, eplerenone 25 mg daily.  4. Decrease carvedilol to 3.125 mg two times daily.    FOLLOW UP:  1. In my clinic in 7-10 days. Previsit blood tests (comprehensive metabolic panel, TSH with reflex free T4).  2. Appointment with Dr. Gardner will be scheduled in 6 weeks.    TESTIN. 24-hour heart rhythm monitor (Holter) in three days.  2. Cardiac MRI with stress in 3 weeks.    If you have any questions or concerns, please call my nurse Leah Stephens at [phone].            Follow-ups after your visit        Additional Services     Follow-Up with Cardiologist           Follow-Up with Cardiologist                 Your next 10 appointments already scheduled     2018  2:30 PM CDT   Holter Monitor with NALINI   [facility] - [facility] ([facility])    [address]  [facility] [address]   [phone]            Oct 19, 2018  3:15 PM CDT   Return Visit with Cuate Warner MD   [facility] ([facility])    [address]  [facility] [address] [phone] OPT 2            Dec 13, 2018 11:00 AM CST   Office Visit with Hai Mckee MD   [facility]  ([facility])    [address] [facility] [id number] [phone]           Bring a current list of meds and any records pertaining to this visit. For Physicals, please bring immunization records and any forms needing to be filled out. Please arrive 10 minutes early to complete paperwork.              Future tests that were ordered for you today     Open Future Orders        Priority Expected Expires Ordered    Follow-Up with Cardiologist Routine 8/20/2018 7/9/2019 7/9/2018    MRI Cardiac w/contrast and stress Routine 7/30/2018 7/9/2019 7/9/2018    Follow-Up with Cardiologist Routine 7/23/2018 7/9/2019 7/9/2018    Holter Monitor 24 hour - Adult Routine 7/12/2018 8/23/2018 7/9/2018    Comprehensive metabolic panel Routine 7/16/2018 7/9/2019 7/9/2018    TSH with free T4 reflex Routine 7/16/2018 7/9/2019 7/9/2018            Who to contact     If you have questions or need follow up information about today's clinic visit or your schedule please contact [facility] directly at [phone].  Normal or non-critical lab and imaging results will be communicated to you by MyChart, letter or phone within 4 business days after the clinic has received the results. If you do not hear from us within 7 days, please contact the clinic through [employer] or phone. If you have a critical or abnormal lab result, we will notify you by phone as soon as possible.  Submit refill requests through [employer] or call your pharmacy and they will forward the refill request to us. Please allow 3 business days for your refill to be completed.          Additional Information About Your Visit        [employer] Information     [employer] gives you secure access to your electronic health record. If you see a primary care provider, you can also send messages to your care team and make appointments. If you have questions, please call your primary care clinic.  If you do not have a primary care provider,  "please call [phone] and they will assist you.        Care EveryWhere ID     This is your Care EveryWhere ID. This could be used by other organizations to access your [place] medical records  [id number]        Your Vitals Were     Pulse Height Pulse Oximetry BMI (Body Mass Index)          48 1.613 m (5' 3.5\") 98% 22.91 kg/m2         Blood Pressure from Last 3 Encounters:   07/09/18 153/75   07/05/18 138/76   07/03/18 122/72    Weight from Last 3 Encounters:   07/09/18 59.6 kg (131 lb 6.4 oz)   06/28/18 58.5 kg (129 lb)   06/14/18 58.5 kg (129 lb)              We Performed the Following     EKG 12-lead complete w/read - Clinics          Today's Medication Changes          These changes are accurate as of 7/9/18  2:41 PM.  If you have any questions, ask your nurse or doctor.               These medicines have changed or have updated prescriptions.        Dose/Directions    amLODIPine 5 MG tablet   Commonly known as:  NORVASC   This may have changed:    - medication strength  - how much to take  - when to take this   Changed by:  Grant Morris MD        Dose:  5 mg   Take 1 tablet (5 mg) by mouth 2 times daily   Quantity:  60 tablet   Refills:  3       carvedilol 3.125 MG tablet   Commonly known as:  COREG   This may have changed:    - medication strength  - how much to take  - how to take this  - when to take this  - additional instructions   Changed by:  Grant Morris MD        Dose:  3.125 mg   Take 1 tablet (3.125 mg) by mouth 2 times daily (with meals)   Quantity:  30 tablet   Refills:  3       fluticasone 50 MCG/ACT spray   Commonly known as:  FLONASE   This may have changed:  additional instructions   Used for:  Cough        Dose:  1-2 spray   Spray 1-2 sprays into both nostrils daily   Quantity:  1 Package   Refills:  11         Stop taking these medicines if you haven't already. Please contact your care team if you have questions.     chlorthalidone 25 MG tablet   Commonly known as:  " HYGROTON   Stopped by:  Grant Morris MD                Where to get your medicines      These medications were sent to [id number] - [place], [place] - [address]  [address], [place] [address]     Phone:  [phone]     amLODIPine 5 MG tablet    carvedilol 3.125 MG tablet                Primary Care Provider Office Phone # Fax #    Bhmeghashania Mckee [phone] [address] [facility] [address]  [facility] [address]        Equal Access to Services     [facility]: Hadii aad ku hadasho Soomaali, waaxda luqadaha, qaybta kaalmada adeegyada, lluvia guidry . So [facility] [phone].    ATENCIÓN: Si habla español, tiene a hamm disposición servicios gratuitos de asistencia lingüística. [facility] [phone].    We comply with applicable federal civil rights laws and [place] laws. We do not discriminate on the basis of race, color, national origin, age, disability, sex, sexual orientation, or gender identity.            Thank you!     Thank you for choosing [facility]  for your care. Our goal is always to provide you with excellent care. Hearing back from our patients is one way we can continue to improve our services. Please take a few minutes to complete the written survey that you may receive in the mail after your visit with us. Thank you!             Your Updated Medication List - Protect others around you: Learn how to safely use, store and throw away your medicines at www.disposemymeds.org.          This list is accurate as of 7/9/18  2:41 PM.  Always use your most recent med list.                   Brand Name Dispense Instructions for use Diagnosis    amLODIPine 5 MG tablet    NORVASC    60 tablet    Take 1 tablet (5 mg) by mouth 2 times daily        aspirin 81 MG tablet     100    ONE DAILY    Type II or unspecified type diabetes mellitus without mention of complication, not stated as uncontrolled        blood glucose monitoring meter device kit     1 kit    Use to test blood sugar once daily or as directed.    Type 2 diabetes, HbA1C goal < 8% (H)       blood glucose monitoring test strip    ONETOUCH ULTRA    100 strip    Use to test blood sugars once daily as directed.    Type 2 diabetes, HbA1C goal < 8% (H)       carvedilol 3.125 MG tablet    COREG    30 tablet    Take 1 tablet (3.125 mg) by mouth 2 times daily (with meals)        clopidogrel 75 MG tablet    PLAVIX    90 tablet    Take 1 tablet (75 mg) by mouth daily    History of stroke, S/P CABG (coronary artery bypass graft)       eplerenone 25 MG tablet    INSPRA    90 tablet    Take 1 tablet (25 mg) by mouth daily    Benign essential hypertension       fluticasone 50 MCG/ACT spray    FLONASE    1 Package    Spray 1-2 sprays into both nostrils daily    Cough       glimepiride 1 MG tablet    AMARYL     Take 0.5 mg by mouth every morning (before breakfast)        losartan 100 MG tablet    COZAAR    90 tablet    Take 1 tablet (100 mg) by mouth daily    Benign essential hypertension       metFORMIN 500 MG tablet    GLUCOPHAGE     Take 1,000 mg by mouth 2 times daily (with meals)        nitroGLYcerin 0.4 MG sublingual tablet    NITROSTAT    25 tablet    Place 1 tablet (0.4 mg) under the tongue See Admin Instructions for chest pain    Chronic ischemic heart disease       ONETOUCH DELICA LANCETS 33G Misc     100 each    1 Device daily    Type 2 diabetes, HbA1C goal < 8% (H)       OVER-THE-COUNTER      Beplex Forte 260  mcg every OTHER day        rosuvastatin 20 MG tablet    CRESTOR    90 tablet    Take 1 tablet (20 mg) by mouth daily    Hyperlipidemia LDL goal <70       salicylic acid 40 % Misc    MEDIPLAST    1 each    Apply 1 dose. topically At Bedtime Each night, Scrape or loofa the area and then soak foot for 10-15 min in warm water.  Cut plaster to fit exactly over 1 wart each.  Tape each in place for overnight and remove the next morning.    Callus of  foot       STUDY MEDICATION      Metformin 1000 mg bid Glimepiride 1 mg daily  Medication provided by GRADE study only.  Coordinator: Raymundo Coy -[id number] PI: Shanon Guy -[phone]        vitamin D 2000 units Caps      Take 1 capsule by mouth every other day        ZANTAC 150 MAXIMUM STRENGTH PO      Take 150 mg by mouth daily

## 2018-07-10 ENCOUNTER — CARE COORDINATION (OUTPATIENT)
Dept: CARDIOLOGY | Facility: CLINIC | Age: 68
End: 2018-07-10
Payer: COMMERCIAL

## 2018-07-10 DIAGNOSIS — I25.10 CAD (CORONARY ARTERY DISEASE): Primary | ICD-10-CM

## 2018-07-10 PROCEDURE — 99207 C NO CHARGE NURSE ONLY: CPT

## 2018-07-11 ENCOUNTER — HOSPITAL ENCOUNTER (OUTPATIENT)
Dept: CARDIOLOGY | Facility: CLINIC | Age: 68
Discharge: HOME | End: 2018-07-11
Attending: INTERNAL MEDICINE | Admitting: INTERNAL MEDICINE
Payer: MEDICARE

## 2018-07-11 DIAGNOSIS — Z95.1 STATUS POST AORTO-CORONARY ARTERY BYPASS GRAFT: ICD-10-CM

## 2018-07-11 DIAGNOSIS — Z86.73 HISTORY OF STROKE: ICD-10-CM

## 2018-07-11 DIAGNOSIS — E11.9 TYPE 2 DIABETES MELLITUS WITHOUT COMPLICATION, WITHOUT LONG-TERM CURRENT USE OF INSULIN (H): ICD-10-CM

## 2018-07-11 DIAGNOSIS — R07.9 CHEST PAIN, UNSPECIFIED TYPE: ICD-10-CM

## 2018-07-11 DIAGNOSIS — I15.0 RENOVASCULAR HYPERTENSION: ICD-10-CM

## 2018-07-11 PROCEDURE — 93226 XTRNL ECG REC<48 HR SCAN A/R: CPT

## 2018-07-11 PROCEDURE — 93227 XTRNL ECG REC<48 HR R&I: CPT | Performed by: INTERNAL MEDICINE

## 2018-07-13 LAB — INTERPRETATION MONITOR -MUSE: NORMAL

## 2018-07-27 DIAGNOSIS — I10 HTN (HYPERTENSION): Primary | ICD-10-CM

## 2018-07-31 ENCOUNTER — ALLIED HEALTH/NURSE VISIT (OUTPATIENT)
Dept: CARDIOLOGY | Facility: CLINIC | Age: 68
End: 2018-07-31
Attending: INTERNAL MEDICINE
Payer: COMMERCIAL

## 2018-07-31 ENCOUNTER — CARE COORDINATION (OUTPATIENT)
Dept: CARDIOLOGY | Facility: CLINIC | Age: 68
End: 2018-07-31

## 2018-07-31 VITALS — SYSTOLIC BLOOD PRESSURE: 163 MMHG | DIASTOLIC BLOOD PRESSURE: 75 MMHG | HEART RATE: 53 BPM

## 2018-07-31 DIAGNOSIS — I10 HTN (HYPERTENSION): ICD-10-CM

## 2018-07-31 PROCEDURE — 99207 ZZC NO CHARGE LOS: CPT

## 2018-07-31 NOTE — PROGRESS NOTES
Last office visit: 18    Previous blood pressure: 153/79     Mm Hg     Previous heart rate:  48    bpm    Time of readin:52 pm    Morning medications were taken at: 8:00 am    Today's blood pressure: 163/75  mm Hg    Recheck :151/72       Today's heart rate:  53   bpm                       Recheck: 55bpm    Ordering Provider: Dr. Morris    Results sent to team # : Leah DOMINGUEZ     Additional comments:

## 2018-07-31 NOTE — MR AVS SNAPSHOT
After Visit Summary   7/31/2018    Mati Pulliam    MRN: [id number]           Patient Information     Date Of Birth          1950        Visit Information        Provider Department      7/31/2018 3:00 PM [facility]        Today's Diagnoses     HTN (hypertension)           Follow-ups after your visit        Your next 10 appointments already scheduled     Aug 09, 2018  1:40 PM CDT   LAB with [facility]   [facility] AT [place] ([facility])    [address]  [facility] [address]   [phone]           Please do not eat 10-12 hours before your appointment if you are coming in fasting for labs on lipids, cholesterol, or glucose (sugar). This does not apply to pregnant women. Water, hot tea and black coffee (with nothing added) are okay. Do not drink other fluids, diet soda or chew gum.            Aug 09, 2018  2:30 PM CDT   MR CARDIAC W CONTRAST AND STRESS with SCIMR1   [facility] (Cardiovascular Imaging at [facility])    [address]  [address]  [facility] [address]   [phone]           Take your medicines as usual, unless your doctor tells you not to.   If you take Viagra, Levitra or Cialis, stop taking it 48 hours before your test.   If you take Aggrenox or dipyridamole (Persantine, Permole), stop taking it 48 hours before your test.   If you take Theophylline or Aminophylline, stop taking it 12 hours before your test.   If you are diabetic and take oral hypoglycemics, do not take them on the day of your test.  The day before your exam, drink extra fluids at least six 8-ounce glasses (unless your doctor wants you to limit your fluids).  Stop all caffeine 12 hours before the test. This includes coffee, tea, soda, chocolate and certain medicines (such as Anacin, Excedrin and NoDoz). Also avoid decaf coffee and tea, as these contain  small amounts of caffeine.  Do not eat or drink for 3 hours before your exam. You may drink water and take your morning medicines.  You may need a blood test (creatinine test) within 30 days of your exam. Follow your doctor s orders if this is arranged before your exam.  If you are very claustrophobic, please let you doctor know. You may get a sedative medicine from your doctor before you arrive. Bring the medicine to the exam. Do not take it at home. Arrive one hour early. Bring someone who can take you home after the test. Your medicine will make you sleepy. After the exam, you may not drive, take a bus or take a taxi by yourself.  The MRI machine uses a strong magnet. Please wear clothes without metal (snaps, zippers). A sweatsuit works well, or we may give you a hospital gown.  Please remove any body piercings and hair extensions before you arrive. You will remove watches, jewelry, hairpins, wallets, dentures, partial dental plates and hearing aids. You may wear contact lenses, and you may be able to wear your rings. We have a safe place to keep your personal items, but it is safer to leave them at home.  **IMPORTANT** THE INSTRUCTIONS BELOW ARE ONLY FOR THOSE PATIENTS WHO HAVE BEEN PRESCRIBED SEDATION OR GENERAL ANESTHESIA DURING THEIR MRI PROCEDURE:  IF YOUR DOCTOR PRESCRIBED ORAL SEDATION (take medicine to help you relax during your exam):   You must get the medicine from your doctor (oral medication) before you arrive. Bring the medicine to the exam. Do not take it at home. You ll be told when to take it upon arriving for your exam.   Arrive one hour early. Bring someone who can take you home after the test. Your medicine will make you sleepy. After the exam, you may not drive, take a bus or take a taxi by yourself.  IF YOUR DOCTOR PRESCRIBED IV SEDATION:   Arrive one hour early. Bring someone who can take you home after the test. Your medicine will make you sleepy. After the exam, you may not drive, take a  bus or take a taxi by yourself.   No eating 6 hours before your exam. You may have clear liquids up until 4 hours before your exam. (Clear liquids include water, clear tea, black coffee and fruit juice without pulp.)  IF YOUR DOCTOR PRESCRIBED ANESTHESIA (be asleep for your exam):   Arrive 1 1/2 hours early. Bring someone who can take you home after the test. You may not drive, take a bus or take a taxi by yourself.   No eating 8 hours before your exam. You may have clear liquids up until 4 hours before your exam. (Clear liquids include water, clear tea, black coffee and fruit juice without pulp.)   You will spend four to five hours in the recovery room.  If you have any questions, please contact your Imaging Department exam site.            Aug 13, 2018  3:45 PM CDT   Return Visit with Grant Morris MD   [facility] ([facility])    [id number]  [facility] [address] [phone] OPT 2            Oct 19, 2018  3:15 PM CDT   Return Visit with Cuate Warner MD   [facility] ([facility])    [id number]  [facility] [address] [phone] OPT 2            Dec 13, 2018 11:00 AM CST   Office Visit with Hai Mckee MD   [facility] ([facility])    [address] [facility] [address] [phone]           Bring a current list of meds and any records pertaining to this visit. For Physicals, please bring immunization records and any forms needing to be filled out. Please arrive 10 minutes early to complete paperwork.              Who to contact     If you have questions or need follow up information about today's clinic visit or your schedule please contact [facility] directly at [phone].  Normal or non-critical lab and imaging results will be communicated to you by MyChart, letter or phone within 4  business days after the clinic has received the results. If you do not hear from us within 7 days, please contact the clinic through [employer] or phone. If you have a critical or abnormal lab result, we will notify you by phone as soon as possible.  Submit refill requests through [employer] or call your pharmacy and they will forward the refill request to us. Please allow 3 business days for your refill to be completed.          Additional Information About Your Visit        [employer] Information     [employer] gives you secure access to your electronic health record. If you see a primary care provider, you can also send messages to your care team and make appointments. If you have questions, please call your primary care clinic.  If you do not have a primary care provider, please call [phone] and they will assist you.        Care EveryWhere ID     This is your Care EveryWhere ID. This could be used by other organizations to access your [facility] medical records  [id number]        Your Vitals Were     Pulse                   53            Blood Pressure from Last 3 Encounters:   07/31/18 163/75   07/09/18 153/75   07/05/18 138/76    Weight from Last 3 Encounters:   07/09/18 59.6 kg (131 lb 6.4 oz)   06/28/18 58.5 kg (129 lb)   06/14/18 58.5 kg (129 lb)              We Performed the Following     Follow-Up with Nurse          Today's Medication Changes          These changes are accurate as of 7/31/18  3:04 PM.  If you have any questions, ask your nurse or doctor.               These medicines have changed or have updated prescriptions.        Dose/Directions    fluticasone 50 MCG/ACT spray   Commonly known as:  FLONASE   This may have changed:  additional instructions   Used for:  Cough        Dose:  1-2 spray   Spray 1-2 sprays into both nostrils daily   Quantity:  1 Package   Refills:  11                Primary Care Provider Office Phone # Fax #    Bhkvng Mckee [phone] [address]  [facility] [address]        Equal Access to Services     SAVANAH WALKER : Hadii desiree ruano kammariaa Sobarbara, waaxda luqadaha, qaybta kaalmalluvia andrewsearlerina galvin. So [facility] [phone].    ATENCIÓN: Si habla español, tiene a hamm disposición servicios gratuitos de asistencia lingüística. Isacame al [phone].    We comply with applicable federal civil rights laws and [place] laws. We do not discriminate on the basis of race, color, national origin, age, disability, sex, sexual orientation, or gender identity.            Thank you!     Thank you for choosing [facility]  for your care. Our goal is always to provide you with excellent care. Hearing back from our patients is one way we can continue to improve our services. Please take a few minutes to complete the written survey that you may receive in the mail after your visit with us. Thank you!             Your Updated Medication List - Protect others around you: Learn how to safely use, store and throw away your medicines at www.disposemymeds.org.          This list is accurate as of 7/31/18  3:04 PM.  Always use your most recent med list.                   Brand Name Dispense Instructions for use Diagnosis    amLODIPine 5 MG tablet    NORVASC    60 tablet    Take 1 tablet (5 mg) by mouth 2 times daily        aspirin 81 MG tablet     100    ONE DAILY    Type II or unspecified type diabetes mellitus without mention of complication, not stated as uncontrolled       blood glucose monitoring meter device kit     1 kit    Use to test blood sugar once daily or as directed.    Type 2 diabetes, HbA1C goal < 8% (H)       blood glucose monitoring test strip    ONETOUCH ULTRA    100 strip    Use to test blood sugars once daily as directed.    Type 2 diabetes, HbA1C goal < 8% (H)       carvedilol 3.125 MG tablet    COREG    30 tablet    Take 1 tablet (3.125 mg) by mouth 2 times daily (with meals)         clopidogrel 75 MG tablet    PLAVIX    90 tablet    Take 1 tablet (75 mg) by mouth daily    History of stroke, S/P CABG (coronary artery bypass graft)       eplerenone 25 MG tablet    INSPRA    90 tablet    Take 1 tablet (25 mg) by mouth daily    Benign essential hypertension       fluticasone 50 MCG/ACT spray    FLONASE    1 Package    Spray 1-2 sprays into both nostrils daily    Cough       glimepiride 1 MG tablet    AMARYL     Take 0.5 mg by mouth every morning (before breakfast)        losartan 100 MG tablet    COZAAR    90 tablet    Take 1 tablet (100 mg) by mouth daily    Benign essential hypertension       metFORMIN 500 MG tablet    GLUCOPHAGE     Take 1,000 mg by mouth 2 times daily (with meals)        nitroGLYcerin 0.4 MG sublingual tablet    NITROSTAT    25 tablet    Place 1 tablet (0.4 mg) under the tongue See Admin Instructions for chest pain    Chronic ischemic heart disease       ONETOUCH DELICA LANCETS 33G Misc     100 each    1 Device daily    Type 2 diabetes, HbA1C goal < 8% (H)       OVER-THE-COUNTER      Beplex Forte 260  mcg every OTHER day        rosuvastatin 20 MG tablet    CRESTOR    90 tablet    Take 1 tablet (20 mg) by mouth daily    Hyperlipidemia LDL goal <70       salicylic acid 40 % Misc    MEDIPLAST    1 each    Apply 1 dose. topically At Bedtime Each night, Scrape or loofa the area and then soak foot for 10-15 min in warm water.  Cut plaster to fit exactly over 1 wart each.  Tape each in place for overnight and remove the next morning.    Callus of foot       STUDY MEDICATION      Metformin 1000 mg bid Glimepiride 1 mg daily  Medication provided by GRADE study only.  Coordinator: Raymundo Coy -[phone] PI: Shanon Guy -[phone]        vitamin D 2000 units Caps      Take 1 capsule by mouth every other day        ZANTAC 150 MAXIMUM STRENGTH PO      Take 150 mg by mouth daily

## 2018-07-31 NOTE — PROGRESS NOTES
Blood pressure check today sent to Dr Morris for review.        Post visit with DR Morris on 7/9/2018 w/plan:  .  Resistant hypertension with question of right renal artery stenosis.  It is likely that he has true renal artery stenosis given how many medications he is requiring.  However, his current antihypertensive regimen needs to be altered due to the presence of significant hyponatremia and bradycardia.  I recommend the following changes.   -- Stop chlorthalidone.   -- Decrease carvedilol to 3.125 mg b.i.d.   -- Split amlodipine into 5 mg b.i.d.   -- Continue losartan 100 mg daily and eplerenone 25 mg daily.   -- 24-hour Holter at the lower dose of carvedilol.   -- Nurse visit week to check blood pressure.   -- Followup in my clinic in 2 weeks with previsit labs.   -- Cardiac MRI stress.   -- Refer to Dr. Warner in 6-8 weeks for additional evaluation of renal artery stenosis.  I suspect he will require a CTA prior to that.     Leah King RN 07/31/18 3:32 PM     Please have the patient start doxazosin SR 2 mg, one tablet two times daily from tomorrow. Thank you  Dr. Arturo MD     Called to patient = left message to call back to review DR Morris recommendation.  Leah King RN 08/02/18 2:43 PM    8/6/2018 Writer attempted to contact patient on return to office today - reviewed DR Morris plan to change medications as outlined in note above.  Patient agrees to addition of Doxazosin SR (cardura) 2 mg twice daily and will call with questions or concerns.  Reviewed rationale and drug profile with patient - patient states understanding and agreement to DR Morris's plan. Visit with Dr Morris as scheduled 8/13/2018.  Prior to placing Rx in system, reviewed with DR Higgins and ok to prescribe 4 mg tablet time released tablet and cut in half to take twice daily, patient to monitor closely for dizziness or syncope post starting drug, confirm with pharmacy ok to cut time released tablet. \  Called to pharmacist at [facility]  [place] - timed released cardura is non-generic only and cannot be cut in half.   Called to patient with update on instructions and that will call him back once drug dosing confirmed - patient agrees to watching for dizziness or syncope once drug started, stop drug if these occur and call immediately or contact 911 as needed.   Messaged to DR Higgins to review in Dr Morris's absence.  Leah King RN 08/06/18 10:02 AM

## 2018-08-07 ENCOUNTER — MYC MEDICAL ADVICE (OUTPATIENT)
Dept: CARDIOLOGY | Facility: CLINIC | Age: 68
End: 2018-08-07

## 2018-08-09 ENCOUNTER — HOSPITAL ENCOUNTER (OUTPATIENT)
Dept: CARDIOLOGY | Facility: CLINIC | Age: 68
Discharge: HOME | End: 2018-08-09
Attending: INTERNAL MEDICINE | Admitting: INTERNAL MEDICINE
Payer: MEDICARE

## 2018-08-09 VITALS — DIASTOLIC BLOOD PRESSURE: 64 MMHG | OXYGEN SATURATION: 100 % | HEART RATE: 58 BPM | SYSTOLIC BLOOD PRESSURE: 137 MMHG

## 2018-08-09 DIAGNOSIS — I15.0 RENOVASCULAR HYPERTENSION: ICD-10-CM

## 2018-08-09 DIAGNOSIS — E11.9 TYPE 2 DIABETES MELLITUS WITHOUT COMPLICATION, WITHOUT LONG-TERM CURRENT USE OF INSULIN (H): ICD-10-CM

## 2018-08-09 DIAGNOSIS — Z95.1 STATUS POST AORTO-CORONARY ARTERY BYPASS GRAFT: ICD-10-CM

## 2018-08-09 DIAGNOSIS — R07.9 CHEST PAIN, UNSPECIFIED TYPE: ICD-10-CM

## 2018-08-09 DIAGNOSIS — Z86.73 HISTORY OF STROKE: ICD-10-CM

## 2018-08-09 LAB
ALBUMIN SERPL-MCNC: 4 G/DL (ref 3.4–5)
ALP SERPL-CCNC: 62 U/L (ref 40–150)
ALT SERPL W P-5'-P-CCNC: 24 U/L (ref 0–70)
ANION GAP SERPL CALCULATED.3IONS-SCNC: 8 MMOL/L (ref 3–14)
AST SERPL W P-5'-P-CCNC: 17 U/L (ref 0–45)
BILIRUB SERPL-MCNC: 1.2 MG/DL (ref 0.2–1.3)
BUN SERPL-MCNC: 13 MG/DL (ref 7–30)
CALCIUM SERPL-MCNC: 9 MG/DL (ref 8.5–10.1)
CHLORIDE SERPL-SCNC: 100 MMOL/L (ref 94–109)
CO2 SERPL-SCNC: 27 MMOL/L (ref 20–32)
CREAT SERPL-MCNC: 0.85 MG/DL (ref 0.66–1.25)
GFR SERPL CREATININE-BSD FRML MDRD: >90 ML/MIN/1.7M2
GLUCOSE SERPL-MCNC: 95 MG/DL (ref 70–99)
POTASSIUM SERPL-SCNC: 4.1 MMOL/L (ref 3.4–5.3)
PROT SERPL-MCNC: 7.6 G/DL (ref 6.8–8.8)
SODIUM SERPL-SCNC: 135 MMOL/L (ref 133–144)
TSH SERPL DL<=0.005 MIU/L-ACNC: 1.35 MU/L (ref 0.4–4)

## 2018-08-09 PROCEDURE — 25000128 H RX IP 250 OP 636: Performed by: INTERNAL MEDICINE

## 2018-08-09 PROCEDURE — 93016 CV STRESS TEST SUPVJ ONLY: CPT | Performed by: INTERNAL MEDICINE

## 2018-08-09 PROCEDURE — 75563 CARD MRI W/STRESS IMG & DYE: CPT | Mod: 26 | Performed by: INTERNAL MEDICINE

## 2018-08-09 PROCEDURE — 75563 CARD MRI W/STRESS IMG & DYE: CPT

## 2018-08-09 PROCEDURE — 36415 COLL VENOUS BLD VENIPUNCTURE: CPT | Performed by: INTERNAL MEDICINE

## 2018-08-09 PROCEDURE — 84443 ASSAY THYROID STIM HORMONE: CPT | Performed by: INTERNAL MEDICINE

## 2018-08-09 PROCEDURE — 93018 CV STRESS TEST I&R ONLY: CPT | Performed by: INTERNAL MEDICINE

## 2018-08-09 PROCEDURE — 80053 COMPREHEN METABOLIC PANEL: CPT | Performed by: INTERNAL MEDICINE

## 2018-08-09 PROCEDURE — A9585 GADOBUTROL INJECTION: HCPCS | Performed by: INTERNAL MEDICINE

## 2018-08-09 RX ORDER — DIPHENHYDRAMINE HYDROCHLORIDE 50 MG/ML
25-50 INJECTION INTRAMUSCULAR; INTRAVENOUS
Status: DISCONTINUED | OUTPATIENT
Start: 2018-08-09 | End: 2018-08-10 | Stop reason: HOSPADM

## 2018-08-09 RX ORDER — METHYLPREDNISOLONE SODIUM SUCCINATE 125 MG/2ML
125 INJECTION, POWDER, LYOPHILIZED, FOR SOLUTION INTRAMUSCULAR; INTRAVENOUS
Status: DISCONTINUED | OUTPATIENT
Start: 2018-08-09 | End: 2018-08-10 | Stop reason: HOSPADM

## 2018-08-09 RX ORDER — ONDANSETRON 2 MG/ML
4 INJECTION INTRAMUSCULAR; INTRAVENOUS
Status: DISCONTINUED | OUTPATIENT
Start: 2018-08-09 | End: 2018-08-10 | Stop reason: HOSPADM

## 2018-08-09 RX ORDER — DIAZEPAM 5 MG
5 TABLET ORAL EVERY 30 MIN PRN
Status: DISCONTINUED | OUTPATIENT
Start: 2018-08-09 | End: 2018-08-10 | Stop reason: HOSPADM

## 2018-08-09 RX ORDER — ALBUTEROL SULFATE 90 UG/1
2 AEROSOL, METERED RESPIRATORY (INHALATION) EVERY 5 MIN PRN
Status: DISCONTINUED | OUTPATIENT
Start: 2018-08-09 | End: 2018-08-10 | Stop reason: HOSPADM

## 2018-08-09 RX ORDER — GADOBUTROL 604.72 MG/ML
5-65 INJECTION INTRAVENOUS ONCE
Status: COMPLETED | OUTPATIENT
Start: 2018-08-09 | End: 2018-08-09

## 2018-08-09 RX ORDER — ACYCLOVIR 200 MG/1
0-1 CAPSULE ORAL
Status: DISCONTINUED | OUTPATIENT
Start: 2018-08-09 | End: 2018-08-10 | Stop reason: HOSPADM

## 2018-08-09 RX ORDER — REGADENOSON 0.08 MG/ML
0.4 INJECTION, SOLUTION INTRAVENOUS ONCE
Status: COMPLETED | OUTPATIENT
Start: 2018-08-09 | End: 2018-08-09

## 2018-08-09 RX ORDER — DIPHENHYDRAMINE HCL 25 MG
25 CAPSULE ORAL
Status: DISCONTINUED | OUTPATIENT
Start: 2018-08-09 | End: 2018-08-10 | Stop reason: HOSPADM

## 2018-08-09 RX ORDER — AMINOPHYLLINE 25 MG/ML
100 INJECTION, SOLUTION INTRAVENOUS ONCE
Status: DISCONTINUED | OUTPATIENT
Start: 2018-08-09 | End: 2018-08-10 | Stop reason: HOSPADM

## 2018-08-09 RX ADMIN — REGADENOSON 0.4 MG: 0.08 INJECTION, SOLUTION INTRAVENOUS at 15:10

## 2018-08-09 RX ADMIN — GADOBUTROL 15 ML: 604.72 INJECTION INTRAVENOUS at 15:51

## 2018-08-13 ENCOUNTER — OFFICE VISIT (OUTPATIENT)
Dept: CARDIOLOGY | Facility: CLINIC | Age: 68
End: 2018-08-13
Attending: NURSE PRACTITIONER
Payer: COMMERCIAL

## 2018-08-13 VITALS
HEIGHT: 64 IN | SYSTOLIC BLOOD PRESSURE: 156 MMHG | DIASTOLIC BLOOD PRESSURE: 80 MMHG | HEART RATE: 60 BPM | WEIGHT: 130.3 LBS | BODY MASS INDEX: 22.24 KG/M2

## 2018-08-13 DIAGNOSIS — Z95.1 S/P CABG (CORONARY ARTERY BYPASS GRAFT): ICD-10-CM

## 2018-08-13 DIAGNOSIS — E78.5 HYPERLIPIDEMIA LDL GOAL <70: ICD-10-CM

## 2018-08-13 DIAGNOSIS — I1A.0 RESISTANT HYPERTENSION: Primary | ICD-10-CM

## 2018-08-13 DIAGNOSIS — I70.1 RENAL ARTERY STENOSIS (H): ICD-10-CM

## 2018-08-13 PROCEDURE — 99214 OFFICE O/P EST MOD 30 MIN: CPT | Performed by: INTERNAL MEDICINE

## 2018-08-13 RX ORDER — DOXAZOSIN 4 MG/1
4 TABLET ORAL EVERY MORNING
Qty: 30 TABLET | Refills: 3 | Status: SHIPPED | OUTPATIENT
Start: 2018-08-13 | End: 2018-09-10

## 2018-08-13 NOTE — MR AVS SNAPSHOT
After Visit Summary   8/13/2018    Mati Pulliam    MRN: [id number]           Patient Information     Date Of Birth          1950        Visit Information        Provider Department      8/13/2018 3:45 PM Grant Morris MD [facility]        Today's Diagnoses     Resistant hypertension    -  1    Renal artery stenosis (H)        S/P CABG (coronary artery bypass graft)        Hyperlipidemia LDL goal <70          Care Instructions    1. Start Doxazosin 4 mg - one tablet in the morning.    2. No changes to other medications.    3. CT angiogram this week.    4. See Dr Warner in 2 weeks.    5. See Dr. Morris in 3 months.    If you have any questions or concerns, please call my nurse Leah Stephens at [phone].            Follow-ups after your visit        Additional Services     Follow-Up with Cardiologist                 Your next 10 appointments already scheduled     Aug 27, 2018  8:45 AM CDT   Return Visit with Cuate Warner MD   [facility] ([facility])    [address] [place] [id number] [phone] OPT 2            Dec 13, 2018 11:00 AM CST   Office Visit with Hai Mckee MD   [facility]    [address] [place] [id number] [phone]           Bring a current list of meds and any records pertaining to this visit. For Physicals, please bring immunization records and any forms needing to be filled out. Please arrive 10 minutes early to complete paperwork.              Future tests that were ordered for you today     Open Future Orders        Priority Expected Expires Ordered    Basic metabolic panel Routine 11/11/2018 8/13/2019 8/13/2018    EKG 12-lead complete w/read - Clinics Routine 11/11/2018 8/13/2019 8/13/2018    Follow-Up with Cardiologist Routine 11/11/2018 8/13/2019 8/13/2018    CT Abdomen Angio w/o & w Contrast Routine  "8/14/2018 8/13/2019 8/13/2018            Who to contact     If you have questions or need follow up information about today's clinic visit or your schedule please contact [facility] directly at [phone].  Normal or non-critical lab and imaging results will be communicated to you by MyChart, letter or phone within 4 business days after the clinic has received the results. If you do not hear from us within 7 days, please contact the clinic through evidanzahart or phone. If you have a critical or abnormal lab result, we will notify you by phone as soon as possible.  Submit refill requests through [employer] or call your pharmacy and they will forward the refill request to us. Please allow 3 business days for your refill to be completed.          Additional Information About Your Visit        evidanzahart Information     [employer] gives you secure access to your electronic health record. If you see a primary care provider, you can also send messages to your care team and make appointments. If you have questions, please call your primary care clinic.  If you do not have a primary care provider, please call [phone] and they will assist you.        Care EveryWhere ID     This is your Care EveryWhere ID. This could be used by other organizations to access your [facility] medical records  [id number]        Your Vitals Were     Pulse Height BMI (Body Mass Index)             60 1.613 m (5' 3.5\") 22.72 kg/m2          Blood Pressure from Last 3 Encounters:   08/13/18 156/80   08/09/18 137/64   07/31/18 163/75    Weight from Last 3 Encounters:   08/13/18 59.1 kg (130 lb 4.8 oz)   07/09/18 59.6 kg (131 lb 6.4 oz)   06/28/18 58.5 kg (129 lb)              We Performed the Following     Follow-Up with Cardiologist          Today's Medication Changes          These changes are accurate as of 8/13/18  4:02 PM.  If you have any questions, ask your nurse or doctor.               Start taking these " medicines.        Dose/Directions    doxazosin 4 MG tablet   Commonly known as:  CARDURA   Started by:  Grant Morris MD        Dose:  4 mg   Take 1 tablet (4 mg) by mouth every morning   Quantity:  30 tablet   Refills:  3            Where to get your medicines      These medications were sent to [id number] - [place], [place] - [address]  [address], [place] [address]     Phone:  [phone]     doxazosin 4 MG tablet                Primary Care Provider Office Phone # Fax #    Hai Mckee [phone] [address]  [place] [address]        Equal Access to Services     CRISTHIAN [facility]ESS : Hadii desiree ruano hadrhiannao Sobarbara, waaxda chidi, qaybta kaalmada adejonatanyada, lluvia guidry . So [facility] [phone].    ATENCIÓN: Si habla español, tiene a hamm disposición servicios gratuitos de asistencia lingüística. Llame al [phone].    We comply with applicable federal civil rights laws and [place] laws. We do not discriminate on the basis of race, color, national origin, age, disability, sex, sexual orientation, or gender identity.            Thank you!     Thank you for choosing [facility]  for your care. Our goal is always to provide you with excellent care. Hearing back from our patients is one way we can continue to improve our services. Please take a few minutes to complete the written survey that you may receive in the mail after your visit with us. Thank you!             Your Updated Medication List - Protect others around you: Learn how to safely use, store and throw away your medicines at www.disposemymeds.org.          This list is accurate as of 8/13/18  4:02 PM.  Always use your most recent med list.                   Brand Name Dispense Instructions for use Diagnosis    amLODIPine 5 MG tablet    NORVASC    60 tablet    Take 1 tablet (5 mg) by mouth 2 times daily         aspirin 81 MG tablet     100    ONE DAILY    Type II or unspecified type diabetes mellitus without mention of complication, not stated as uncontrolled       blood glucose monitoring meter device kit     1 kit    Use to test blood sugar once daily or as directed.    Type 2 diabetes, HbA1C goal < 8% (H)       blood glucose monitoring test strip    ONETOUCH ULTRA    100 strip    Use to test blood sugars once daily as directed.    Type 2 diabetes, HbA1C goal < 8% (H)       carvedilol 3.125 MG tablet    COREG    30 tablet    Take 1 tablet (3.125 mg) by mouth 2 times daily (with meals)        clopidogrel 75 MG tablet    PLAVIX    90 tablet    Take 1 tablet (75 mg) by mouth daily    History of stroke, S/P CABG (coronary artery bypass graft)       doxazosin 4 MG tablet    CARDURA    30 tablet    Take 1 tablet (4 mg) by mouth every morning        eplerenone 25 MG tablet    INSPRA    90 tablet    Take 1 tablet (25 mg) by mouth daily    Benign essential hypertension       fluticasone 50 MCG/ACT spray    FLONASE    1 Package    Spray 1-2 sprays into both nostrils daily    Cough       glimepiride 1 MG tablet    AMARYL     Take 1 mg by mouth every morning (before breakfast)        losartan 100 MG tablet    COZAAR    90 tablet    Take 1 tablet (100 mg) by mouth daily    Benign essential hypertension       metFORMIN 500 MG tablet    GLUCOPHAGE     Take 1,000 mg by mouth 2 times daily (with meals)        nitroGLYcerin 0.4 MG sublingual tablet    NITROSTAT    25 tablet    Place 1 tablet (0.4 mg) under the tongue See Admin Instructions for chest pain    Chronic ischemic heart disease       ONETOUCH DELICA LANCETS 33G Misc     100 each    1 Device daily    Type 2 diabetes, HbA1C goal < 8% (H)       OVER-THE-COUNTER      Beplex Forte 260  mcg every OTHER day        rosuvastatin 20 MG tablet    CRESTOR    90 tablet    Take 1 tablet (20 mg) by mouth daily    Hyperlipidemia LDL goal <70       salicylic acid 40 % Misc    MEDIPLAST    1 each     Apply 1 dose. topically At Bedtime Each night, Scrape or loofa the area and then soak foot for 10-15 min in warm water.  Cut plaster to fit exactly over 1 wart each.  Tape each in place for overnight and remove the next morning.    Callus of foot       STUDY MEDICATION      Metformin 1000 mg bid Glimepiride 1 mg daily  Medication provided by [facility] study only.  Coordinator: Raymundo Coy -[phone] PI: Shanon Guy -[phone]        vitamin D 2000 units Caps      Take 1 capsule by mouth every other day        ZANTAC 150 MAXIMUM STRENGTH PO      Take 150 mg by mouth daily

## 2018-08-13 NOTE — PATIENT INSTRUCTIONS
1. Start Doxazosin 4 mg - one tablet in the morning.    2. No changes to other medications.    3. CT angiogram this week.    4. See Dr Warner in 2 weeks.    5. See Dr. Morris in 3 months.    If you have any questions or concerns, please call my nurse Leah Stephens at [phone].

## 2018-08-13 NOTE — LETTER
8/13/2018    Hai Mckee MD  [address]  [facility] [address]    RE: Mati Pulliam       Dear Colleague,    I had the pleasure of seeing Mati Pulliam in the [facility].    Service Date: 08/13/2018      PRIMARY CARE AND REFERRING PROVIDER:  Hai Mckee MD      PRIMARY CARDIOLOGY TEAM:  Mars Vasquez MD, and LIZA Reynoso, CNP      REASONS FOR VISIT:     1.  Followup of chest pain with cardiac MRI stress test results.   2.  Resistant systolic hypertension with question of right renal artery stenosis.   3.  Side effects with antihypertensive therapy.      HISTORY OF PRESENT ILLNESS:  Mr. Mati Pulliam is known to me with his initial visit on 07/09/2018.  He is accompanied by his wife.  His established cardiologist, Dr. Vasquez, does not have available appointments, so I am seeing him.  The patient is a 67-year-old gentleman of  ([place] ) ancestry known to have CAD, status post CABG in 2009 (LIMA to LAD, vein graft to diagonal, OM2 and a separate vein graft to the right posterolateral branch), ischemic stroke postoperatively after his bypass surgery (no residual motor deficit, but has trouble with word finding), type 2 diabetes mellitus excellently controlled on oral medications, resistant systolic hypertension, never tobacco user.     1.  Resistant systolic hypertension.      On his last visit, I had stopped his chlorthalidone 12.5 mg daily due to hyponatremia of 125, cut back carvedilol dosage to 3.125 mg b.i.d. due to resting bradycardia of 44 BPM, but he has continued amlodipine 5 mg b.i.d., eplerenone 25 mg daily and losartan 100 mg daily.  With this, his blood pressure is improved, but remains elevated at 156/80 mmHg.  He brought his home BP logs, and these are all in the 150-160/70-86 mmHg range.  His pulse has improved.  With discontinuing some of the medications, his postural dizziness has resolved.  His resting pulse is now 60 BPM.  His 24  hour Holter shows an average heart rate of 60 BPM.      Hyponatremia has normalized to 135, potassium 4.1, BUN 13, creatinine 0.8 with an estimated GFR greater than 90.        He has potential right renal artery stenosis on renal ultrasound and is waiting to see Dr. Warner, the interventional cardiologist, regarding this.   2.  Chest pain.      He had 3 episodes of chest pain several weeks ago.  His cardiac MRI shows normal biventricular systolic function, no reversible ischemia.   3.  History of previous stroke.      The patient is on dual antiplatelet therapy with aspirin and clopidogrel due to this.      CURRENT MEDICATIONS:   1.  Amlodipine 5 mg twice daily.   2.  Aspirin 81 mg daily.   3.  Clopidogrel 75 mg daily.   4.  Carvedilol 3.125 mg twice daily.   5.  Eplerenone 25 mg daily.   6.  Losartan 100 mg daily.   7.  Metformin 1000 mg twice daily.   8.  Ranitidine 150 mg daily.   9.  Rosuvastatin 20 mg daily.   10.  Fluticasone inhaler.      ALLERGIES:  Cough with ACE inhibitor, headaches with valsartan, neck pressure with Monopril, hyponatremia with chlorthalidone and bradycardia with higher doses of carvedilol.      PHYSICAL EXAMINATION:   VITAL SIGNS:  Blood pressure 156/80, pulse 60 per minute and regular, height 1.613 m (5 feet 3 inches), weight 60 kg (130 pounds), BMI 22.7 kg/m2.   CONSTITUTIONAL:  Comfortable at rest, slim.   EYES:  No pallor.   CARDIOVASCULAR:  Normal JVP.  No carotid bruit.  A midline healed sternotomy scar.  Regular heart sounds.  No murmur, S3 or S4.   GASTROINTESTINAL:  Soft, nontender.  No renal artery bruit.   EXTREMITIES:  No edema.   NEUROPSYCHIATRIC:  Alert, oriented x3.  No gross motor deficits.      DIAGNOSES:     1.  Resistant systolic hypertension.   - We should aim for a goal blood pressure less than 130/80 mmHg.  I am starting him on doxazosin 4 mg daily.  His home blood pressure log suggests that his morning blood pressures are normal, but they start to increase during the  course of the day.  Continue carvedilol, amlodipine, losartan and eplerenone.   - I personally spoke with Dr. Cuate Warner.  He will expedite the patient's appointment and see him in a couple of weeks.  Prior to that, we will get a CT angiogram of his renal arteries.  The patient's creatinine is normal.  No contrast complications in the past.     2.  Stable coronary artery disease, status post CABG in .     - Chest pain unlikely to be coronary.  His cardiac MRI stress test is negative for ischemia.   3.  Prior stroke postoperatively after CABG.   - Continue dual antiplatelet therapy with aspirin and clopidogrel.   4.  Type 2 diabetes (well controlled on metformin).   5.  Followup:  The patient sees Dr. Warner in 2 weeks with a previsit CT angiogram of the renal arteries.  I will see him in 3 months, and his subsequent followup will be with his established cardiologist, Dr. Mars Vasquez.      It was my pleasure to visit with this patient.  I spent 45 minutes with him; greater than 50% of the time was spent in counseling and coordination of care.         ELLY GUALLPA MD             D: 2018   T: 2018   MT: so      Name:     CORINE BILLY   MRN:      [id number]        Account:      [id number]   :      1950           Service Date: 2018      Document: [id number]       Thank you for allowing me to participate in the care of your patient.      Sincerely,     Elly Guallpa MD     [facility]    cc:   Ever Will, APRN CNP  [address]  [place] [address]

## 2018-08-13 NOTE — LETTER
8/13/2018      Hai Mckee MD  [address]  [facility] [address]      RE: Mati Pulliam       Dear Colleague,    I had the pleasure of seeing Mati Pulliam in the [facility].    Service Date: 08/13/2018      PRIMARY CARE AND REFERRING PROVIDER:  Hai Mckee MD      PRIMARY CARDIOLOGY TEAM:  Mars Vasquez MD, and LIZA Reynoso, CNP      REASONS FOR VISIT:     1.  Followup of chest pain with cardiac MRI stress test results.   2.  Resistant systolic hypertension with question of right renal artery stenosis.   3.  Side effects with antihypertensive therapy.      HISTORY OF PRESENT ILLNESS:  Mr. Mati Pulliam is known to me with his initial visit on 07/09/2018.  He is accompanied by his wife.  His established cardiologist, Dr. Vasquez, does not have available appointments, so I am seeing him.  The patient is a 67-year-old gentleman of  ([place] ) ancestry known to have CAD, status post CABG in 2009 (LIMA to LAD, vein graft to diagonal, OM2 and a separate vein graft to the right posterolateral branch), ischemic stroke postoperatively after his bypass surgery (no residual motor deficit, but has trouble with word finding), type 2 diabetes mellitus excellently controlled on oral medications, resistant systolic hypertension, never tobacco user.     1.  Resistant systolic hypertension.      On his last visit, I had stopped his chlorthalidone 12.5 mg daily due to hyponatremia of 125, cut back carvedilol dosage to 3.125 mg b.i.d. due to resting bradycardia of 44 BPM, but he has continued amlodipine 5 mg b.i.d., eplerenone 25 mg daily and losartan 100 mg daily.  With this, his blood pressure is improved, but remains elevated at 156/80 mmHg.  He brought his home BP logs, and these are all in the 150-160/70-86 mmHg range.  His pulse has improved.  With discontinuing some of the medications, his postural dizziness has resolved.  His resting pulse is now 60 BPM.  His  24 hour Holter shows an average heart rate of 60 BPM.      Hyponatremia has normalized to 135, potassium 4.1, BUN 13, creatinine 0.8 with an estimated GFR greater than 90.        He has potential right renal artery stenosis on renal ultrasound and is waiting to see Dr. Warner, the interventional cardiologist, regarding this.   2.  Chest pain.      He had 3 episodes of chest pain several weeks ago.  His cardiac MRI shows normal biventricular systolic function, no reversible ischemia.   3.  History of previous stroke.      The patient is on dual antiplatelet therapy with aspirin and clopidogrel due to this.      CURRENT MEDICATIONS:   1.  Amlodipine 5 mg twice daily.   2.  Aspirin 81 mg daily.   3.  Clopidogrel 75 mg daily.   4.  Carvedilol 3.125 mg twice daily.   5.  Eplerenone 25 mg daily.   6.  Losartan 100 mg daily.   7.  Metformin 1000 mg twice daily.   8.  Ranitidine 150 mg daily.   9.  Rosuvastatin 20 mg daily.   10.  Fluticasone inhaler.      ALLERGIES:  Cough with ACE inhibitor, headaches with valsartan, neck pressure with Monopril, hyponatremia with chlorthalidone and bradycardia with higher doses of carvedilol.      PHYSICAL EXAMINATION:   VITAL SIGNS:  Blood pressure 156/80, pulse 60 per minute and regular, height 1.613 m (5 feet 3 inches), weight 60 kg (130 pounds), BMI 22.7 kg/m2.   CONSTITUTIONAL:  Comfortable at rest, slim.   EYES:  No pallor.   CARDIOVASCULAR:  Normal JVP.  No carotid bruit.  A midline healed sternotomy scar.  Regular heart sounds.  No murmur, S3 or S4.   GASTROINTESTINAL:  Soft, nontender.  No renal artery bruit.   EXTREMITIES:  No edema.   NEUROPSYCHIATRIC:  Alert, oriented x3.  No gross motor deficits.      DIAGNOSES:     1.  Resistant systolic hypertension.   - We should aim for a goal blood pressure less than 130/80 mmHg.  I am starting him on doxazosin 4 mg daily.  His home blood pressure log suggests that his morning blood pressures are normal, but they start to increase during  the course of the day.  Continue carvedilol, amlodipine, losartan and eplerenone.   - I personally spoke with Dr. Cuate Warner.  He will expedite the patient's appointment and see him in a couple of weeks.  Prior to that, we will get a CT angiogram of his renal arteries.  The patient's creatinine is normal.  No contrast complications in the past.     2.  Stable coronary artery disease, status post CABG in .     - Chest pain unlikely to be coronary.  His cardiac MRI stress test is negative for ischemia.   3.  Prior stroke postoperatively after CABG.   - Continue dual antiplatelet therapy with aspirin and clopidogrel.   4.  Type 2 diabetes (well controlled on metformin).   5.  Followup:  The patient sees Dr. Warner in 2 weeks with a previsit CT angiogram of the renal arteries.  I will see him in 3 months, and his subsequent followup will be with his established cardiologist, Dr. Mars Vasquez.      It was my pleasure to visit with this patient.  I spent 45 minutes with him; greater than 50% of the time was spent in counseling and coordination of care.         ELLY GUALLPA MD             D: 2018   T: 2018   MT: so      Name:     CORINE BILLY   MRN:      [id number]        Account:      [id number]   :      1950           Service Date: 2018      Document: [id number]       Outpatient Encounter Prescriptions as of 2018   Medication Sig Dispense Refill     amLODIPine (NORVASC) 5 MG tablet Take 1 tablet (5 mg) by mouth 2 times daily 60 tablet 3     ASPIRIN 81 MG OR TABS ONE DAILY 100 3     carvedilol (COREG) 3.125 MG tablet Take 1 tablet (3.125 mg) by mouth 2 times daily (with meals) 30 tablet 3     Cholecalciferol (VITAMIN D) 2000 UNITS CAPS Take 1 capsule by mouth every other day       clopidogrel (PLAVIX) 75 MG tablet Take 1 tablet (75 mg) by mouth daily 90 tablet 2     doxazosin (CARDURA) 4 MG tablet Take 1 tablet (4 mg) by mouth every morning 30 tablet 3     eplerenone (INSPRA)  25 MG tablet Take 1 tablet (25 mg) by mouth daily 90 tablet 3     glimepiride (AMARYL) 1 MG tablet Take 1 mg by mouth every morning (before breakfast)        losartan (COZAAR) 100 MG tablet Take 1 tablet (100 mg) by mouth daily 90 tablet 2     metFORMIN (GLUCOPHAGE) 500 MG tablet Take 1,000 mg by mouth 2 times daily (with meals)       nitroglycerin (NITROSTAT) 0.4 MG sublingual tablet Place 1 tablet (0.4 mg) under the tongue See Admin Instructions for chest pain 25 tablet 3     RaNITidine HCl (ZANTAC 150 MAXIMUM STRENGTH PO) Take 150 mg by mouth daily       rosuvastatin (CRESTOR) 20 MG tablet Take 1 tablet (20 mg) by mouth daily 90 tablet 2     blood glucose (ONE TOUCH ULTRA) test strip Use to test blood sugars once daily as directed. 100 strip PRN     blood glucose monitoring (ONE TOUCH ULTRA MINI) meter device kit Use to test blood sugar once daily or as directed. 1 kit      fluticasone (FLONASE) 50 MCG/ACT nasal spray Spray 1-2 sprays into both nostrils daily (Patient not taking: Reported on 8/13/2018) 1 Package 11     ONETOUCH DELICA LANCETS 33G MISC 1 Device daily 100 each prn     OVER-THE-COUNTER Beplex Forte 260  mcg every OTHER day       salicylic acid (MEDIPLAST) 40 % MISC Apply 1 dose. topically At Bedtime Each night, Scrape or loofa the area and then soak foot for 10-15 min in warm water.  Cut plaster to fit exactly over 1 wart each.  Tape each in place for overnight and remove the next morning. 1 each 11     STUDY MEDICATION Metformin 1000 mg bid  Glimepiride 1 mg daily    Medication provided by GRADE study only.   Coordinator: Raymundo Coy, BENTLEY [phone]  PI: Shanon Guy -[id number]       No facility-administered encounter medications on file as of 8/13/2018.        Again, thank you for allowing me to participate in the care of your patient.      Sincerely,    Grant Morris MD     [facility]

## 2018-08-14 NOTE — PROGRESS NOTES
Service Date: 08/13/2018      PRIMARY CARE AND REFERRING PROVIDER:  Hai Mckee MD      PRIMARY CARDIOLOGY TEAM:  Mars Vasquez MD, and LIZA Reynoso, CNP      REASONS FOR VISIT:     1.  Followup of chest pain with cardiac MRI stress test results.   2.  Resistant systolic hypertension with question of right renal artery stenosis.   3.  Side effects with antihypertensive therapy.      HISTORY OF PRESENT ILLNESS:    Mr. Mati Pulliam is known to me with his initial visit on 07/09/2018.  He is accompanied by his wife.  His established cardiologist, Dr. Vasquez, does not have upcoming appointments, so I am seeing him.  The patient is a 67-year-old gentleman of  ([place] ) ancestry known to have CAD, status post CABG in 2009 (LIMA to LAD, vein graft to diagonal, OM2 and a separate vein graft to the right posterolateral branch), ischemic stroke postoperatively after his bypass surgery (no residual motor deficit, but has trouble with word finding), type 2 diabetes mellitus excellently controlled on oral medications, resistant systolic hypertension, never tobacco user.       He has possiblel right renal artery stenosis on renal ultrasound and is waiting to see Dr. Warner, the interventional cardiologist, regarding this.     1.  Resistant systolic hypertension.      On his last visit, I had stopped his chlorthalidone 12.5 mg daily due to hyponatremia of 125, cut back carvedilol dosage to 3.125 mg b.i.d. due to resting bradycardia of 44 BPM, but he has continued amlodipine 5 mg b.i.d., eplerenone 25 mg daily and losartan 100 mg daily.  With this, his blood pressure is improved, but remains elevated at 156/80 mmHg.  He brought his home BP logs, and these are all in the 150-160/70-86 mmHg range.  His pulse has improved.  With discontinuing some of the medications, his postural dizziness has resolved.  His resting pulse is now 60 BPM.  His 24 hour Holter shows an average heart rate of 60 BPM.       Hyponatremia has normalized to 135, potassium 4.1, BUN 13, creatinine 0.8 with an estimated GFR greater than 90.       2.  Chest pain.      He had 3 episodes of chest pain several weeks ago.  His cardiac MRI shows normal biventricular systolic function, no reversible ischemia.     3.  History of previous stroke.      The patient is on dual antiplatelet therapy with aspirin and clopidogrel due to this.      CURRENT MEDICATIONS:   1.  Amlodipine 5 mg twice daily.   2.  Aspirin 81 mg daily.   3.  Clopidogrel 75 mg daily.   4.  Carvedilol 3.125 mg twice daily.   5.  Eplerenone 25 mg daily.   6.  Losartan 100 mg daily.   7.  Metformin 1000 mg twice daily.   8.  Ranitidine 150 mg daily.   9.  Rosuvastatin 20 mg daily.   10.  Fluticasone inhaler.      ALLERGIES:  Cough with ACE inhibitor, headaches with valsartan, neck pressure with Monopril, hyponatremia with chlorthalidone and bradycardia with higher doses of carvedilol.      PHYSICAL EXAMINATION:   VITAL SIGNS:  Blood pressure 156/80, pulse 60 per minute and regular, height 1.613 m (5 feet 3 inches), weight 60 kg (130 pounds), BMI 22.7 kg/m2.   CONSTITUTIONAL:  Comfortable at rest, slim.   EYES:  No pallor.   CARDIOVASCULAR:  Normal JVP.  No carotid bruit.  A midline healed sternotomy scar.  Regular heart sounds.  No murmur, S3 or S4.   GASTROINTESTINAL:  Soft, nontender.  No renal artery bruit.   EXTREMITIES:  No edema.   NEUROPSYCHIATRIC:  Alert, oriented x3.  No gross motor deficits.      DIAGNOSES:     1.  Resistant systolic hypertension.   - We should aim for a goal blood pressure less than 130/80 mmHg.  I am starting him on doxazosin 4 mg daily.  His home blood pressure log suggests that his morning blood pressures are normal, but they start to increase during the course of the day.  Continue carvedilol, amlodipine, losartan and eplerenone.   - I personally spoke with Dr. Cuate Warner.  He will expedite the patient's appointment and see him in a couple of weeks.   Prior to that, we will get a CT angiogram of his renal arteries.  The patient's creatinine is normal.  No contrast complications in the past.       2.  Stable coronary artery disease, status post CABG in .     - Chest pain unlikely to be coronary.  His cardiac MRI stress test is negative for ischemia.     3.  Prior stroke postoperatively after CABG.   - Continue dual antiplatelet therapy with aspirin and clopidogrel.     4.  Type 2 diabetes   - well controlled on metformin.     5.  Followup:  The patient sees Dr. Warner in 2 weeks with a previsit CT angiogram of the renal arteries.  I will see him in 3 months, and his subsequent followup will be with his established cardiologist, Dr. Mars Vasquez.      It was my pleasure to visit with this patient.  I spent 45 minutes with him; greater than 50% of the time was spent in counseling and coordination of care.         ELLY GUALLPA MD             D: 2018   T: 2018   MT: so      Name:     CORINE BILLY   MRN:      [id number]        Account:      [id number]   :      1950           Service Date: 2018      Document: [id number]

## 2018-08-21 DIAGNOSIS — I25.10 CAD (CORONARY ARTERY DISEASE): Primary | ICD-10-CM

## 2018-08-21 RX ORDER — CARVEDILOL 3.12 MG/1
3.12 TABLET ORAL 2 TIMES DAILY WITH MEALS
Qty: 30 TABLET | Refills: 3 | Status: SHIPPED | OUTPATIENT
Start: 2018-08-21 | End: 2018-09-10

## 2018-08-23 ENCOUNTER — HOSPITAL ENCOUNTER (OUTPATIENT)
Dept: CARDIOLOGY | Facility: CLINIC | Age: 68
Discharge: HOME | End: 2018-08-23
Attending: INTERNAL MEDICINE | Admitting: INTERNAL MEDICINE
Payer: MEDICARE

## 2018-08-23 ENCOUNTER — DOCUMENTATION ONLY (OUTPATIENT)
Dept: ENDOCRINOLOGY | Facility: CLINIC | Age: 68
End: 2018-08-23

## 2018-08-23 VITALS — DIASTOLIC BLOOD PRESSURE: 74 MMHG | SYSTOLIC BLOOD PRESSURE: 159 MMHG

## 2018-08-23 DIAGNOSIS — I70.1 RENAL ARTERY STENOSIS (H): ICD-10-CM

## 2018-08-23 DIAGNOSIS — I1A.0 RESISTANT HYPERTENSION: ICD-10-CM

## 2018-08-23 LAB
CREAT BLD-MCNC: 0.9 MG/DL (ref 0.66–1.25)
GFR SERPL CREATININE-BSD FRML MDRD: 85 ML/MIN/1.7M2
HBA1C MFR BLD: 6.6 % (ref 0–5.7)
MICROALBUMIN - QUEST: 236

## 2018-08-23 PROCEDURE — 74175 CTA ABDOMEN W/CONTRAST: CPT

## 2018-08-23 PROCEDURE — 25000128 H RX IP 250 OP 636: Performed by: INTERNAL MEDICINE

## 2018-08-23 PROCEDURE — 82565 ASSAY OF CREATININE: CPT

## 2018-08-23 RX ORDER — IOPAMIDOL 755 MG/ML
150 INJECTION, SOLUTION INTRAVASCULAR ONCE
Status: COMPLETED | OUTPATIENT
Start: 2018-08-23 | End: 2018-08-23

## 2018-08-23 RX ADMIN — SODIUM CHLORIDE 100 ML: 9 INJECTION, SOLUTION INTRAVENOUS at 15:28

## 2018-08-23 RX ADMIN — IOPAMIDOL 100 ML: 755 INJECTION, SOLUTION INTRAVENOUS at 15:28

## 2018-08-23 NOTE — PROGRESS NOTES
GRADE study visit     Patient is here for 42 month GRADE study visit. Patient is doing well and continues on Metformin 1000 mg bid in addition to randomized treatment of glimepiride.  He is currently taking 1.0 mg daily (he increased this a few days ago when he saw his sugars climb up to 140 in the AM once).  BP remains high both here and at home, despite 5 antihypertensives including spironolactone. Heart rate down into the 40's. He was found to have possible RAMONA and will be undergoing a CT angiogram of his abdomen today for further evaluation.   He otherwise has been feeling well and in his usual state of health.      BP: 141/59 pulse 44    Lab results:   A1c: 6.6%  Albumin:creatinine Ratio: 236.00     Plan:   1. Diabetes- A1c at target. No hypoglycemia. No changes. Advised to remain on Amaryl 0.5 mg daily and not increase until seeing glucose profile x 1 week. At risk for severe hypoglycemia.   2. Vitamin B12 deficiency- currently on 1000 daily.  Level back in normal range.   3.  Albuminuria- his microalbuminuria has progressed to albuminuria.  He is on 5 antihypertensives.  He will be having a CTangio today of his abdomen to assess for RAMONA. Following with PCP and cardiology.   4. Follow up in 3 months, sooner with concerns.      Lulu Cortés PA-C, MPAS   [facility]  Department of Medicine  Division of Endocrinology and Diabetes

## 2018-08-27 ENCOUNTER — OFFICE VISIT (OUTPATIENT)
Dept: CARDIOLOGY | Facility: CLINIC | Age: 68
End: 2018-08-27
Payer: COMMERCIAL

## 2018-08-27 VITALS
HEIGHT: 64 IN | WEIGHT: 131 LBS | DIASTOLIC BLOOD PRESSURE: 71 MMHG | BODY MASS INDEX: 22.36 KG/M2 | SYSTOLIC BLOOD PRESSURE: 143 MMHG | HEART RATE: 51 BPM

## 2018-08-27 DIAGNOSIS — E11.9 TYPE 2 DIABETES MELLITUS WITHOUT COMPLICATION, WITHOUT LONG-TERM CURRENT USE OF INSULIN (H): ICD-10-CM

## 2018-08-27 DIAGNOSIS — Z86.73 HISTORY OF STROKE: ICD-10-CM

## 2018-08-27 DIAGNOSIS — Z95.1 STATUS POST AORTO-CORONARY ARTERY BYPASS GRAFT: ICD-10-CM

## 2018-08-27 DIAGNOSIS — N28.1 RENAL CYST: ICD-10-CM

## 2018-08-27 DIAGNOSIS — R07.9 CHEST PAIN, UNSPECIFIED TYPE: ICD-10-CM

## 2018-08-27 DIAGNOSIS — I15.0 RENOVASCULAR HYPERTENSION: Primary | ICD-10-CM

## 2018-08-27 DIAGNOSIS — I71.40 ABDOMINAL AORTIC ANEURYSM (AAA) WITHOUT RUPTURE (H): ICD-10-CM

## 2018-08-27 PROCEDURE — 99214 OFFICE O/P EST MOD 30 MIN: CPT | Performed by: INTERNAL MEDICINE

## 2018-08-27 NOTE — PROGRESS NOTES
Service Date: 08/27/2018      REFERRING PHYSICIAN:  Dr. Hai Mckee.      REASON FOR VISIT:  I had the pleasure of seeing Mati Pulliam at the [facility] in [place] this morning.  As you know, he is a very pleasant 67-year-old gentleman with a history of coronary artery disease status post prior CABG, ischemic stroke, type 2 diabetes and resistant systolic hypertension who is here for further evaluation for possible renal artery stenosis.      He has had difficult-to-control hypertension over the past several months, although with the recent medication adjustments his blood pressure appears to be reasonably controlled.  He had an ultrasound of his renal arteries in July of this year.  The ultrasound was read as showing elevated velocities at the origin and mid right renal artery suggesting right renal artery stenosis.  The left renal artery did not have any significantly elevated velocities.  The ultrasound also showed 3 simple cysts measuring 2.5, 2.7 and 2.3 cm.      Given the ultrasound findings and the patient's resistant hypertension, a CTA was performed to further evaluate the possibility of significant underlying renal artery stenosis.  The CTA, however, showed no evidence of renal artery stenosis.  A tortuosity of the proximal right renal artery was noted corresponding to the slightly elevated velocities on the ultrasound exam.  There was no anatomic stenosis.  The CTA showed a small infrarenal abdominal aortic aneurysm measuring 3.0 cm transverse and 2.8 cm AP.  Again, left renal artery cysts measuring up to 2.8 cm were seen on the CT.      IMPRESSION, REPORT, PLAN:   1.  Resistant hypertension.   2.  Coronary artery disease status post prior CABG.   3.  Type 2 diabetes.   4.  History of CVA.        I reviewed the patient's recent ultrasound as well as CT findings with him.  The CTA showed no evidence of renal artery stenosis.  I agree with the CT findings and the mildly  elevated velocities on the ultrasound were likely related to the right renal artery tortuosity.      Fortunately, his blood pressure has been better controlled over the last month after Cardura was added to his medical program.  I have asked him to continue his current medical program and to monitor his blood pressure at home closely.      He will need a followup ultrasound in approximately 1 year to reassess the small infrarenal abdominal aortic aneurysm.  I would also recommend another renal ultrasound to follow up with the cysts, which are thought to be benign, in approximately 1 year.      I appreciate the opportunity to be part of this patient's care.      cc:   Hai Mckee MD    [address], [address]    [place] [address]         NILE LOCKE MD             D: 2018   T: 2018   MT: JUAN C      Name:     CORINE BILLY   MRN:      [id number]        Account:      [id number]   :      1950           Service Date: 2018      Document: [id number]

## 2018-08-27 NOTE — MR AVS SNAPSHOT
After Visit Summary   8/27/2018    Mati Pulliam    MRN: [id number]           Patient Information     Date Of Birth          1950        Visit Information        Provider Department      8/27/2018 8:45 AM Cuate Warner MD [facility]        Today's Diagnoses     Renovascular hypertension    -  1    Chest pain, unspecified type        Status post aorto-coronary artery bypass graft        Type 2 diabetes mellitus without complication, without long-term current use of insulin (H)        History of stroke        Abdominal aortic aneurysm (AAA) without rupture (H)        Renal cyst           Follow-ups after your visit        Your next 10 appointments already scheduled     Nov 27, 2018  1:00 PM CST   LAB with [facility]   [facility] [place] ([facility])    [address] [address] [phone]           Please do not eat 10-12 hours before your appointment if you are coming in fasting for labs on lipids, cholesterol, or glucose (sugar). This does not apply to pregnant women. Water, hot tea and black coffee (with nothing added) are okay. Do not drink other fluids, diet soda or chew gum.            Nov 27, 2018  1:45 PM CST   Return Visit with Grant Morris MD   [facility] ([facility])    [facility] [address] [address] [phone] OPT 2            Dec 13, 2018 11:00 AM CST   Office Visit with Hai Mckee MD   [facility]    [address] [facility] [address] [phone]           Bring a current list of meds and any records pertaining to this visit. For Physicals, please bring immunization records and any forms needing to be filled out. Please arrive 10 minutes early to complete paperwork.              Future tests that were ordered for you today     Open Future  "Orders        Priority Expected Expires Ordered    US Renal Complete w Doppler Complete Routine 8/6/2019 8/27/2019 8/27/2018    US Aorta/Ivc/Iliac Duplex Complete Routine 8/7/2019 8/27/2019 8/27/2018            Who to contact     If you have questions or need follow up information about today's clinic visit or your schedule please contact [facility] directly at [phone].  Normal or non-critical lab and imaging results will be communicated to you by [employer], letter or phone within 4 business days after the clinic has received the results. If you do not hear from us within 7 days, please contact the clinic through [employer] or phone. If you have a critical or abnormal lab result, we will notify you by phone as soon as possible.  Submit refill requests through [employer] or call your pharmacy and they will forward the refill request to us. Please allow 3 business days for your refill to be completed.          Additional Information About Your Visit        Adan Information     [employer]an gives you secure access to your electronic health record. If you see a primary care provider, you can also send messages to your care team and make appointments. If you have questions, please call your primary care clinic.  If you do not have a primary care provider, please call [phone] and they will assist you.        Care EveryWhere ID     This is your Care EveryWhere ID. This could be used by other organizations to access your [place] [facility] medical records  [id number]        Your Vitals Were     Pulse Height BMI (Body Mass Index)             51 1.613 m (5' 3.5\") 22.84 kg/m2          Blood Pressure from Last 3 Encounters:   08/27/18 143/71   08/23/18 159/74   08/13/18 156/80    Weight from Last 3 Encounters:   08/27/18 59.4 kg (131 lb)   08/13/18 59.1 kg (130 lb 4.8 oz)   07/09/18 59.6 kg (131 lb 6.4 oz)              We Performed the Following     Follow-Up with Cardiologist        Primary Care " Provider Office Phone # Fax #    Hai Mckee [phone] [address] [address]        Equal Access to Services     SAVANAH WALKER : Hadii aad ku hadrhiannao Sokimali, waaxda luqadaha, qaybta kaalmada adetashada, lluvia greene laSalravindra galvin. So [facility] [phone].    ATENCIÓN: Si habla español, tiene a hamm disposición servicios gratuitos de asistencia lingüística. Llame al [phone].    We comply with applicable federal civil rights laws and [place] laws. We do not discriminate on the basis of race, color, national origin, age, disability, sex, sexual orientation, or gender identity.            Thank you!     Thank you for choosing [facility]  for your care. Our goal is always to provide you with excellent care. Hearing back from our patients is one way we can continue to improve our services. Please take a few minutes to complete the written survey that you may receive in the mail after your visit with us. Thank you!             Your Updated Medication List - Protect others around you: Learn how to safely use, store and throw away your medicines at www.disposemymeds.org.          This list is accurate as of 8/27/18  9:25 AM.  Always use your most recent med list.                   Brand Name Dispense Instructions for use Diagnosis    amLODIPine 5 MG tablet    NORVASC    60 tablet    Take 1 tablet (5 mg) by mouth 2 times daily        aspirin 81 MG tablet     100    ONE DAILY    Type II or unspecified type diabetes mellitus without mention of complication, not stated as uncontrolled       blood glucose monitoring meter device kit     1 kit    Use to test blood sugar once daily or as directed.    Type 2 diabetes, HbA1C goal < 8% (H)       blood glucose monitoring test strip    ONETOUCH ULTRA    100 strip    Use to test blood sugars once daily as directed.    Type 2 diabetes, HbA1C goal < 8% (H)       carvedilol 3.125 MG tablet     COREG    30 tablet    Take 1 tablet (3.125 mg) by mouth 2 times daily (with meals)    CAD (coronary artery disease)       clopidogrel 75 MG tablet    PLAVIX    90 tablet    Take 1 tablet (75 mg) by mouth daily    History of stroke, S/P CABG (coronary artery bypass graft)       doxazosin 4 MG tablet    CARDURA    30 tablet    Take 1 tablet (4 mg) by mouth every morning        eplerenone 25 MG tablet    INSPRA    90 tablet    Take 1 tablet (25 mg) by mouth daily    Benign essential hypertension       fluticasone 50 MCG/ACT spray    FLONASE    1 Package    Spray 1-2 sprays into both nostrils daily    Cough       glimepiride 1 MG tablet    AMARYL     Take 0.5 mg by mouth every morning (before breakfast)        losartan 100 MG tablet    COZAAR    90 tablet    Take 1 tablet (100 mg) by mouth daily    Benign essential hypertension       metFORMIN 500 MG tablet    GLUCOPHAGE     Take 1,000 mg by mouth 2 times daily (with meals)        nitroGLYcerin 0.4 MG sublingual tablet    NITROSTAT    25 tablet    Place 1 tablet (0.4 mg) under the tongue See Admin Instructions for chest pain    Chronic ischemic heart disease       ONETOUCH DELICA LANCETS 33G Misc     100 each    1 Device daily    Type 2 diabetes, HbA1C goal < 8% (H)       rosuvastatin 20 MG tablet    CRESTOR    90 tablet    Take 1 tablet (20 mg) by mouth daily    Hyperlipidemia LDL goal <70       salicylic acid 40 % Misc    MEDIPLAST    1 each    Apply 1 dose. topically At Bedtime Each night, Scrape or loofa the area and then soak foot for 10-15 min in warm water.  Cut plaster to fit exactly over 1 wart each.  Tape each in place for overnight and remove the next morning.    Callus of foot       STUDY MEDICATION      Metformin 1000 mg bid Glimepiride 1 mg daily  Medication provided by [facility] study only.  Coordinator: Raymundo Coy -[id number] PI: Shanon Guy -[phone]        vitamin D 2000 units Caps      Take 1 capsule by mouth every other day        ZANTAC 150  MAXIMUM STRENGTH PO      Take 150 mg by mouth 2 times daily

## 2018-08-27 NOTE — LETTER
8/27/2018      Hai Mckee MD  [address] [facility] [address]  [facility] [address]      RE: Mati Pulliam       Dear Colleague,    I had the pleasure of seeing Mati Pulliam in the [facility].    Service Date: 08/27/2018      REFERRING PHYSICIAN:  Dr. Hai Mckee.      REASON FOR VISIT:  I had the pleasure of seeing Mati Pulliam at the [facility] in [place] this morning.  As you know, he is a very pleasant 67-year-old gentleman with a history of coronary artery disease status post prior CABG, ischemic stroke, type 2 diabetes and resistant systolic hypertension who is here for further evaluation for possible renal artery stenosis.      He has had difficult-to-control hypertension over the past several months, although with the recent medication adjustments his blood pressure appears to be reasonably controlled.  He had an ultrasound of his renal arteries in July of this year.  The ultrasound was read as showing elevated velocities at the origin and mid right renal artery suggesting right renal artery stenosis.  The left renal artery did not have any significantly elevated velocities.  The ultrasound also showed 3 simple cysts measuring 2.5, 2.7 and 2.3 cm.      Given the ultrasound findings and the patient's resistant hypertension, a CTA was performed to further evaluate the possibility of significant underlying renal artery stenosis.  The CTA, however, showed no evidence of renal artery stenosis.  A tortuosity of the proximal right renal artery was noted corresponding to the slightly elevated velocities on the ultrasound exam.  There was no anatomic stenosis.  The CTA showed a small infrarenal abdominal aortic aneurysm measuring 3.0 cm transverse and 2.8 cm AP.  Again, left renal artery cysts measuring up to 2.8 cm were seen on the CT.      IMPRESSION, REPORT, PLAN:   1.  Resistant hypertension.   2.  Coronary artery disease status post prior CABG.   3.  Type 2  diabetes.   4.  History of CVA.        I reviewed the patient's recent ultrasound as well as CT findings with him.  The CTA showed no evidence of renal artery stenosis.  I agree with the CT findings and the mildly elevated velocities on the ultrasound were likely related to the right renal artery tortuosity.      Fortunately, his blood pressure has been better controlled over the last month after Cardura was added to his medical program.  I have asked him to continue his current medical program and to monitor his blood pressure at home closely.      He will need a followup ultrasound in approximately 1 year to reassess the small infrarenal abdominal aortic aneurysm.  I would also recommend another renal ultrasound to follow up with the cysts, which are thought to be benign, in approximately 1 year.      I appreciate the opportunity to be part of this patient's care.      cc:   Hai Mckee MD    [address], [address]    [place] [address]         NILE LOCKE MD             D: 2018   T: 2018   MT: JUAN C      Name:     CORINE BILLY   MRN:      [id number]        Account:      [id number]   :      1950           Service Date: 2018      Document: [id number]         Outpatient Encounter Prescriptions as of 2018   Medication Sig Dispense Refill     amLODIPine (NORVASC) 5 MG tablet Take 1 tablet (5 mg) by mouth 2 times daily 60 tablet 3     ASPIRIN 81 MG OR TABS ONE DAILY 100 3     carvedilol (COREG) 3.125 MG tablet Take 1 tablet (3.125 mg) by mouth 2 times daily (with meals) 30 tablet 3     Cholecalciferol (VITAMIN D) 2000 UNITS CAPS Take 1 capsule by mouth every other day       clopidogrel (PLAVIX) 75 MG tablet Take 1 tablet (75 mg) by mouth daily 90 tablet 2     doxazosin (CARDURA) 4 MG tablet Take 1 tablet (4 mg) by mouth every morning 30 tablet 3     eplerenone (INSPRA) 25 MG tablet Take 1 tablet (25 mg) by mouth daily 90 tablet 3      fluticasone (FLONASE) 50 MCG/ACT nasal spray Spray 1-2 sprays into both nostrils daily 1 Package 11     glimepiride (AMARYL) 1 MG tablet Take 0.5 mg by mouth every morning (before breakfast)        losartan (COZAAR) 100 MG tablet Take 1 tablet (100 mg) by mouth daily 90 tablet 2     metFORMIN (GLUCOPHAGE) 500 MG tablet Take 1,000 mg by mouth 2 times daily (with meals)       nitroglycerin (NITROSTAT) 0.4 MG sublingual tablet Place 1 tablet (0.4 mg) under the tongue See Admin Instructions for chest pain 25 tablet 3     RaNITidine HCl (ZANTAC 150 MAXIMUM STRENGTH PO) Take 150 mg by mouth 2 times daily        rosuvastatin (CRESTOR) 20 MG tablet Take 1 tablet (20 mg) by mouth daily 90 tablet 2     salicylic acid (MEDIPLAST) 40 % MISC Apply 1 dose. topically At Bedtime Each night, Scrape or loofa the area and then soak foot for 10-15 min in warm water.  Cut plaster to fit exactly over 1 wart each.  Tape each in place for overnight and remove the next morning. 1 each 11     STUDY MEDICATION Metformin 1000 mg bid  Glimepiride 1 mg daily    Medication provided by [facility] study only.   Coordinator: Raymundo Coy -[id number]  PI: Shanon Guy -3113       blood glucose (ONE TOUCH ULTRA) test strip Use to test blood sugars once daily as directed. 100 strip PRN     blood glucose monitoring (ONE TOUCH ULTRA MINI) meter device kit Use to test blood sugar once daily or as directed. 1 kit      ONETOUCH DELICA LANCETS 33G MISC 1 Device daily 100 each prn     [DISCONTINUED] OVER-THE-COUNTER Beplex Forte 260  mcg every OTHER day       No facility-administered encounter medications on file as of 8/27/2018.        Again, thank you for allowing me to participate in the care of your patient.      Sincerely,    Cuate Warner MD, MD     [facility]
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
## 2018-09-10 DIAGNOSIS — I25.10 CAD (CORONARY ARTERY DISEASE): ICD-10-CM

## 2018-09-10 DIAGNOSIS — I12.9 RENAL HYPERTENSION: Primary | ICD-10-CM

## 2018-09-10 DIAGNOSIS — I10 BENIGN ESSENTIAL HYPERTENSION: ICD-10-CM

## 2018-09-10 RX ORDER — DOXAZOSIN 4 MG/1
4 TABLET ORAL EVERY MORNING
Qty: 90 TABLET | Refills: 3 | Status: SHIPPED | OUTPATIENT
Start: 2018-09-10 | End: 2018-12-06

## 2018-09-10 RX ORDER — CARVEDILOL 3.12 MG/1
3.12 TABLET ORAL 2 TIMES DAILY WITH MEALS
Qty: 180 TABLET | Refills: 3 | Status: SHIPPED | OUTPATIENT
Start: 2018-09-10 | End: 2019-03-08

## 2018-09-10 RX ORDER — AMLODIPINE BESYLATE 5 MG/1
5 TABLET ORAL 2 TIMES DAILY
Qty: 180 TABLET | Refills: 3 | Status: SHIPPED | OUTPATIENT
Start: 2018-09-10 | End: 2019-03-08

## 2018-09-18 ENCOUNTER — TELEPHONE (OUTPATIENT)
Dept: CARDIOLOGY | Facility: CLINIC | Age: 68
End: 2018-09-18

## 2018-09-18 NOTE — TELEPHONE ENCOUNTER
VM from patient stating that he received a letter from [facility] regarding his Inspira medication. Patient requesting a call back. Spoke with patient. Patient states that just after he left the message, he received a message from his pharmacy that his refill went through from his PCP office. Patient had no further questions.

## 2018-11-15 ENCOUNTER — DOCUMENTATION ONLY (OUTPATIENT)
Dept: ENDOCRINOLOGY | Facility: CLINIC | Age: 68
End: 2018-11-15

## 2018-11-15 DIAGNOSIS — I25.10 CORONARY ARTERY DISEASE INVOLVING NATIVE CORONARY ARTERY OF NATIVE HEART WITHOUT ANGINA PECTORIS: Primary | ICD-10-CM

## 2018-11-15 LAB — HBA1C MFR BLD: 6.3 % (ref 0–5.6)

## 2018-11-15 NOTE — PROGRESS NOTES
GRADE study visit     Patient is here for 45 month GRADE study visit. Patient is doing well and continues on Metformin 1000 mg bid in addition to randomized treatment of glimepiride.  He is currently taking 0.5 mg daily.  He has had no hypoglycemia and almost all readings are in range.  BP has been better on less medication.  Dealing with back pain and is tired.  Continues taking care of his young grandchildren.  He otherwise has been feeling well and in his usual state of health. Last a1c was 6.6%.      BP: 133/66, 130/68    Lab results:   A1c: 6.3%     Plan:   1. Diabetes- A1c at target. No hypoglycemia. No changes. Advised to remain on Amaryl 0.5 mg daily and not increase until seeing glucose profile x 1 week. At risk for severe hypoglycemia even on small dose of sulfonylurea.   2. Follow up in 3 months, sooner with concerns.      Lulu Cortés PA-C, MPAS   [facility]  Department of Medicine  Division of Endocrinology and Diabetes

## 2018-11-16 DIAGNOSIS — E78.5 HYPERLIPIDEMIA LDL GOAL <70: ICD-10-CM

## 2018-11-17 NOTE — TELEPHONE ENCOUNTER
"rosuvastatin (CRESTOR) 20 MG tablet  Last Written Prescription Date:  11/30/18  Last Fill Quantity: 90,  # refills: 2   Last office visit: 7/5/2018 with prescribing provider:  Rylee   Future Office Visit:   Next 5 appointments (look out 90 days)     Nov 27, 2018  1:45 PM CST   Return Visit with Grant Morris MD   [facility] ([facility])    [address]  [facility] [address] [phone] OPT 2            Dec 13, 2018 11:00 AM CST   Office Visit with Hai Mckee MD   [facility] ([facility])    [address] [id number] [phone]                     Requested Prescriptions   Pending Prescriptions Disp Refills     rosuvastatin (CRESTOR) 20 MG tablet [Pharmacy Med Name: ROSUVASTATIN CALCIUM 20 MG Tablet] 90 tablet 2     Sig: TAKE 1 TABLET EVERY DAY    Statins Protocol Passed    11/16/2018  6:22 PM       Passed - LDL on file in past 12 months    Recent Labs   Lab Test  07/05/18   0931   LDL  43            Passed - No abnormal creatine kinase in past 12 months    No lab results found.            Passed - Recent (12 mo) or future (30 days) visit within the authorizing provider's specialty    Patient had office visit in the last 12 months or has a visit in the next 30 days with authorizing provider or within the authorizing provider's specialty.  See \"Patient Info\" tab in inbasket, or \"Choose Columns\" in Meds & Orders section of the refill encounter.             Passed - Patient is age 18 or older          "

## 2018-11-19 RX ORDER — ROSUVASTATIN CALCIUM 20 MG/1
20 TABLET, COATED ORAL DAILY
Qty: 90 TABLET | Refills: 1 | Status: SHIPPED | OUTPATIENT
Start: 2018-11-19 | End: 2019-05-24

## 2018-11-19 NOTE — TELEPHONE ENCOUNTER
Prescription approved per [facility] Refill Protocol.  Gracie PARRA RN

## 2018-11-27 ENCOUNTER — OFFICE VISIT (OUTPATIENT)
Dept: CARDIOLOGY | Facility: CLINIC | Age: 68
End: 2018-11-27
Attending: INTERNAL MEDICINE
Payer: COMMERCIAL

## 2018-11-27 VITALS
BODY MASS INDEX: 22.47 KG/M2 | WEIGHT: 131.6 LBS | HEIGHT: 64 IN | OXYGEN SATURATION: 99 % | SYSTOLIC BLOOD PRESSURE: 140 MMHG | HEART RATE: 46 BPM | DIASTOLIC BLOOD PRESSURE: 62 MMHG

## 2018-11-27 DIAGNOSIS — I70.1 RENAL ARTERY STENOSIS (H): ICD-10-CM

## 2018-11-27 DIAGNOSIS — I25.10 CORONARY ARTERY DISEASE INVOLVING NATIVE CORONARY ARTERY OF NATIVE HEART WITHOUT ANGINA PECTORIS: ICD-10-CM

## 2018-11-27 DIAGNOSIS — E11.9 TYPE 2 DIABETES MELLITUS WITHOUT COMPLICATION, WITHOUT LONG-TERM CURRENT USE OF INSULIN (H): ICD-10-CM

## 2018-11-27 DIAGNOSIS — E78.5 HYPERLIPIDEMIA LDL GOAL <70: ICD-10-CM

## 2018-11-27 DIAGNOSIS — I1A.0 RESISTANT HYPERTENSION: Primary | ICD-10-CM

## 2018-11-27 DIAGNOSIS — Z95.1 S/P CABG (CORONARY ARTERY BYPASS GRAFT): ICD-10-CM

## 2018-11-27 DIAGNOSIS — I1A.0 RESISTANT HYPERTENSION: ICD-10-CM

## 2018-11-27 DIAGNOSIS — Z86.73 HISTORY OF STROKE: ICD-10-CM

## 2018-11-27 DIAGNOSIS — I71.40 AAA (ABDOMINAL AORTIC ANEURYSM) WITHOUT RUPTURE (H): ICD-10-CM

## 2018-11-27 LAB
ANION GAP SERPL CALCULATED.3IONS-SCNC: 14 MMOL/L (ref 6–17)
BUN SERPL-MCNC: 14 MG/DL (ref 7–30)
CALCIUM SERPL-MCNC: 10.2 MG/DL (ref 8.5–10.5)
CHLORIDE SERPL-SCNC: 101 MMOL/L (ref 98–107)
CHOLEST SERPL-MCNC: 138 MG/DL
CO2 SERPL-SCNC: 27 MMOL/L (ref 23–29)
CREAT SERPL-MCNC: 1.1 MG/DL (ref 0.7–1.3)
GFR SERPL CREATININE-BSD FRML MDRD: 67 ML/MIN/1.7M2
GLUCOSE SERPL-MCNC: 86 MG/DL (ref 70–105)
HDLC SERPL-MCNC: 46 MG/DL
LDLC SERPL CALC-MCNC: 62 MG/DL
NONHDLC SERPL-MCNC: 92 MG/DL
POTASSIUM SERPL-SCNC: 4 MMOL/L (ref 3.5–5.1)
SODIUM SERPL-SCNC: 138 MMOL/L (ref 136–145)
TRIGL SERPL-MCNC: 151 MG/DL

## 2018-11-27 PROCEDURE — 93000 ELECTROCARDIOGRAM COMPLETE: CPT | Performed by: INTERNAL MEDICINE

## 2018-11-27 PROCEDURE — 80048 BASIC METABOLIC PNL TOTAL CA: CPT | Performed by: INTERNAL MEDICINE

## 2018-11-27 PROCEDURE — 80061 LIPID PANEL: CPT | Performed by: INTERNAL MEDICINE

## 2018-11-27 PROCEDURE — 36415 COLL VENOUS BLD VENIPUNCTURE: CPT | Performed by: INTERNAL MEDICINE

## 2018-11-27 PROCEDURE — 99214 OFFICE O/P EST MOD 30 MIN: CPT | Performed by: INTERNAL MEDICINE
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
## 2018-11-27 NOTE — PATIENT INSTRUCTIONS
1. No changes to medications.    2. Follow up with me in 6 months.    If you have any questions or concerns, please contact my nurses at [phone].

## 2018-11-27 NOTE — LETTER
11/27/2018      Hai Mckee MD  [address]  [facility] [address]      RE: Mati Pulliam       Dear Colleague,    I had the pleasure of seeing Mati Pulliam in the [facility].    Service Date: 11/27/2018      PRIMARY CARE PROVIDER:  Hai Mckee MD      CARDIOLOGY HUANG:  Vikki Khan APRN, CNP      REASON FOR VISIT:   1.  Followup of resistant hypertension following medication changes.   2.  Followup of stable CAD, status post CABG in 2009.      HISTORY OF PRESENT ILLNESS:  Mr. Mati Pulliam is well known to me from previous visits.  I have recently shared his care with my partner, Dr. Cuate Warner, who saw the patient in 08/2018.  Mr. Pulliam was unaccompanied today.      He is a delightful 67-year-old, nonobese (BMI 23 kg/m2), never tobacco user, gentleman of [place]  ([place]), known to have:     1.  Coronary artery disease without angina, status post CABG in 2009 (LIMA to LAD, vein grafts to diagonal, OM2 and right posterolateral branch).   2.  Ischemic stroke after his bypass surgery without residual motor defect, but he does have noticeable trouble with intermittent word finding.   3.  Type 2 diabetes mellitus, excellently controlled on oral medications with HbA1c of 6.3%.   4.  Recent resistant hypertension which has normalized following several medication changes.  /62 today.  He has no side effects.   5.  Longstanding sinus bradycardia.  We have had to scale back his dosage of carvedilol to the current 3.125 mg twice daily.  His ECG done today confirms sinus bradycardia of 41 BPM with normal cardiac intervals ( milliseconds, QTc 470 milliseconds).  He is completely asymptomatic from this.   6.  Question of renal artery stenosis.  His renal ultrasound done recently evaluate his resistant hypertension showed elevated resistive indices on the right renal artery.  Dr. Warner obtained CTA, which did not show any significant stenoses.   7.  Small  infrarenal abdominal aortic aneurysm of 3.0 cm (transverse) x 2.8 cm (AP) found incidentally on a recent renal CTA.   8.  Renal cyst.      The patient is doing extremely well.  He walks in the mall 3 days a week; at other times he actively looks after his grandchildren.  No medication side effects.  Blood pressure control normalized for several months  normal.      LABORATORY STUDIES:  Total cholesterol 138, HDL 46, LDL 62, triglycerides 151 (on rosuvastatin 20 mg daily).  Sodium 138, potassium 4.0, BUN 14, creatinine 1.1, estimated GFR of 67, HbA1c 6.3, TSH 1.4.      CT abdomen 08/2018, as above.      Cardiac MRI in 08/2018, normal LVEF of 60%, no regionals, normal LVEF of 57%, no ischemia on perfusion imaging.      PHYSICAL EXAMINATION:   VITAL SIGNS: Per my note.     Physical exam is satisfactory.  He has intermittent word-finding difficulty (expressive aphasia that is since his previous stroke).  The rest of his exam is unremarkable.  No lower extremity edema.      PAST MEDICAL HISTORY:   1.  Hypertension, excellent control after medication readjustment.   2.  Mild right renal artery stenosis.   3.  Small infrarenal abdominal aortic aneurysm noted incidentally on imaging (3.0 cm).   4.  Stable CAD, status post CABG in 2009 (LIMA to LAD, vein grafts to diagonal, OM2 and right posterolateral branch).   5.  Treated hyperlipidemia.   6.  History of previous stroke with residual expressive aphasia.      ASSESSMENT AND PLAN:   1.  The patient is doing very well.  I congratulated him on his exercise program.  No changes to medications today.   2.  Follow up with me in 6 months to monitor his blood pressure.   3.  Yearly renal ultrasound to monitor his renal arterial stenosis and abdominal aortic aneurysm, as recommended by Dr. Warner.      As always, it was my pleasure to see Mr. Pulliam.  I look  forward to seeing him again.        cc:      Hai Mckee MD    [facility]   [address]   [place]  [place] [address]         [facility] FAIZAN GUALLPA MD             D: 2018   T: 2018   MT: LARRY      Name:     CORINE BILLY   MRN:      [id number]        Account:      [id number]   :      1950           Service Date: 2018      Document: [id number]         Outpatient Encounter Prescriptions as of 2018   Medication Sig Dispense Refill     amLODIPine (NORVASC) 5 MG tablet Take 1 tablet (5 mg) by mouth 2 times daily 180 tablet 3     ASPIRIN 81 MG OR TABS ONE DAILY 100 3     blood glucose (ONE TOUCH ULTRA) test strip Use to test blood sugars once daily as directed. 100 strip PRN     blood glucose monitoring (ONE TOUCH ULTRA MINI) meter device kit Use to test blood sugar once daily or as directed. 1 kit      carvedilol (COREG) 3.125 MG tablet Take 1 tablet (3.125 mg) by mouth 2 times daily (with meals) 180 tablet 3     Cholecalciferol (VITAMIN D) 2000 UNITS CAPS Take 1 capsule by mouth every other day       clopidogrel (PLAVIX) 75 MG tablet Take 1 tablet (75 mg) by mouth daily 90 tablet 2     doxazosin (CARDURA) 4 MG tablet Take 1 tablet (4 mg) by mouth every morning 90 tablet 3     eplerenone (INSPRA) 25 MG tablet Take 1 tablet (25 mg) by mouth daily 90 tablet 3     fluticasone (FLONASE) 50 MCG/ACT nasal spray Spray 1-2 sprays into both nostrils daily 1 Package 11     glimepiride (AMARYL) 1 MG tablet Take 0.5 mg by mouth every morning (before breakfast)        losartan (COZAAR) 100 MG tablet Take 1 tablet (100 mg) by mouth daily 90 tablet 2     metFORMIN (GLUCOPHAGE) 500 MG tablet Take 1,000 mg by mouth 2 times daily (with meals)       nitroglycerin (NITROSTAT) 0.4 MG sublingual tablet Place 1 tablet (0.4 mg) under the tongue See Admin Instructions for chest pain 25 tablet 3     ONETOUCH DELICA LANCETS 33G MISC 1 Device daily 100 each prn     RaNITidine HCl (ZANTAC 150 MAXIMUM STRENGTH PO) Take 150 mg by mouth 2 times daily        rosuvastatin (CRESTOR) 20 MG tablet Take 1 tablet (20 mg) by mouth  daily 90 tablet 1     salicylic acid (MEDIPLAST) 40 % MISC Apply 1 dose. topically At Bedtime Each night, Scrape or loofa the area and then soak foot for 10-15 min in warm water.  Cut plaster to fit exactly over 1 wart each.  Tape each in place for overnight and remove the next morning. 1 each 11     STUDY MEDICATION Metformin 1000 mg bid  Glimepiride 1 mg daily    Medication provided by GRADE study only.   Coordinator: Raymundo Coy -[id number]  PI: Shanon Guy -[id number]       No facility-administered encounter medications on file as of 11/27/2018.        Again, thank you for allowing me to participate in the care of your patient.      Sincerely,    Grant Morris MD     [facility]

## 2018-11-27 NOTE — MR AVS SNAPSHOT
After Visit Summary   11/27/2018    Mati Pulliam    MRN: [id number]           Patient Information     Date Of Birth          1950        Visit Information        Provider Department      11/27/2018 1:45 PM Grant Morris MD [facility]        Today's Diagnoses     Resistant hypertension    -  1    Coronary artery disease involving native coronary artery of native heart without angina pectoris        S/P CABG (coronary artery bypass graft)        Hyperlipidemia LDL goal <70          Care Instructions    1. No changes to medications.    2. Follow up with me in 6 months.    If you have any questions or concerns, please contact my nurses at [phone].            Follow-ups after your visit        Additional Services     Follow-Up with Cardiologist                 Your next 10 appointments already scheduled     Dec 13, 2018 11:00 AM CST   [facility] Visit with Hai Mckee MD   [facility]    [address] [facility] [address] [phone]           Bring a current list of meds and any records pertaining to this visit. For Physicals, please bring immunization records and any forms needing to be filled out. Please arrive 10 minutes early to complete paperwork.              Future tests that were ordered for you today     Open Future Orders        Priority Expected Expires Ordered    Follow-Up with Cardiologist Routine 5/26/2019 11/27/2019 11/27/2018    Basic metabolic panel Routine 5/26/2019 11/27/2019 11/27/2018    EKG 12-lead complete w/read - Clinics Routine 5/26/2019 11/27/2019 11/27/2018            Who to contact     If you have questions or need follow up information about today's clinic visit or your schedule please contact [facility] directly at [phone].  Normal or non-critical lab and imaging results will be communicated to you by MyChart, letter or phone  "within 4 business days after the clinic has received the results. If you do not hear from us within 7 days, please contact the clinic through [employer] or phone. If you have a critical or abnormal lab result, we will notify you by phone as soon as possible.  Submit refill requests through [employer] or call your pharmacy and they will forward the refill request to us. Please allow 3 business days for your refill to be completed.          Additional Information About Your Visit        [email or web address] Information     [employer] gives you secure access to your electronic health record. If you see a primary care provider, you can also send messages to your care team and make appointments. If you have questions, please call your primary care clinic.  If you do not have a primary care provider, please call [phone] and they will assist you.        Care EveryWhere ID     This is your Care EveryWhere ID. This could be used by other organizations to access your [facility] medical records  [id number]        Your Vitals Were     Pulse Height Pulse Oximetry BMI (Body Mass Index)          46 1.613 m (5' 3.5\") 99% 22.95 kg/m2         Blood Pressure from Last 3 Encounters:   11/27/18 140/62   08/27/18 143/71   08/23/18 159/74    Weight from Last 3 Encounters:   11/27/18 59.7 kg (131 lb 9.6 oz)   08/27/18 59.4 kg (131 lb)   08/13/18 59.1 kg (130 lb 4.8 oz)              We Performed the Following     EKG 12-lead complete w/read - Clinics     Follow-Up with Cardiologist        Primary Care Provider Office Phone # Fax #    Bhavjot MD Rylee [phone] [address]  [place] [address]        Equal Access to Services     [facility] AARON : Hadii desiree Pollard, camiloda chidi, kishorybjimmy hortaallluvia bangura. So [facility] [phone].    ATENCIÓN: Si habla español, tiene a hamm disposición servicios gratuitos de asistencia lingüística. Llame al [phone].    We comply with " applicable federal civil rights laws and [place] laws. We do not discriminate on the basis of race, color, national origin, age, disability, sex, sexual orientation, or gender identity.            Thank you!     Thank you for choosing [facility]  for your care. Our goal is always to provide you with excellent care. Hearing back from our patients is one way we can continue to improve our services. Please take a few minutes to complete the written survey that you may receive in the mail after your visit with us. Thank you!             Your Updated Medication List - Protect others around you: Learn how to safely use, store and throw away your medicines at www.disposemymeds.org.          This list is accurate as of 11/27/18  2:25 PM.  Always use your most recent med list.                   Brand Name Dispense Instructions for use Diagnosis    amLODIPine 5 MG tablet    NORVASC    180 tablet    Take 1 tablet (5 mg) by mouth 2 times daily    Renal hypertension       aspirin 81 MG tablet    ASA    100    ONE DAILY    Type II or unspecified type diabetes mellitus without mention of complication, not stated as uncontrolled       blood glucose monitoring meter device kit     1 kit    Use to test blood sugar once daily or as directed.    Type 2 diabetes, HbA1C goal < 8% (H)       blood glucose monitoring test strip    ONETOUCH ULTRA    100 strip    Use to test blood sugars once daily as directed.    Type 2 diabetes, HbA1C goal < 8% (H)       carvedilol 3.125 MG tablet    COREG    180 tablet    Take 1 tablet (3.125 mg) by mouth 2 times daily (with meals)    CAD (coronary artery disease)       clopidogrel 75 MG tablet    PLAVIX    90 tablet    Take 1 tablet (75 mg) by mouth daily    History of stroke, S/P CABG (coronary artery bypass graft)       doxazosin 4 MG tablet    CARDURA    90 tablet    Take 1 tablet (4 mg) by mouth every morning    Renal hypertension       eplerenone 25 MG tablet     INSPRA    90 tablet    Take 1 tablet (25 mg) by mouth daily    Benign essential hypertension       fluticasone 50 MCG/ACT nasal spray    FLONASE    1 Package    Spray 1-2 sprays into both nostrils daily    Cough       glimepiride 1 MG tablet    AMARYL     Take 0.5 mg by mouth every morning (before breakfast)        losartan 100 MG tablet    COZAAR    90 tablet    Take 1 tablet (100 mg) by mouth daily    Benign essential hypertension       metFORMIN 500 MG tablet    GLUCOPHAGE     Take 1,000 mg by mouth 2 times daily (with meals)        nitroGLYcerin 0.4 MG sublingual tablet    NITROSTAT    25 tablet    Place 1 tablet (0.4 mg) under the tongue See Admin Instructions for chest pain    Chronic ischemic heart disease       ONETOUCH DELICA LANCETS 33G Misc     100 each    1 Device daily    Type 2 diabetes, HbA1C goal < 8% (H)       rosuvastatin 20 MG tablet    CRESTOR    90 tablet    Take 1 tablet (20 mg) by mouth daily    Hyperlipidemia LDL goal <70       salicylic acid 40 % miscellaneous    MEDIPLAST    1 each    Apply 1 dose. topically At Bedtime Each night, Scrape or loofa the area and then soak foot for 10-15 min in warm water.  Cut plaster to fit exactly over 1 wart each.  Tape each in place for overnight and remove the next morning.    Callus of foot       STUDY MEDICATION      Metformin 1000 mg bid Glimepiride 1 mg daily  Medication provided by GRADE study only.  Coordinator: Raymundo Coy -[phone] PI: Shanon Guy -[phone]        vitamin D 2000 units Caps      Take 1 capsule by mouth every other day        ZANTAC 150 MAXIMUM STRENGTH PO      Take 150 mg by mouth 2 times daily

## 2018-11-27 NOTE — PROGRESS NOTES
Service Date: 11/27/2018      PRIMARY CARE PROVIDER:  Hai Mckee MD      CARDIOLOGY HUANG:  Vikki Khan, LIZA, CNP      REASON FOR VISIT:   1.  Followup of resistant hypertension following medication changes.   2.  Followup of stable CAD, status post CABG in 2009.      HISTORY OF PRESENT ILLNESS:    Mr. Mati Pulliam is well known to me from previous visits.  I have recently shared his care with my partner, Dr. Cuate Warner, who saw the patient in 08/2018.  Mr. Pulliam was unaccompanied today.      He is a delightful 67-year-old, nonobese (BMI 23 kg/m2), never tobacco user, gentleman of [place]  ([place]), known to have:     1.  Coronary artery disease without angina, status post CABG in 2009 (LIMA to LAD, vein grafts to diagonal, OM2 and right posterolateral branch).   2.  Ischemic stroke after his bypass surgery without residual motor defect, but he does have noticeable trouble with intermittent word finding.   3.  Type 2 diabetes mellitus, excellently controlled on oral medications with HbA1c of 6.3%.   4.  Recent resistant hypertension which has normalized following several medication changes.  /62 today.  He has no side effects.   5.  Longstanding sinus bradycardia.  We have had to scale back his dosage of carvedilol to the current 3.125 mg twice daily.  His ECG done today confirms sinus bradycardia of 41 BPM with normal cardiac intervals ( milliseconds, QTc 470 milliseconds).  He is completely asymptomatic from this.   6.  Question of renal artery stenosis.  His renal ultrasound done recently evaluate his resistant hypertension showed elevated resistive indices on the right renal artery.  Dr. Warner obtained CTA, which did not show any significant stenoses.   7.  Small infrarenal abdominal aortic aneurysm of 3.0 cm (transverse) x 2.8 cm (AP) found incidentally on a recent renal CTA.   8.  Renal cyst.      The patient is doing extremely well.  He [place] the mall 3 days a week; at other  "times he actively looks after his grandchildren.  No medication side effects.  Blood pressure control normalized for several months  normal.      Labs:  Total cholesterol 138, HDL 46, LDL 62, triglycerides 151 (on rosuvastatin 20 mg daily).  Sodium 138, potassium 4.0, BUN 14, creatinine 1.1, estimated GFR of 67, HbA1c 6.3, TSH 1.4.      CT abdomen 08/2018, as above.      Cardiac MRI in 08/2018, normal LVEF of 60%, no regionals, normal LVEF of 57%, no ischemia on perfusion imaging.      VITAL SIGNS: /62  Pulse (!) 46  Ht 1.613 m (5' 3.5\")  Wt 59.7 kg (131 lb 9.6 oz)  SpO2 99%  BMI 22.95 kg/m2. Physical exam is satisfactory.  He has intermittent word-finding difficulty (expressive aphasia that is since his previous stroke).  The rest of his exam is unremarkable.  No lower extremity edema.      PAST MEDICAL HISTORY:   1.  Hypertension, excellent control after medication readjustment.   2.  Mild right renal artery stenosis.   3.  Small infrarenal abdominal aortic aneurysm noted incidentally on imaging (3.0 cm).   4.  Stable CAD, status post CABG in 2009 (LIMA to LAD, vein grafts to diagonal, OM2 and right posterolateral branch).   5.  Treated hyperlipidemia.   6.  History of previous stroke with residual expressive aphasia.      ASSESSMENT AND PLAN:   1.  The patient is doing very well.  I congratulated him on his exercise program.  No changes to medications today.   2.  Follow up with me in 6 months to monitor his blood pressure.   3.  Yearly renal ultrasound to monitor his renal arterial stenosis and abdominal aortic aneurysm, as recommended by Dr. Warner.      As always, it was my pleasure to see Mr. Billy.  I look  forward to seeing him again.        cc:      Hai Mckee MD    [facility]   [address]   [place] [address]         [facility] FAIZAN GUALLPA MD             D: 11/27/2018   T: 11/27/2018   MT: LARRY      Name:     CORINE BILLY   MRN:      [id number]        Account:      " [id number]   :      1950           Service Date: 2018      Document: [id number]

## 2018-12-06 ENCOUNTER — OFFICE VISIT (OUTPATIENT)
Dept: FAMILY MEDICINE | Facility: CLINIC | Age: 68
End: 2018-12-06
Payer: COMMERCIAL

## 2018-12-06 VITALS
BODY MASS INDEX: 22.53 KG/M2 | HEART RATE: 41 BPM | TEMPERATURE: 97.1 F | OXYGEN SATURATION: 99 % | DIASTOLIC BLOOD PRESSURE: 60 MMHG | HEIGHT: 64 IN | WEIGHT: 132 LBS | SYSTOLIC BLOOD PRESSURE: 169 MMHG

## 2018-12-06 DIAGNOSIS — Z12.11 SPECIAL SCREENING FOR MALIGNANT NEOPLASMS, COLON: ICD-10-CM

## 2018-12-06 DIAGNOSIS — Z86.73 HISTORY OF STROKE: ICD-10-CM

## 2018-12-06 DIAGNOSIS — R80.9 MICROALBUMINURIA: ICD-10-CM

## 2018-12-06 DIAGNOSIS — E11.59 TYPE 2 DIABETES MELLITUS WITH VASCULAR DISEASE (H): Primary | ICD-10-CM

## 2018-12-06 DIAGNOSIS — Z23 NEED FOR PROPHYLACTIC VACCINATION AND INOCULATION AGAINST INFLUENZA: ICD-10-CM

## 2018-12-06 DIAGNOSIS — I10 BENIGN ESSENTIAL HYPERTENSION: ICD-10-CM

## 2018-12-06 DIAGNOSIS — I12.9 RENAL HYPERTENSION: ICD-10-CM

## 2018-12-06 DIAGNOSIS — Z95.1 S/P CABG (CORONARY ARTERY BYPASS GRAFT): ICD-10-CM

## 2018-12-06 DIAGNOSIS — Z23 NEED FOR VACCINATION: ICD-10-CM

## 2018-12-06 LAB
ERYTHROCYTE [DISTWIDTH] IN BLOOD BY AUTOMATED COUNT: 13.4 % (ref 10–15)
HCT VFR BLD AUTO: 40.8 % (ref 40–53)
HGB BLD-MCNC: 13.1 G/DL (ref 13.3–17.7)
MCH RBC QN AUTO: 29.7 PG (ref 26.5–33)
MCHC RBC AUTO-ENTMCNC: 32.1 G/DL (ref 31.5–36.5)
MCV RBC AUTO: 93 FL (ref 78–100)
PLATELET # BLD AUTO: 191 10E9/L (ref 150–450)
RBC # BLD AUTO: 4.41 10E12/L (ref 4.4–5.9)
WBC # BLD AUTO: 5.1 10E9/L (ref 4–11)

## 2018-12-06 PROCEDURE — 90472 IMMUNIZATION ADMIN EACH ADD: CPT | Performed by: INTERNAL MEDICINE

## 2018-12-06 PROCEDURE — 36415 COLL VENOUS BLD VENIPUNCTURE: CPT | Performed by: INTERNAL MEDICINE

## 2018-12-06 PROCEDURE — 99214 OFFICE O/P EST MOD 30 MIN: CPT | Mod: 25 | Performed by: INTERNAL MEDICINE

## 2018-12-06 PROCEDURE — G0008 ADMIN INFLUENZA VIRUS VAC: HCPCS | Performed by: INTERNAL MEDICINE

## 2018-12-06 PROCEDURE — 90662 IIV NO PRSV INCREASED AG IM: CPT | Performed by: INTERNAL MEDICINE

## 2018-12-06 PROCEDURE — 85027 COMPLETE CBC AUTOMATED: CPT | Performed by: INTERNAL MEDICINE

## 2018-12-06 PROCEDURE — 82607 VITAMIN B-12: CPT | Performed by: INTERNAL MEDICINE

## 2018-12-06 PROCEDURE — 90714 TD VACC NO PRESV 7 YRS+ IM: CPT | Performed by: INTERNAL MEDICINE

## 2018-12-06 PROCEDURE — 99207 C FOOT EXAM  NO CHARGE: CPT | Performed by: INTERNAL MEDICINE

## 2018-12-06 PROCEDURE — 82043 UR ALBUMIN QUANTITATIVE: CPT | Performed by: INTERNAL MEDICINE

## 2018-12-06 RX ORDER — EPLERENONE 25 MG/1
25 TABLET, FILM COATED ORAL DAILY
Qty: 90 TABLET | Refills: 3 | Status: SHIPPED | OUTPATIENT
Start: 2018-12-06 | End: 2019-03-08

## 2018-12-06 RX ORDER — LOSARTAN POTASSIUM 100 MG/1
100 TABLET ORAL DAILY
Qty: 90 TABLET | Refills: 3 | Status: SHIPPED | OUTPATIENT
Start: 2018-12-06 | End: 2019-03-08

## 2018-12-06 RX ORDER — DOXAZOSIN 4 MG/1
6 TABLET ORAL EVERY MORNING
Qty: 90 TABLET | Refills: 3 | Status: SHIPPED | OUTPATIENT
Start: 2018-12-06 | End: 2019-03-08

## 2018-12-06 RX ORDER — CLOPIDOGREL BISULFATE 75 MG/1
75 TABLET ORAL DAILY
Qty: 90 TABLET | Refills: 2 | Status: SHIPPED | OUTPATIENT
Start: 2018-12-06 | End: 2019-03-08
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
## 2018-12-06 NOTE — MR AVS SNAPSHOT
After Visit Summary   12/6/2018    Mati Pulliam    MRN: [id number]           Patient Information     Date Of Birth          1950        Visit Information        Provider Department      12/6/2018 4:00 PM Hai Mckee MD [facility]        Today's Diagnoses     Type 2 diabetes mellitus with vascular disease (H)    -  1    Benign essential hypertension        Renal hypertension        S/P CABG (coronary artery bypass graft)        History of stroke        Microalbuminuria        Special screening for malignant neoplasms, colon        Need for prophylactic vaccination and inoculation against influenza        Need for vaccination          Care Instructions    Doxazosin 6 mg daily     Repeat blood pressure next week           Follow-ups after your visit        Follow-up notes from your care team     Return in about 3 months (around 3/6/2019) for BP Recheck DM< CAD, BP Recheck.      Your next 10 appointments already scheduled     Dec 13, 2018 10:30 AM CST   Nurse Only with CS NURSE   [facility]    [address] [address]   [phone]            Mar 07, 2019 12:00 PM CST   Office Visit with Hai Mckee MD   [facility] ([facility]    [address] [facility] [address]   [phone]           Bring a current list of meds and any records pertaining to this visit. For Physicals, please bring immunization records and any forms needing to be filled out. Please arrive 10 minutes early to complete paperwork.              Future tests that were ordered for you today     Open Future Orders        Priority Expected Expires Ordered    Fecal colorectal cancer screen FIT - Future (S+30) Routine 12/27/2018 1/5/2019 12/6/2018            Who to contact     If you have questions or need follow up information about today's clinic visit or your schedule please contact [facility] directly at  "[phone].  Normal or non-critical lab and imaging results will be communicated to you by MyChart, letter or phone within 4 business days after the clinic has received the results. If you do not hear from us within 7 days, please contact the clinic through [employer] or phone. If you have a critical or abnormal lab result, we will notify you by phone as soon as possible.  Submit refill requests through [employer] or call your pharmacy and they will forward the refill request to us. Please allow 3 business days for your refill to be completed.          Additional Information About Your Visit        [facility] Information     [employer] gives you secure access to your electronic health record. If you see a primary care provider, you can also send messages to your care team and make appointments. If you have questions, please call your primary care clinic.  If you do not have a primary care provider, please call [phone] and they will assist you.        Care EveryWhere ID     This is your Care EveryWhere ID. This could be used by other organizations to access your [facility] medical records  [id number]        Your Vitals Were     Pulse Temperature Height Pulse Oximetry BMI (Body Mass Index)       41 97.1  F (36.2  C) (Oral) 5' 3.5\" (1.613 m) 99% 23.02 kg/m2        Blood Pressure from Last 3 Encounters:   12/06/18 169/60   11/27/18 140/62   08/27/18 143/71    Weight from Last 3 Encounters:   12/06/18 132 lb (59.9 kg)   11/27/18 131 lb 9.6 oz (59.7 kg)   08/27/18 131 lb (59.4 kg)              We Performed the Following     Albumin Random Urine Quantitative with Creat Ratio     CBC with platelets     Each additional admin.  (Right click and add QUANTITY)  [34966]     FLU VACCINE, INCREASED ANTIGEN, PRESV FREE, AGE 65+ [64183]     FOOT EXAM  NO CHARGE [48406.114]     TD PRSERV FREE >=7 YRS ADS IM [57295]     Vaccine Administration, Initial [94544]     Vitamin B12          Today's Medication Changes          These changes are " accurate as of 12/6/18  4:41 PM.  If you have any questions, ask your nurse or doctor.               These medicines have changed or have updated prescriptions.        Dose/Directions    doxazosin 4 MG tablet   Commonly known as:  CARDURA   This may have changed:  how much to take   Used for:  Renal hypertension   Changed by:  Hai Mckee MD        Dose:  6 mg   Take 1.5 tablets (6 mg) by mouth every morning   Quantity:  90 tablet   Refills:  3            Where to get your medicines      These medications were sent to [facility] - [place], [place] - [address]  [address], [facility] [address]     Phone:  [phone]     clopidogrel 75 MG tablet    doxazosin 4 MG tablet    eplerenone 25 MG tablet    losartan 100 MG tablet                Primary Care Provider Office Phone # Fax #    Hai Mckee [phone] [address] [facility] [address]  [facility] [place] [address]        Equal Access to Services     [facility] AH: Hadii desiree ruano hadasho Soomaali, waaxda luqadaha, qaybta kaalmada adeegyada, lluvia medleyin haycarmenn rocco guidry . So [facility] [phone].    ATENCIÓN: Si habla español, tiene a hamm disposición servicios gratuitos de asistencia lingüística. [facility] [phone].    We comply with applicable federal civil rights laws and [place] laws. We do not discriminate on the basis of race, color, national origin, age, disability, sex, sexual orientation, or gender identity.            Thank you!     Thank you for choosing [facility]  for your care. Our goal is always to provide you with excellent care. Hearing back from our patients is one way we can continue to improve our services. Please take a few minutes to complete the written survey that you may receive in the mail after your visit with us. Thank you!             Your Updated Medication List - Protect others around you: Learn how to safely use, store and throw away your medicines at  www.disposemymeds.org.          This list is accurate as of 12/6/18  4:41 PM.  Always use your most recent med list.                   Brand Name Dispense Instructions for use Diagnosis    amLODIPine 5 MG tablet    NORVASC    180 tablet    Take 1 tablet (5 mg) by mouth 2 times daily    Renal hypertension       aspirin 81 MG tablet    ASA    100    ONE DAILY    Type II or unspecified type diabetes mellitus without mention of complication, not stated as uncontrolled       blood glucose monitoring meter device kit     1 kit    Use to test blood sugar once daily or as directed.    Type 2 diabetes, HbA1C goal < 8% (H)       blood glucose monitoring test strip    ONETOUCH ULTRA    100 strip    Use to test blood sugars once daily as directed.    Type 2 diabetes, HbA1C goal < 8% (H)       carvedilol 3.125 MG tablet    COREG    180 tablet    Take 1 tablet (3.125 mg) by mouth 2 times daily (with meals)    CAD (coronary artery disease)       clopidogrel 75 MG tablet    PLAVIX    90 tablet    Take 1 tablet (75 mg) by mouth daily    History of stroke, S/P CABG (coronary artery bypass graft)       doxazosin 4 MG tablet    CARDURA    90 tablet    Take 1.5 tablets (6 mg) by mouth every morning    Renal hypertension       eplerenone 25 MG tablet    INSPRA    90 tablet    Take 1 tablet (25 mg) by mouth daily    Benign essential hypertension       fluticasone 50 MCG/ACT nasal spray    FLONASE    1 Package    Spray 1-2 sprays into both nostrils daily    Cough       glimepiride 1 MG tablet    AMARYL     Take 0.5 mg by mouth every morning (before breakfast)        losartan 100 MG tablet    COZAAR    90 tablet    Take 1 tablet (100 mg) by mouth daily    Benign essential hypertension       metFORMIN 500 MG tablet    GLUCOPHAGE     Take 1,000 mg by mouth 2 times daily (with meals)        nitroGLYcerin 0.4 MG sublingual tablet    NITROSTAT    25 tablet    Place 1 tablet (0.4 mg) under the tongue See Admin Instructions for chest pain     Chronic ischemic heart disease       ONETOUCH DELICA LANCETS 33G Misc     100 each    1 Device daily    Type 2 diabetes, HbA1C goal < 8% (H)       rosuvastatin 20 MG tablet    CRESTOR    90 tablet    Take 1 tablet (20 mg) by mouth daily    Hyperlipidemia LDL goal <70       salicylic acid 40 % miscellaneous    MEDIPLAST    1 each    Apply 1 dose. topically At Bedtime Each night, Scrape or loofa the area and then soak foot for 10-15 min in warm water.  Cut plaster to fit exactly over 1 wart each.  Tape each in place for overnight and remove the next morning.    Callus of foot       STUDY MEDICATION      Metformin 1000 mg bid Glimepiride 1 mg daily  Medication provided by GRADE study only.  Coordinator: Raymundo Coy -[id number] PI: Shanon Guy -1943        vitamin D 2000 units Caps      Take 1 capsule by mouth every other day        ZANTAC 150 MAXIMUM STRENGTH PO      Take 150 mg by mouth 2 times daily

## 2018-12-06 NOTE — PROGRESS NOTES
Injectable Influenza Immunization Documentation    1.  Is the person to be vaccinated sick today?   No    2. Does the person to be vaccinated have an allergy to a component   of the vaccine?   No  Egg Allergy Algorithm Link    3. Has the person to be vaccinated ever had a serious reaction   to influenza vaccine in the past?   No    4. Has the person to be vaccinated ever had Guillain-Barré syndrome?   No    Form completed by patient.    Prior to injection verified patient identity using patient's name and date of birth.  Due to injection administration, patient instructed to remain in clinic for 15 minutes  afterwards, and to report any adverse reaction to me immediately.    Kayy Johns ,CMA

## 2018-12-06 NOTE — NURSING NOTE
Screening Questionnaire for Adult Immunization    Are you sick today?   No   Do you have allergies to medications, food, a vaccine component or latex?   Yes   Have you ever had a serious reaction after receiving a vaccination?   No   Do you have a long-term health problem with heart disease, lung disease, asthma, kidney disease, metabolic disease (e.g. diabetes), anemia, or other blood disorder?   Yes   Do you have cancer, leukemia, HIV/AIDS, or any other immune system problem?   No   In the past 3 months, have you taken medications that affect  your immune system, such as prednisone, other steroids, or anticancer drugs; drugs for the treatment of rheumatoid arthritis, Crohn s disease, or psoriasis; or have you had radiation treatments?   No   Have you had a seizure, or a brain or other nervous system problem?   No   During the past year, have you received a transfusion of blood or blood     products, or been given immune (gamma) globulin or antiviral drug?   No   For women: Are you pregnant or is there a chance you could become        pregnant during the next month?   No   Have you received any vaccinations in the past 4 weeks?   No     Immunization questionnaire was positive for at least one answer.  Notified .        Per orders of Dr. Mckee, injection of Td given by Kayy Johns. Patient instructed to remain in clinic for 15 minutes afterwards, and to report any adverse reaction to me immediately.       Screening performed by Kayy Johns on 12/6/2018 at 4:46 PM.

## 2018-12-07 LAB
CREAT UR-MCNC: 24 MG/DL
MICROALBUMIN UR-MCNC: 87 MG/L
MICROALBUMIN/CREAT UR: 363.87 MG/G CR (ref 0–17)
VIT B12 SERPL-MCNC: 304 PG/ML (ref 193–986)

## 2018-12-20 ENCOUNTER — ALLIED HEALTH/NURSE VISIT (OUTPATIENT)
Dept: NURSING | Facility: CLINIC | Age: 68
End: 2018-12-20
Payer: COMMERCIAL

## 2018-12-20 VITALS
HEART RATE: 52 BPM | HEIGHT: 64 IN | DIASTOLIC BLOOD PRESSURE: 76 MMHG | WEIGHT: 130 LBS | SYSTOLIC BLOOD PRESSURE: 128 MMHG | BODY MASS INDEX: 22.2 KG/M2 | TEMPERATURE: 97.4 F

## 2018-12-20 DIAGNOSIS — I10 HTN (HYPERTENSION): Primary | ICD-10-CM

## 2018-12-20 PROCEDURE — 99207 ZZC NO CHARGE NURSE ONLY: CPT

## 2018-12-20 ASSESSMENT — MIFFLIN-ST. JEOR: SCORE: 1272.74

## 2018-12-20 NOTE — PROGRESS NOTES
"Mati Pulliam is a 68 year old patient who comes in today for a Blood Pressure check.  Initial BP:  /76 (BP Location: Right arm, Cuff Size: Adult Regular)   Pulse 52   Temp 97.4  F (36.3  C) (Oral)   Ht 1.613 m (5' 3.5\")   Wt 59 kg (130 lb)   BMI 22.67 kg/m       52    Patient states he has not been taking 6 mg daily of Doxazosin as Dr. Mckee advised on 12-6-2018 visit.  States his BP was good today, does he need to be on the 6 mg dose?    Triaged by BENTLEY Love, see phone note to his cardiologist.    Cristy Roth MA        "

## 2019-02-08 ENCOUNTER — APPOINTMENT (OUTPATIENT)
Dept: CT IMAGING | Facility: CLINIC | Age: 69
End: 2019-02-08
Attending: PHYSICIAN ASSISTANT
Payer: COMMERCIAL

## 2019-02-08 ENCOUNTER — APPOINTMENT (OUTPATIENT)
Dept: GENERAL RADIOLOGY | Facility: CLINIC | Age: 69
End: 2019-02-08
Attending: PHYSICIAN ASSISTANT
Payer: COMMERCIAL

## 2019-02-08 ENCOUNTER — HOSPITAL ENCOUNTER (EMERGENCY)
Facility: CLINIC | Age: 69
Discharge: HOME | End: 2019-02-08
Admitting: PHYSICIAN ASSISTANT
Payer: COMMERCIAL

## 2019-02-08 VITALS
TEMPERATURE: 97.6 F | HEIGHT: 64 IN | OXYGEN SATURATION: 98 % | BODY MASS INDEX: 22.2 KG/M2 | RESPIRATION RATE: 16 BRPM | SYSTOLIC BLOOD PRESSURE: 154 MMHG | HEART RATE: 55 BPM | DIASTOLIC BLOOD PRESSURE: 89 MMHG | WEIGHT: 130 LBS

## 2019-02-08 DIAGNOSIS — M54.50 ACUTE LEFT-SIDED LOW BACK PAIN WITHOUT SCIATICA: ICD-10-CM
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
## 2019-02-08 PROCEDURE — 99284 EMERGENCY DEPT VISIT MOD MDM: CPT | Mod: 25

## 2019-02-08 PROCEDURE — 70450 CT HEAD/BRAIN W/O DYE: CPT

## 2019-02-08 PROCEDURE — 25000132 ZZH RX MED GY IP 250 OP 250 PS 637: Mod: GY | Performed by: PHYSICIAN ASSISTANT

## 2019-02-08 PROCEDURE — A9270 NON-COVERED ITEM OR SERVICE: HCPCS | Mod: GY | Performed by: PHYSICIAN ASSISTANT

## 2019-02-08 PROCEDURE — 72100 X-RAY EXAM L-S SPINE 2/3 VWS: CPT

## 2019-02-08 RX ORDER — ACETAMINOPHEN 325 MG/1
975 TABLET ORAL ONCE
Status: COMPLETED | OUTPATIENT
Start: 2019-02-08 | End: 2019-02-08

## 2019-02-08 RX ORDER — LIDOCAINE 4 G/G
1 PATCH TOPICAL ONCE
Status: DISCONTINUED | OUTPATIENT
Start: 2019-02-08 | End: 2019-02-08 | Stop reason: HOSPADM

## 2019-02-08 RX ADMIN — LIDOCAINE 1 PATCH: 560 PATCH PERCUTANEOUS; TOPICAL; TRANSDERMAL at 16:12

## 2019-02-08 RX ADMIN — ACETAMINOPHEN 975 MG: 325 TABLET, FILM COATED ORAL at 16:13

## 2019-02-08 ASSESSMENT — ENCOUNTER SYMPTOMS
ROS GI COMMENTS: NO INCONTINENCE
HEADACHES: 0
NECK PAIN: 0
SEIZURES: 0
WEAKNESS: 0
BACK PAIN: 1
NUMBNESS: 0
CONFUSION: 0

## 2019-02-08 ASSESSMENT — MIFFLIN-ST. JEOR: SCORE: 1280.68
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
## 2019-02-08 NOTE — DISCHARGE INSTRUCTIONS
Tylenol to help at home with back pain.  You can  over-the-counter topical lidocaine patches also help with discomfort.  Return to the ED for any changing worsening symptoms, confusion, visual changes, weakness, speech difficulty, new concerns.  Follow-up with primary care provider within the next 2 days for recheck.    Discharge Instructions  Head Injury    You have been seen today for a head injury. You were checked for serious problems, like bleeding on the brain, but these problems cannot always be found right away.  Due to this risk, you should not be alone for 24 hours after your injury.  Follow up with your regular physician in 2 days. If you are taking a blood thinner, such as aspirin, Pradaxa  (dabigatran), Coumadin  (warfarin), or Plavix  (clopidogrel), you are at especially high risk for immediate or delayed bleeding, and need to re-check with a physician in 24 hours, or sooner if any of the symptoms below happen.     Return to the Emergency Department if:  You are confused, have amnesia, or you are not acting right.  Your headache gets worse or you start to have a really bad headache even with your recommended treatment plan.  You vomit more than once.  You have a convulsion or seizure.  You have trouble walking.  You have weakness or paralysis in an arm or a leg.  You have blood or fluid coming from your ears or nose.  You have new symptoms or anything that worries you.    Sleeping:  It is okay for you to sleep, but someone should wake you up as instructed by your doctor, and someone should check on you at your usual time to wake up.     Activity:  Do not drive for at least 24 hours.  Do not drive if you have dizzy spells or trouble concentrating, or remembering things.  Do not return to any contact sports until cleared by your regular doctor.     Follow-up:  It is very important that you make an appointment with your clinic and go to the appointment.  If you do not follow-up with your regular  doctor, it may result in missing an important development which could result in permanent injury or disability and/or lasting pain.  If there is any problem keeping your appointment, call your doctor or return to the Emergency Department.    MORE INFORMATION:    Concussion:  A concussion is a minor head injury that may cause temporary problems with the way your brain works.  Some symptoms include:  confusion, amnesia, nausea and vomiting, dizziness, fatigue, memory or concentration problems, irritability and sleep problems.    CT Scans: Your evaluation today may have included a CT scan (CAT scan) to look for things like bleeding or a skull fracture (break).  CT scans involve radiation and too many CT scans can cause serious health problems like cancer, especially in children.  Because of this, your doctor may not have ordered a CT scan today if they think you are at low risk for a serious or life threatening problem.    If you were given a prescription for medicine here today, be sure to read all of the information (including the package insert) that comes with your prescription.  This will include important information about the medicine, its side effects, and any warnings that you need to know about.  The pharmacist who fills the prescription can provide more information and answer questions you may have about the medicine.  If you have questions or concerns that the pharmacist cannot address, please call or return to the Emergency Department.     Opioid Medication Information    Pain medications are among the most commonly prescribed medicines, so we are including this information for all our patients. If you did not receive pain medication or get a prescription for pain medicine, you can ignore it.     You may have been given a prescription for an opioid (narcotic) pain medicine and/or have received a pain medicine while here in the Emergency Department. These medicines can make you drowsy or impaired. You must  not drive, operate dangerous equipment, or engage in any other dangerous activities while taking these medications. If you drive while taking these medications, you could be arrested for DUI, or driving under the influence. Do not drink any alcohol while you are taking these medications.     Opioid pain medications can cause addiction. If you have a history of chemical dependency of any type, you are at a higher risk of becoming addicted to pain medications.  Only take these prescribed medications to treat your pain when all other options have been tried. Take it for as short a time and as few doses as possible. Store your pain pills in a secure place, as they are frequently stolen and provide a dangerous opportunity for children or visitors in your house to start abusing these powerful medications. We will not replace any lost or stolen medicine.  As soon as your pain is better, you should flush all your remaining medication.     Many prescription pain medications contain Tylenol  (acetaminophen), including Vicodin , Tylenol #3 , Norco , Lortab , and Percocet .  You should not take any extra pills of Tylenol  if you are using these prescription medications or you can get very sick.  Do not ever take more than 3000 mg of acetaminophen in any 24 hour period.    All opioids tend to cause constipation. Drink plenty of water and eat foods that have a lot of fiber, such as fruits, vegetables, prune juice, apple juice and high fiber cereal.  Take a laxative if you don?t move your bowels at least every other day. Miralax , Milk of Magnesia, Colace , or Senna  can be used to keep you regular.      Remember that you can always come back to the Emergency Department if you are not able to see your regular doctor in the amount of time listed above, if you get any new symptoms, or if there is anything that worries you.

## 2019-02-08 NOTE — ED AVS SNAPSHOT
Emergency Department  [address] [place] [id number]  Phone:  [phone]  Fax:  [phone]                                    Mati Pulliam   MRN: [id number]    Department:   Emergency Department   Date of Visit:  2/8/2019           After Visit Summary Signature Page    I have received my discharge instructions, and my questions have been answered. I have discussed any challenges I see with this plan with the nurse or doctor.    ..........................................................................................................................................  Patient/Patient Representative Signature      ..........................................................................................................................................  Patient Representative Print Name and Relationship to Patient    ..................................................               ................................................  Date                                   Time    ..........................................................................................................................................  Reviewed by Signature/Title    ...................................................              ..............................................  Date                                               Time          22EPIC Rev 08/18

## 2019-02-11 ENCOUNTER — OFFICE VISIT (OUTPATIENT)
Dept: FAMILY MEDICINE | Facility: CLINIC | Age: 69
End: 2019-02-11
Payer: COMMERCIAL

## 2019-02-11 VITALS
DIASTOLIC BLOOD PRESSURE: 63 MMHG | HEIGHT: 64 IN | WEIGHT: 131 LBS | TEMPERATURE: 97.5 F | SYSTOLIC BLOOD PRESSURE: 131 MMHG | BODY MASS INDEX: 22.36 KG/M2 | HEART RATE: 57 BPM | OXYGEN SATURATION: 97 %
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
## 2019-02-11 DIAGNOSIS — M54.50 ACUTE LEFT-SIDED LOW BACK PAIN WITHOUT SCIATICA: Primary | ICD-10-CM

## 2019-02-11 PROCEDURE — 99214 OFFICE O/P EST MOD 30 MIN: CPT | Performed by: INTERNAL MEDICINE

## 2019-02-11 RX ORDER — LIDOCAINE 50 MG/G
1 PATCH TOPICAL EVERY 24 HOURS
Qty: 30 PATCH | Refills: 3 | Status: SHIPPED | OUTPATIENT
Start: 2019-02-11 | End: 2019-09-13

## 2019-02-11 RX ORDER — LIDOCAINE 50 MG/G
1 PATCH TOPICAL EVERY 24 HOURS
Qty: 30 PATCH | Refills: 3 | Status: SHIPPED | OUTPATIENT
Start: 2019-02-11 | End: 2019-02-11

## 2019-02-11 ASSESSMENT — MIFFLIN-ST. JEOR: SCORE: 1285.21
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
## 2019-02-12 ENCOUNTER — TELEPHONE (OUTPATIENT)
Dept: FAMILY MEDICINE | Facility: CLINIC | Age: 69
End: 2019-02-12
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
## 2019-02-15 ENCOUNTER — THERAPY VISIT (OUTPATIENT)
Dept: CHIROPRACTIC MEDICINE | Facility: CLINIC | Age: 69
End: 2019-02-15
Payer: COMMERCIAL

## 2019-02-15 DIAGNOSIS — M99.04 SOMATIC DYSFUNCTION OF SACRAL REGION: ICD-10-CM

## 2019-02-15 DIAGNOSIS — M54.42 ACUTE LEFT-SIDED LOW BACK PAIN WITH LEFT-SIDED SCIATICA: ICD-10-CM
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
## 2019-02-15 DIAGNOSIS — M99.02 THORACIC SEGMENT DYSFUNCTION: ICD-10-CM

## 2019-02-15 DIAGNOSIS — M62.838 MUSCLE SPASM: ICD-10-CM

## 2019-02-15 DIAGNOSIS — M53.3 SACRAL PAIN: ICD-10-CM

## 2019-02-15 DIAGNOSIS — M99.03 SOMATIC DYSFUNCTION OF LUMBAR REGION: Primary | ICD-10-CM

## 2019-02-15 PROCEDURE — 99203 OFFICE O/P NEW LOW 30 MIN: CPT | Mod: 25 | Performed by: CHIROPRACTOR

## 2019-02-15 PROCEDURE — 98941 CHIROPRACT MANJ 3-4 REGIONS: CPT | Mod: AT | Performed by: CHIROPRACTOR

## 2019-02-15 PROCEDURE — 97035 APP MDLTY 1+ULTRASOUND EA 15: CPT | Performed by: CHIROPRACTOR

## 2019-02-18 PROBLEM — M62.838 MUSCLE SPASM: Status: ACTIVE | Noted: 2019-02-18

## 2019-02-18 PROBLEM — M99.03 SOMATIC DYSFUNCTION OF LUMBAR REGION: Status: ACTIVE | Noted: 2019-02-18

## 2019-02-18 PROBLEM — M99.02 THORACIC SEGMENT DYSFUNCTION: Status: ACTIVE | Noted: 2019-02-18

## 2019-02-18 PROBLEM — M53.3 SACRAL PAIN: Status: ACTIVE | Noted: 2019-02-18

## 2019-02-18 PROBLEM — M99.04 SOMATIC DYSFUNCTION OF SACRAL REGION: Status: ACTIVE | Noted: 2019-02-18
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
## 2019-02-18 NOTE — TELEPHONE ENCOUNTER
Central Prior Authorization Team   Phone: [phone]      PA Initiation    Medication: Lidocaine 5% patch  Insurance Company: [employer] - Phone [phone] Fax [phone]  Pharmacy Filling the Rx: [id number] - [place], [place] - [address]  Filling Pharmacy Phone: [phone]  Filling Pharmacy Fax:    Start Date: 2/18/2019

## 2019-02-18 NOTE — TELEPHONE ENCOUNTER
PRIOR AUTHORIZATION DENIED    Medication: Lidocaine 5% patch    Denial Date: 2/18/2019    Denial Rational:  Lidoderm patches are only covered with the diagnosis of post-herpetic neuralgia, diabetic neuropathy, or cancer related pain. It will not be covered for the associated diagnosis.       Appeal Information:    If you would like to appeal, please supply P/A team with a letter of medical necessity with clinical reason.

## 2019-02-28 LAB
ALBUMIN URINE MG/G CR: 300 MG/G CREATININE
CREAT SERPL-MCNC: 0.9 MG/DL
HBA1C MFR BLD: 6.4 % (ref 0–5.6)
LDLC SERPL CALC-MCNC: 43 MG/DL

## 2019-03-01 ENCOUNTER — THERAPY VISIT (OUTPATIENT)
Dept: CHIROPRACTIC MEDICINE | Facility: CLINIC | Age: 69
End: 2019-03-01
Payer: COMMERCIAL

## 2019-03-01 DIAGNOSIS — M99.02 THORACIC SEGMENT DYSFUNCTION: ICD-10-CM

## 2019-03-01 DIAGNOSIS — M99.03 SOMATIC DYSFUNCTION OF LUMBAR REGION: Primary | ICD-10-CM

## 2019-03-01 DIAGNOSIS — M99.04 SOMATIC DYSFUNCTION OF SACRAL REGION: ICD-10-CM

## 2019-03-01 DIAGNOSIS — M53.3 SACRAL PAIN: ICD-10-CM

## 2019-03-01 DIAGNOSIS — M62.838 MUSCLE SPASM: ICD-10-CM
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
## 2019-03-01 DIAGNOSIS — M54.42 ACUTE LEFT-SIDED LOW BACK PAIN WITH LEFT-SIDED SCIATICA: ICD-10-CM

## 2019-03-01 PROCEDURE — 97035 APP MDLTY 1+ULTRASOUND EA 15: CPT | Performed by: CHIROPRACTOR

## 2019-03-01 PROCEDURE — 98941 CHIROPRACT MANJ 3-4 REGIONS: CPT | Mod: AT | Performed by: CHIROPRACTOR
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
## 2019-03-06 ENCOUNTER — THERAPY VISIT (OUTPATIENT)
Dept: PHYSICAL THERAPY | Facility: CLINIC | Age: 69
End: 2019-03-06
Payer: COMMERCIAL

## 2019-03-06 ENCOUNTER — THERAPY VISIT (OUTPATIENT)
Dept: CHIROPRACTIC MEDICINE | Facility: CLINIC | Age: 69
End: 2019-03-06
Payer: COMMERCIAL

## 2019-03-06 DIAGNOSIS — M99.03 SOMATIC DYSFUNCTION OF LUMBAR REGION: Primary | ICD-10-CM
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
## 2019-03-06 DIAGNOSIS — M53.3 SACRAL PAIN: ICD-10-CM

## 2019-03-06 DIAGNOSIS — M54.42 ACUTE LEFT-SIDED LOW BACK PAIN WITH LEFT-SIDED SCIATICA: ICD-10-CM

## 2019-03-06 DIAGNOSIS — M62.838 MUSCLE SPASM: ICD-10-CM

## 2019-03-06 DIAGNOSIS — M54.41 LEFT-SIDED LOW BACK PAIN WITH RIGHT-SIDED SCIATICA: ICD-10-CM

## 2019-03-06 DIAGNOSIS — M99.02 THORACIC SEGMENT DYSFUNCTION: ICD-10-CM

## 2019-03-06 DIAGNOSIS — M99.04 SOMATIC DYSFUNCTION OF SACRAL REGION: ICD-10-CM

## 2019-03-06 PROCEDURE — 98941 CHIROPRACT MANJ 3-4 REGIONS: CPT | Mod: AT | Performed by: CHIROPRACTOR

## 2019-03-06 PROCEDURE — 97035 APP MDLTY 1+ULTRASOUND EA 15: CPT | Performed by: CHIROPRACTOR

## 2019-03-06 PROCEDURE — 97161 PT EVAL LOW COMPLEX 20 MIN: CPT | Mod: GP | Performed by: PHYSICAL THERAPIST

## 2019-03-06 PROCEDURE — 97110 THERAPEUTIC EXERCISES: CPT | Mod: GP | Performed by: PHYSICAL THERAPIST
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
## 2019-03-07 ENCOUNTER — DOCUMENTATION ONLY (OUTPATIENT)
Dept: ENDOCRINOLOGY | Facility: CLINIC | Age: 69
End: 2019-03-07

## 2019-03-07 NOTE — PROGRESS NOTES
Patient was seen for GRADE study on 2/2819.    GRADE study visit     Patient is here for 48 month GRADE study visit. Patient is doing well and continues on Metformin 1000 mg bid in addition to randomized treatment of glimepiride.  He is currently taking 0.5 mg daily.  He has had no hypoglycemia and almost all readings are in range.  BP has been better on less medication.  Dealing with back pain and is tired.  Continues taking care of his young grandchildren.  He otherwise has been feeling well and in his usual state of health..         Plan:   1. Diabetes- A1c at target. No hypoglycemia. No changes. Advised to remain on Amaryl 0.5 mg daily and not increase until seeing glucose profile x 1 week. At risk for severe hypoglycemia even on small dose of sulfonylurea.   2. Follow up in 3 months, sooner with concerns.      Marcie Guy MD    [facility]  Department of Medicine  Division of Endocrinology and Diabetes

## 2019-03-08 ENCOUNTER — OFFICE VISIT (OUTPATIENT)
Dept: FAMILY MEDICINE | Facility: CLINIC | Age: 69
End: 2019-03-08
Payer: COMMERCIAL

## 2019-03-08 VITALS
SYSTOLIC BLOOD PRESSURE: 139 MMHG | HEIGHT: 64 IN | DIASTOLIC BLOOD PRESSURE: 68 MMHG | HEART RATE: 55 BPM | BODY MASS INDEX: 22.53 KG/M2 | OXYGEN SATURATION: 98 % | TEMPERATURE: 97.5 F | WEIGHT: 132 LBS

## 2019-03-08 DIAGNOSIS — I71.40 AAA (ABDOMINAL AORTIC ANEURYSM) WITHOUT RUPTURE (H): ICD-10-CM

## 2019-03-08 DIAGNOSIS — I10 BENIGN ESSENTIAL HYPERTENSION: ICD-10-CM

## 2019-03-08 DIAGNOSIS — J32.0 CHRONIC MAXILLARY SINUSITIS: ICD-10-CM

## 2019-03-08 DIAGNOSIS — Z95.1 S/P CABG (CORONARY ARTERY BYPASS GRAFT): ICD-10-CM

## 2019-03-08 DIAGNOSIS — I25.9 CHRONIC ISCHEMIC HEART DISEASE: ICD-10-CM

## 2019-03-08 DIAGNOSIS — E11.59 TYPE 2 DIABETES MELLITUS WITH VASCULAR DISEASE (H): Primary | ICD-10-CM

## 2019-03-08 DIAGNOSIS — R80.9 MICROALBUMINURIA: ICD-10-CM

## 2019-03-08 DIAGNOSIS — Z12.11 SPECIAL SCREENING FOR MALIGNANT NEOPLASMS, COLON: ICD-10-CM

## 2019-03-08 DIAGNOSIS — Z86.73 HISTORY OF STROKE: ICD-10-CM

## 2019-03-08 PROCEDURE — 99214 OFFICE O/P EST MOD 30 MIN: CPT | Performed by: INTERNAL MEDICINE

## 2019-03-08 RX ORDER — NITROGLYCERIN 0.4 MG/1
0.4 TABLET SUBLINGUAL SEE ADMIN INSTRUCTIONS
Qty: 25 TABLET | Refills: 3 | Status: SHIPPED | OUTPATIENT
Start: 2019-03-08 | End: 2020-03-13

## 2019-03-08 RX ORDER — AMLODIPINE BESYLATE 5 MG/1
5 TABLET ORAL 2 TIMES DAILY
Qty: 180 TABLET | Refills: 3 | Status: SHIPPED | OUTPATIENT
Start: 2019-03-08 | End: 2020-04-09

## 2019-03-08 RX ORDER — CARVEDILOL 3.12 MG/1
3.12 TABLET ORAL 2 TIMES DAILY WITH MEALS
Qty: 180 TABLET | Refills: 3 | Status: SHIPPED | OUTPATIENT
Start: 2019-03-08 | End: 2020-03-13

## 2019-03-08 RX ORDER — FLUTICASONE PROPIONATE 50 MCG
1 SPRAY, SUSPENSION (ML) NASAL DAILY
Qty: 16 G | Refills: 11 | Status: SHIPPED | OUTPATIENT
Start: 2019-03-08 | End: 2019-09-13

## 2019-03-08 RX ORDER — CLOPIDOGREL BISULFATE 75 MG/1
75 TABLET ORAL DAILY
Qty: 90 TABLET | Refills: 3 | Status: SHIPPED | OUTPATIENT
Start: 2019-03-08 | End: 2020-04-07

## 2019-03-08 RX ORDER — DOXAZOSIN 2 MG/1
2 TABLET ORAL 2 TIMES DAILY
Qty: 180 TABLET | Refills: 3 | Status: SHIPPED | OUTPATIENT
Start: 2019-03-08 | End: 2019-06-10

## 2019-03-08 RX ORDER — LOSARTAN POTASSIUM 100 MG/1
100 TABLET ORAL DAILY
Qty: 90 TABLET | Refills: 3 | Status: SHIPPED | OUTPATIENT
Start: 2019-03-08 | End: 2020-03-13

## 2019-03-08 RX ORDER — GLIMEPIRIDE 1 MG/1
0.5 TABLET ORAL
Qty: 45 TABLET | Refills: 11 | Status: SHIPPED | OUTPATIENT
Start: 2019-03-08 | End: 2020-03-13

## 2019-03-08 RX ORDER — EPLERENONE 25 MG/1
25 TABLET, FILM COATED ORAL DAILY
Qty: 90 TABLET | Refills: 3 | Status: SHIPPED | OUTPATIENT
Start: 2019-03-08 | End: 2020-03-13

## 2019-03-08 ASSESSMENT — MIFFLIN-ST. JEOR: SCORE: 1289.75
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
## 2019-03-11 ENCOUNTER — TELEPHONE (OUTPATIENT)
Dept: FAMILY MEDICINE | Facility: CLINIC | Age: 69
End: 2019-03-11

## 2019-03-11 NOTE — TELEPHONE ENCOUNTER
Prior Authorization Retail Medication Request    Medication/Dose: Doxazosin mesylate 2 mg  ICD code (if different than what is on RX):  I10  Previously Tried and Failed:  Avalide, Clonidine, Hyzaar  Rationale:  Patient stable with current medication    Insurance Name:  ESI1  Insurance ID:  [id number]      Pharmacy Information (if different than what is on RX)  Name:  [facility]  Phone:  [phone]

## 2019-03-14 ENCOUNTER — THERAPY VISIT (OUTPATIENT)
Dept: PHYSICAL THERAPY | Facility: CLINIC | Age: 69
End: 2019-03-14
Payer: COMMERCIAL

## 2019-03-14 DIAGNOSIS — M54.41 LEFT-SIDED LOW BACK PAIN WITH RIGHT-SIDED SCIATICA: ICD-10-CM

## 2019-03-14 PROCEDURE — 97110 THERAPEUTIC EXERCISES: CPT | Mod: GP | Performed by: PHYSICAL THERAPIST

## 2019-03-15 ENCOUNTER — THERAPY VISIT (OUTPATIENT)
Dept: CHIROPRACTIC MEDICINE | Facility: CLINIC | Age: 69
End: 2019-03-15
Payer: COMMERCIAL

## 2019-03-15 DIAGNOSIS — M53.3 SACRAL PAIN: ICD-10-CM

## 2019-03-15 DIAGNOSIS — M99.03 SOMATIC DYSFUNCTION OF LUMBAR REGION: Primary | ICD-10-CM

## 2019-03-15 DIAGNOSIS — M62.838 MUSCLE SPASM: ICD-10-CM

## 2019-03-15 DIAGNOSIS — M99.02 THORACIC SEGMENT DYSFUNCTION: ICD-10-CM

## 2019-03-15 DIAGNOSIS — M99.04 SOMATIC DYSFUNCTION OF SACRAL REGION: ICD-10-CM

## 2019-03-15 DIAGNOSIS — M54.42 ACUTE LEFT-SIDED LOW BACK PAIN WITH LEFT-SIDED SCIATICA: ICD-10-CM
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
## 2019-03-15 PROCEDURE — 97810 ACUP 1/> WO ESTIM 1ST 15 MIN: CPT | Mod: GA | Performed by: CHIROPRACTOR

## 2019-03-15 PROCEDURE — 98941 CHIROPRACT MANJ 3-4 REGIONS: CPT | Mod: AT | Performed by: CHIROPRACTOR
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
## 2019-03-15 NOTE — TELEPHONE ENCOUNTER
Central Prior Authorization Team   Phone: [phone]    PA Initiation    Medication: Doxazosin mesylate 2 mg  Insurance Company: [employer] - Phone [phone] Fax [phone]  Pharmacy Filling the Rx: [facility] - [place], [place] - [address] [place] [address]  Filling Pharmacy Phone: [phone]  Filling Pharmacy Fax: [phone]  Start Date: 3/15/2019

## 2019-03-15 NOTE — TELEPHONE ENCOUNTER
Prior Authorization Approval    Authorization Effective Date: 2/13/2019  Authorization Expiration Date: 3/14/2022  Medication: Doxazosin mesylate 2 mg-APPROVED  Approved Dose/Quantity:    Reference #: [id number]   Insurance Company: [employer] - Phone [phone] Fax [phone]  Expected CoPay:       CoPay Card Available:      Foundation Assistance Needed:    Which Pharmacy is filling the prescription (Not needed for infusion/clinic administered): [facility] - [address]  Pharmacy Notified: Yes  Patient Notified: Yes

## 2019-03-20 ENCOUNTER — THERAPY VISIT (OUTPATIENT)
Dept: CHIROPRACTIC MEDICINE | Facility: CLINIC | Age: 69
End: 2019-03-20
Payer: COMMERCIAL

## 2019-03-20 DIAGNOSIS — M99.02 THORACIC SEGMENT DYSFUNCTION: ICD-10-CM
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
## 2019-03-20 DIAGNOSIS — M99.03 SOMATIC DYSFUNCTION OF LUMBAR REGION: Primary | ICD-10-CM

## 2019-03-20 DIAGNOSIS — M62.838 MUSCLE SPASM: ICD-10-CM

## 2019-03-20 DIAGNOSIS — M99.04 SOMATIC DYSFUNCTION OF SACRAL REGION: ICD-10-CM

## 2019-03-20 DIAGNOSIS — M53.3 SACRAL PAIN: ICD-10-CM

## 2019-03-20 DIAGNOSIS — M54.42 ACUTE LEFT-SIDED LOW BACK PAIN WITH LEFT-SIDED SCIATICA: ICD-10-CM

## 2019-03-20 PROCEDURE — 98941 CHIROPRACT MANJ 3-4 REGIONS: CPT | Mod: AT | Performed by: CHIROPRACTOR

## 2019-03-20 PROCEDURE — 97810 ACUP 1/> WO ESTIM 1ST 15 MIN: CPT | Mod: GA | Performed by: CHIROPRACTOR
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
## 2019-03-21 ENCOUNTER — THERAPY VISIT (OUTPATIENT)
Dept: PHYSICAL THERAPY | Facility: CLINIC | Age: 69
End: 2019-03-21
Payer: COMMERCIAL

## 2019-03-21 DIAGNOSIS — M54.41 LEFT-SIDED LOW BACK PAIN WITH RIGHT-SIDED SCIATICA: ICD-10-CM

## 2019-03-21 PROCEDURE — 97110 THERAPEUTIC EXERCISES: CPT | Mod: GP | Performed by: PHYSICAL THERAPIST

## 2019-03-27 ENCOUNTER — THERAPY VISIT (OUTPATIENT)
Dept: CHIROPRACTIC MEDICINE | Facility: CLINIC | Age: 69
End: 2019-03-27
Payer: COMMERCIAL

## 2019-03-27 DIAGNOSIS — M53.3 SACRAL PAIN: ICD-10-CM
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
## 2019-03-27 DIAGNOSIS — M99.03 SOMATIC DYSFUNCTION OF LUMBAR REGION: Primary | ICD-10-CM

## 2019-03-27 DIAGNOSIS — M99.04 SOMATIC DYSFUNCTION OF SACRAL REGION: ICD-10-CM

## 2019-03-27 DIAGNOSIS — M62.838 MUSCLE SPASM: ICD-10-CM

## 2019-03-27 DIAGNOSIS — M99.02 THORACIC SEGMENT DYSFUNCTION: ICD-10-CM

## 2019-03-27 DIAGNOSIS — M54.42 ACUTE LEFT-SIDED LOW BACK PAIN WITH LEFT-SIDED SCIATICA: ICD-10-CM

## 2019-03-27 PROCEDURE — 98941 CHIROPRACT MANJ 3-4 REGIONS: CPT | Mod: AT | Performed by: CHIROPRACTOR

## 2019-03-27 PROCEDURE — 97810 ACUP 1/> WO ESTIM 1ST 15 MIN: CPT | Mod: GA | Performed by: CHIROPRACTOR

## 2019-03-27 PROCEDURE — 97110 THERAPEUTIC EXERCISES: CPT | Performed by: CHIROPRACTOR

## 2019-04-03 ENCOUNTER — THERAPY VISIT (OUTPATIENT)
Dept: CHIROPRACTIC MEDICINE | Facility: CLINIC | Age: 69
End: 2019-04-03
Payer: COMMERCIAL

## 2019-04-03 DIAGNOSIS — M99.04 SOMATIC DYSFUNCTION OF SACRAL REGION: ICD-10-CM

## 2019-04-03 DIAGNOSIS — M53.3 SACRAL PAIN: ICD-10-CM

## 2019-04-03 DIAGNOSIS — M54.42 ACUTE LEFT-SIDED LOW BACK PAIN WITH LEFT-SIDED SCIATICA: ICD-10-CM

## 2019-04-03 DIAGNOSIS — M62.838 MUSCLE SPASM: ICD-10-CM

## 2019-04-03 DIAGNOSIS — M99.02 THORACIC SEGMENT DYSFUNCTION: ICD-10-CM
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
## 2019-04-03 DIAGNOSIS — M99.03 SOMATIC DYSFUNCTION OF LUMBAR REGION: Primary | ICD-10-CM

## 2019-04-03 PROCEDURE — 98941 CHIROPRACT MANJ 3-4 REGIONS: CPT | Mod: AT | Performed by: CHIROPRACTOR

## 2019-04-03 PROCEDURE — 97810 ACUP 1/> WO ESTIM 1ST 15 MIN: CPT | Mod: GA | Performed by: CHIROPRACTOR
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
## 2019-04-05 ENCOUNTER — THERAPY VISIT (OUTPATIENT)
Dept: PHYSICAL THERAPY | Facility: CLINIC | Age: 69
End: 2019-04-05
Payer: COMMERCIAL

## 2019-04-05 DIAGNOSIS — M54.41 LEFT-SIDED LOW BACK PAIN WITH RIGHT-SIDED SCIATICA: ICD-10-CM

## 2019-04-05 PROCEDURE — 97110 THERAPEUTIC EXERCISES: CPT | Mod: GP | Performed by: PHYSICAL THERAPIST

## 2019-04-12 ENCOUNTER — THERAPY VISIT (OUTPATIENT)
Dept: CHIROPRACTIC MEDICINE | Facility: CLINIC | Age: 69
End: 2019-04-12
Payer: COMMERCIAL

## 2019-04-12 DIAGNOSIS — M62.838 MUSCLE SPASM: ICD-10-CM

## 2019-04-12 DIAGNOSIS — M99.04 SOMATIC DYSFUNCTION OF SACRAL REGION: ICD-10-CM

## 2019-04-12 DIAGNOSIS — M53.3 SACRAL PAIN: ICD-10-CM
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
## 2019-04-12 DIAGNOSIS — M99.03 SOMATIC DYSFUNCTION OF LUMBAR REGION: Primary | ICD-10-CM

## 2019-04-12 DIAGNOSIS — M99.02 THORACIC SEGMENT DYSFUNCTION: ICD-10-CM

## 2019-04-12 DIAGNOSIS — M54.42 ACUTE LEFT-SIDED LOW BACK PAIN WITH LEFT-SIDED SCIATICA: ICD-10-CM

## 2019-04-12 PROCEDURE — 97810 ACUP 1/> WO ESTIM 1ST 15 MIN: CPT | Mod: GA | Performed by: CHIROPRACTOR

## 2019-04-12 PROCEDURE — 98941 CHIROPRACT MANJ 3-4 REGIONS: CPT | Mod: AT | Performed by: CHIROPRACTOR
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
## 2019-04-17 ENCOUNTER — THERAPY VISIT (OUTPATIENT)
Dept: CHIROPRACTIC MEDICINE | Facility: CLINIC | Age: 69
End: 2019-04-17
Payer: COMMERCIAL

## 2019-04-17 DIAGNOSIS — M99.03 SOMATIC DYSFUNCTION OF LUMBAR REGION: Primary | ICD-10-CM

## 2019-04-17 DIAGNOSIS — M99.02 THORACIC SEGMENT DYSFUNCTION: ICD-10-CM

## 2019-04-17 DIAGNOSIS — M99.04 SOMATIC DYSFUNCTION OF SACRAL REGION: ICD-10-CM
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
## 2019-04-17 DIAGNOSIS — M62.838 MUSCLE SPASM: ICD-10-CM

## 2019-04-17 DIAGNOSIS — M53.3 SACRAL PAIN: ICD-10-CM

## 2019-04-17 DIAGNOSIS — M54.42 ACUTE LEFT-SIDED LOW BACK PAIN WITH LEFT-SIDED SCIATICA: ICD-10-CM

## 2019-04-17 PROCEDURE — 98941 CHIROPRACT MANJ 3-4 REGIONS: CPT | Mod: AT | Performed by: CHIROPRACTOR

## 2019-04-17 PROCEDURE — 97810 ACUP 1/> WO ESTIM 1ST 15 MIN: CPT | Mod: GA | Performed by: CHIROPRACTOR
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
## 2019-05-15 ENCOUNTER — THERAPY VISIT (OUTPATIENT)
Dept: CHIROPRACTIC MEDICINE | Facility: CLINIC | Age: 69
End: 2019-05-15
Payer: COMMERCIAL

## 2019-05-15 DIAGNOSIS — M54.42 ACUTE LEFT-SIDED LOW BACK PAIN WITH LEFT-SIDED SCIATICA: ICD-10-CM
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
## 2019-05-15 DIAGNOSIS — M99.03 SOMATIC DYSFUNCTION OF LUMBAR REGION: Primary | ICD-10-CM

## 2019-05-15 DIAGNOSIS — M99.04 SOMATIC DYSFUNCTION OF SACRAL REGION: ICD-10-CM

## 2019-05-15 DIAGNOSIS — M53.3 SACRAL PAIN: ICD-10-CM

## 2019-05-15 DIAGNOSIS — M99.02 THORACIC SEGMENT DYSFUNCTION: ICD-10-CM

## 2019-05-15 PROCEDURE — 97810 ACUP 1/> WO ESTIM 1ST 15 MIN: CPT | Mod: GA | Performed by: CHIROPRACTOR

## 2019-05-15 PROCEDURE — 98941 CHIROPRACT MANJ 3-4 REGIONS: CPT | Mod: AT | Performed by: CHIROPRACTOR
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
## 2019-05-24 DIAGNOSIS — E78.5 HYPERLIPIDEMIA LDL GOAL <70: ICD-10-CM

## 2019-05-24 RX ORDER — ROSUVASTATIN CALCIUM 20 MG/1
TABLET, COATED ORAL
Qty: 90 TABLET | Refills: 2 | Status: SHIPPED | OUTPATIENT
Start: 2019-05-24 | End: 2020-02-24

## 2019-05-24 NOTE — TELEPHONE ENCOUNTER
"rosuvastatin (CRESTOR) 20 MG tablet    Last Written Prescription Date:  11/19/2018  Last Fill Quantity: 90,  # refills: 1   Last office visit: 3/8/2019 with prescribing provider:  Rylee   Future Office Visit:  09/08/2019    Requested Prescriptions   Pending Prescriptions Disp Refills     rosuvastatin (CRESTOR) 20 MG tablet [Pharmacy Med Name: Rosuvastatin Calcium Oral Tablet 20 MG] 90 tablet 0     Sig: TAKE ONE TABLET BY MOUTH ONE TIME DAILY       [facility] Protocol Passed - 5/24/2019  2:15 PM        Passed - LDL on file in past 12 months     Recent Labs   Lab Test 02/28/19   LDL 43             Passed - No abnormal creatine kinase in past 12 months     No lab results found.             Passed - Recent (12 mo) or future (30 days) visit within the authorizing provider's specialty     Patient had office visit in the last 12 months or has a visit in the next 30 days with authorizing provider or within the authorizing provider's specialty.  See \"Patient Info\" tab in inbasket, or \"Choose Columns\" in Meds & Orders section of the refill encounter.              Passed - Medication is active on med list        Passed - Patient is age 18 or older          "

## 2019-05-24 NOTE — TELEPHONE ENCOUNTER
Prescription approved per [facility] [facility].  Gracie PARRA RN

## 2019-06-06 ENCOUNTER — DOCUMENTATION ONLY (OUTPATIENT)
Dept: ENDOCRINOLOGY | Facility: CLINIC | Age: 69
End: 2019-06-06

## 2019-06-06 LAB — HBA1C MFR BLD: 6.6 % (ref 0–5.6)

## 2019-06-06 NOTE — PROGRESS NOTES
GRADE study visit     Patient is here for 51 month GRADE study visit. Patient is doing well and continues on Metformin 1000 mg bid in addition to randomized treatment of glimepiride.  He is currently taking 0.5 mg daily.  He has had no hypoglycemia and almost all readings are in range.  BP has been better on less medication.  Continues taking care of his young grandchildren.  He otherwise has been feeling well and in his usual state of health. Last a1c was 6.4%.      BP: 139/57    Lab results:   A1c: 6.6%     Plan:   1. Diabetes- A1c at target. No hypoglycemia. No changes. Advised to remain on Amaryl 0.5 mg daily and not increase until seeing glucose profile x 1 week. At risk for severe hypoglycemia even on small dose of sulfonylurea.   2. Follow up in 3 months, sooner with concerns.      Lulu Cortés PA-C, MPAS   [facility]  Department of Medicine  Division of Endocrinology and Diabetes

## 2019-06-07 ENCOUNTER — TELEPHONE (OUTPATIENT)
Dept: CARDIOLOGY | Facility: CLINIC | Age: 69
End: 2019-06-07

## 2019-06-07 DIAGNOSIS — I25.10 CORONARY ARTERY DISEASE INVOLVING NATIVE CORONARY ARTERY OF NATIVE HEART WITHOUT ANGINA PECTORIS: ICD-10-CM

## 2019-06-07 DIAGNOSIS — Z95.1 S/P CABG (CORONARY ARTERY BYPASS GRAFT): ICD-10-CM

## 2019-06-07 DIAGNOSIS — E78.5 HYPERLIPIDEMIA LDL GOAL <70: ICD-10-CM

## 2019-06-07 DIAGNOSIS — I1A.0 RESISTANT HYPERTENSION: ICD-10-CM

## 2019-06-07 LAB
ANION GAP SERPL CALCULATED.3IONS-SCNC: 10.8 MMOL/L (ref 6–17)
BUN SERPL-MCNC: 17 MG/DL (ref 7–30)
CALCIUM SERPL-MCNC: 9.7 MG/DL (ref 8.5–10.5)
CHLORIDE SERPL-SCNC: 99 MMOL/L (ref 98–107)
CO2 SERPL-SCNC: 27 MMOL/L (ref 23–29)
CREAT SERPL-MCNC: 1.24 MG/DL (ref 0.7–1.3)
GFR SERPL CREATININE-BSD FRML MDRD: 58 ML/MIN/{1.73_M2}
GLUCOSE SERPL-MCNC: 118 MG/DL (ref 70–105)
POTASSIUM SERPL-SCNC: 3.8 MMOL/L (ref 3.5–5.1)
SODIUM SERPL-SCNC: 133 MMOL/L (ref 136–145)

## 2019-06-07 PROCEDURE — 80048 BASIC METABOLIC PNL TOTAL CA: CPT | Performed by: INTERNAL MEDICINE

## 2019-06-07 PROCEDURE — 36415 COLL VENOUS BLD VENIPUNCTURE: CPT | Performed by: INTERNAL MEDICINE

## 2019-06-07 NOTE — TELEPHONE ENCOUNTER
Dr. Morris OV 6-10-19.   BMP done 6-7-19       Na K BUN Creat GFR   6-7-19 133 3.8 17 1.24 58   2-28-19 -- -- -- 0.9 --   11-27-18  138 4.0 14 1.10 67         Labs to be reviewed at OV

## 2019-06-10 ENCOUNTER — OFFICE VISIT (OUTPATIENT)
Dept: CARDIOLOGY | Facility: CLINIC | Age: 69
End: 2019-06-10
Payer: COMMERCIAL

## 2019-06-10 VITALS
HEART RATE: 42 BPM | BODY MASS INDEX: 22.28 KG/M2 | OXYGEN SATURATION: 99 % | SYSTOLIC BLOOD PRESSURE: 142 MMHG | DIASTOLIC BLOOD PRESSURE: 70 MMHG | HEIGHT: 64 IN | WEIGHT: 130.5 LBS

## 2019-06-10 DIAGNOSIS — E78.5 HYPERLIPIDEMIA LDL GOAL <70: ICD-10-CM

## 2019-06-10 DIAGNOSIS — Z86.73 HISTORY OF STROKE: ICD-10-CM

## 2019-06-10 DIAGNOSIS — I10 BENIGN ESSENTIAL HYPERTENSION: Primary | ICD-10-CM

## 2019-06-10 DIAGNOSIS — Z95.1 STATUS POST CORONARY ARTERY BYPASS GRAFT: ICD-10-CM

## 2019-06-10 DIAGNOSIS — I25.10 CORONARY ARTERY DISEASE INVOLVING NATIVE CORONARY ARTERY OF NATIVE HEART WITHOUT ANGINA PECTORIS: ICD-10-CM

## 2019-06-10 DIAGNOSIS — I71.43 ANEURYSM OF INFRARENAL ABDOMINAL AORTA (H): ICD-10-CM

## 2019-06-10 PROBLEM — M53.3 SACRAL PAIN: Status: RESOLVED | Noted: 2019-02-18 | Resolved: 2019-06-10

## 2019-06-10 PROCEDURE — 99214 OFFICE O/P EST MOD 30 MIN: CPT | Performed by: INTERNAL MEDICINE

## 2019-06-10 RX ORDER — DOXAZOSIN 4 MG/1
4 TABLET ORAL EVERY MORNING
Qty: 100 TABLET | Refills: 4 | Status: SHIPPED | OUTPATIENT
Start: 2019-06-10 | End: 2019-09-13

## 2019-06-10 RX ORDER — DOXAZOSIN 2 MG/1
2 TABLET ORAL AT BEDTIME
Qty: 100 TABLET | Refills: 4 | Status: SHIPPED | OUTPATIENT
Start: 2019-06-10 | End: 2019-09-13

## 2019-06-10 ASSESSMENT — MIFFLIN-ST. JEOR: SCORE: 1282.94
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
## 2019-06-10 NOTE — PROGRESS NOTES
Service Date: 06/10/2019      PRIMARY CARE AND REFERRING PROVIDER:  Dr. Hai Mckee       VASCULAR CARDIOLOGIST:  Dr. Cuate Warner      REASON FOR VISIT:  Followup of resistant hypertension, CAD and infrarenal abdominal aortic aneurysm.      HISTORY OF PRESENT ILLNESS:    Balta Pulliam is well known to me from previous visits.  He was unaccompanied today.  He is a very pleasant 68-year-old gentleman, non-obese (BMI 22 kg/m2), retired  of [employer] ( [employer]).      Balta is known to have:   1.  CAD without recurrent angina status post CABG in 2009 (LIMA to LAD, vein grafts to diagonal, obtuse marginal 2 and right posterolateral branch). He has been angina-free since. His pre-surgery angina symptom was chest tightness.   2.  Ischemic stroke when recovering from his bypass surgery.  No residual motor defects but he has noticeable dysarthria and trouble with word finding.   3.  Type 2 diabetes mellitus.  Excellent control with HbA1c of 6.4% on medications.   4.  Resistant hypertension.  This had been an issue for which he initially saw me.  With multiple medical changes, his blood pressure has vastly improved without side effects.  Today, it was 142/70 mmHg with a known bradycardia of 42 BPM (on carvedilol 3.125 mg b.i.d.).   5.  Longstanding sinus bradycardia.  This is the reason we had to scale back his carvedilol to his current 3.125 mg b.i.d. dosage.  Completely asymptomatic.     6.  Question of renal artery stenosis.  His renal ultrasound done last year suggested elevated resistive indices on the right renal artery.  Referred to Dr. Warner who obtained a CTA which did not show any significant renal artery stenosis.   7.  Small infrarenal abdominal aortic aneurysm 3.0 x 2.8 cm.  Incidental finding on recent renal CTA.  Dr. Warner advised him to have yearly followup with an ultrasound.   8.  Renal cysts.  Yearly ultrasound followup.      Balta has no symptoms.  He is taking his medications, walks regularly.  He  was very kind to share pictures of his family. His first son adopted 2 twin boys, ages 8 years, from [place] last year.  Balta is closely involved in their care and enjoys very joyful caring for them.      DIAGNOSTIC DATA:    Labs: Total cholesterol 138, HDL 46, LDL 43, triglycerides 151 (on rosuvastatin 20 mg daily).  Sodium 133, potassium 3.8, BUN 17, creatinine 1.2 with an estimated GFR of 58.  Hemoglobin A1c 6.4%.  Hemoglobin 13.1.  TSH 1.35.      PHYSICAL EXAMINATION:   VITAL SIGNS:  /70, pulse 42 per minute, weight 130 pounds or 59.2 kg, BMI 22.4 kg/m2.   CHEST:  Shows a well-healed midline sternotomy scar.     CARDIOVASCULAR:  Regular heart sounds, normal JVP.     EXTREMITIES:  No lower extremity edema.     NEUROLOGIC:  Dysarthria as residual effects from his previous stroke.      ASSESSMENT AND PLAN:    Balta is asymptomatic.  The 2 issues are his blood pressure is still slightly elevated at 142/70.  I think with all his multiple comorbidities, his goal should be less than 130/80 mmHg.  His creatinine has also gone up slightly from being normal to 1.2.  This would be another reason for tighter control of his blood pressure.  He is on eplerenone 25 mg daily and his serum potassium is satisfactory at 3.8.  I confirmed with him that he does not use NSAIDs.      1. I recommend increasing his doxazosin from 2 mg b.i.d. to 4 mg in the morning and 2 mg in the evening.  Continue established amlodipine 5 mg 2 times daily, carvedilol 3.125 mg 2 times daily, eplerenone 25 mg daily, losartan 100 mg daily. He is also going to see Dr. Mckee in September when his BMP and blood pressure will be reviewed.  Prescription for doxazosin renewed.   2. His lipid panel is at goal.  Continue rosuvastatin 20 mg daily.   3. I note he is on dual antiplatelet therapy with aspirin 81 mg and clopidogrel 75 mg due to his previous stroke.  He has not had any bleeding issues.   4.  Follow up in 1 year with pre-visit labs and renal ultrasound  (for surveillance of his AAA and renal cysts).       It was my pleasure to visit with this nice gentleman.  I look forward to seeing him again.         ELLY GUALLPA MD             D: 06/10/2019   T: 06/10/2019   MT: MITALI      Name:     CORINE BILLY   MRN:      [id number]        Account:      [id number]   :      1950           Service Date: 06/10/2019      Document: [id number]

## 2019-06-10 NOTE — LETTER
6/10/2019      Hai Mckee MD  [address]  [facility] [address]      RE: Mati Pulliam       Dear Colleague,    I had the pleasure of seeing Mati Pulliam in the [facility].    Service Date: 06/10/2019      PRIMARY CARE AND REFERRING PROVIDER:  Dr. Hai Mckee       VASCULAR CARDIOLOGIST:  Dr. Cuate Warner      REASON FOR VISIT:  Followup of resistant hypertension, CAD and infrarenal abdominal aortic aneurysm.      HISTORY OF PRESENT ILLNESS:  Balta Pulliam is well known to me from previous visits.  He was unaccompanied today.  He is a very pleasant 68-year-old gentleman, non-obese (BMI 22 kg/m2), retired  of [employer] ( [employer]).      aBlta is known to have:   1.  CAD without recurrent angina after undergoing a CABG in 2009 (LIMA to LAD, vein grafts to diagonal, obtuse marginal 2 and right posterolateral branch).  His pre-surgery angina symptom was chest tightness.  He has been angina-free since.   2.  Ischemic stroke when recovering from his bypass surgery.  No residual motor defects but he has noticeable dysarthria and trouble with word finding.   3.  Type 2 diabetes mellitus.  Excellent control with HbA1c of 6.4% on medications.   4.  Resistant hypertension.  This had been an issue for which he initially saw me.  With multiple medical changes, his blood pressure has vastly improved without side effects.  Today, it was 142/70 mmHg with a known bradycardia of 42 BPM (on carvedilol 3.125 mg b.i.d.).   5.  Longstanding sinus bradycardia.  This is the reason we had to scale back his carvedilol to his current 3.125 mg b.i.d. dosage.  Completely asymptomatic.     6.  Question of renal artery stenosis.  His renal ultrasound done last year suggested elevated resistive indices on the right renal artery.  Referred to Dr. Warner who obtained a CTA which did not show any significant renal artery stenosis.   7.  Small infrarenal abdominal aortic aneurysm 3.0 x 2.8 cm.   Incidental finding on recent renal CTA.  Dr. Warner advised him to have yearly followup with an ultrasound.   8.  Renal cysts.  Yearly ultrasound followup.      Balta has no symptoms.  He is taking his medications, walks regularly.  He was very kind to share pictures of his family.  His first son adopted 2 twin boys, ages 8 years, from [place] last year.  Balta was closely involved in their care and finds this very joyful caring for them.      DIAGNOSTIC DATA:  Total cholesterol 138, HDL 46, LDL 43, triglycerides 151 (on rosuvastatin 20 mg daily).  Sodium 133, potassium 3.8, BUN 17, creatinine 1.2 with an estimated GFR of 58.  Hemoglobin A1c 6.4%.  Hemoglobin 13.1.  TSH 1.35.      PHYSICAL EXAMINATION:   VITAL SIGNS:  /70, pulse 42 per minute, weight 130 pounds or 59.2 kg, BMI 22.4 kg/m2.   CHEST:  Shows a well-healed midline sternotomy scar.     CARDIOVASCULAR:  Regular heart sounds, normal JVP.     EXTREMITIES:  No lower extremity edema.     NEUROLOGIC:  Dysarthria as residual effects from his previous stroke.      ASSESSMENT AND PLAN:  Balta is asymptomatic.  The 2 issues are his blood pressure is still slightly elevated at 142/70.  I think with all his multiple comorbidities, his goal should be less than 130/80 mmHg.  His creatinine has also gone up slightly from being normal to 1.2.  This would be another reason for tighter control of his blood pressure.  He is on eplerenone 25 mg daily but his serum potassium is satisfactory at 3.8.  I confirmed with him that he does not use NSAIDs.      I recommend increasing his doxazosin from 2 mg b.i.d. to 4 mg in the morning and 2 mg in the evening.  Continue established amlodipine 5 mg 2 times daily, carvedilol 3.125 mg 2 times daily, eplerenone 25 mg daily, losartan 100 mg daily.      His lipid panel is at goal.  Continue rosuvastatin 20 mg daily.      I note he is on dual antiplatelet therapy with aspirin 81 mg and clopidogrel 75 mg due to his previous stroke.  He has not  had any bleeding issues.      Follow up in 1 year with pre-visit labs and renal ultrasound (for surveillance of his AAA and renal cysts).  He is also going to see Dr. Mckee in September when his BMP and blood pressure will be reviewed.  Prescription for doxazosin renewed.      It was my pleasure to visit with this nice gentleman.  I look forward to seeing him again.         ELLY GUALLPA MD             D: 06/10/2019   T: 06/10/2019   MT: MITALI      Name:     CORINE BILLY   MRN:      [id number]        Account:      [id number]   :      1950           Service Date: 06/10/2019      Document: [id number]         Outpatient Encounter Medications as of 6/10/2019   Medication Sig Dispense Refill     amLODIPine (NORVASC) 5 MG tablet Take 1 tablet (5 mg) by mouth 2 times daily 180 tablet 3     ASPIRIN 81 MG OR TABS ONE DAILY (Patient taking differently: prn) 100 3     blood glucose (ONE TOUCH ULTRA) test strip Use to test blood sugars once daily as directed. 100 strip PRN     blood glucose monitoring (ONE TOUCH ULTRA MINI) meter device kit Use to test blood sugar once daily or as directed. 1 kit      carvedilol (COREG) 3.125 MG tablet Take 1 tablet (3.125 mg) by mouth 2 times daily (with meals) 180 tablet 3     Cholecalciferol (VITAMIN D) 2000 UNITS CAPS Take 1 capsule by mouth every other day       clopidogrel (PLAVIX) 75 MG tablet Take 1 tablet (75 mg) by mouth daily 90 tablet 3     doxazosin (CARDURA) 2 MG tablet Take 1 tablet (2 mg) by mouth At Bedtime 100 tablet 4     doxazosin (CARDURA) 4 MG tablet Take 1 tablet (4 mg) by mouth every morning 100 tablet 4     eplerenone (INSPRA) 25 MG tablet Take 1 tablet (25 mg) by mouth daily 90 tablet 3     fluticasone (FLONASE) 50 MCG/ACT nasal spray Spray 1-2 sprays into both nostrils daily 1 Package 11     glimepiride (AMARYL) 1 MG tablet Take 0.5 tablets (0.5 mg) by mouth every morning (before breakfast) 45 tablet 11     lidocaine (LIDODERM) 5 % patch Place 1 patch  onto the skin every 24 hours 30 patch 3     losartan (COZAAR) 100 MG tablet Take 1 tablet (100 mg) by mouth daily 90 tablet 3     metFORMIN (GLUCOPHAGE) 500 MG tablet Take 1,000 mg by mouth 2 times daily (with meals)       nitroGLYcerin (NITROSTAT) 0.4 MG sublingual tablet Place 1 tablet (0.4 mg) under the tongue See Admin Instructions for chest pain 25 tablet 3     ONETOUCH DELICA LANCETS 33G MISC 1 Device daily 100 each prn     RaNITidine HCl (ZANTAC 150 MAXIMUM STRENGTH PO) Take 150 mg by mouth 2 times daily        rosuvastatin (CRESTOR) 20 MG tablet TAKE ONE TABLET BY MOUTH ONE TIME DAILY  90 tablet 2     salicylic acid (MEDIPLAST) 40 % MISC Apply 1 dose. topically At Bedtime Each night, Scrape or loofa the area and then soak foot for 10-15 min in warm water.  Cut plaster to fit exactly over 1 wart each.  Tape each in place for overnight and remove the next morning. 1 each 11     STUDY MEDICATION Metformin 1000 mg bid  Glimepiride 1 mg daily    Medication provided by GRADE study only.   Coordinator: Raymundo Coy -[id number]  PI: Shanon Guy -[id number]       fluticasone (FLONASE) 50 MCG/ACT nasal spray Spray 1 spray into both nostrils daily (Patient not taking: Reported on 6/10/2019) 16 g 11     [DISCONTINUED] doxazosin (CARDURA) 2 MG tablet Take 1 tablet (2 mg) by mouth 2 times daily 180 tablet 3     No facility-administered encounter medications on file as of 6/10/2019.      Again, thank you for allowing me to participate in the care of your patient.      Sincerely,    Grant Morris MD     [facility]

## 2019-06-10 NOTE — PATIENT INSTRUCTIONS
MEDICATION CHANGES:  1.  Increase doxazosin dosage.  Take 4 mg in the morning and 2 mg in the evening.  New prescriptions were completed.  2.  No changes to other medications.    ADDITIONAL TESTING AND FOLLOW-UP:  1.  When you see Dr. Mckee in September, please monitor your kidney blood test and check blood pressure.  Your goal blood pressure will be less than 130/80 mmHg.  2.  Abdominal ultrasound to look at your kidney cysts and small abdominal aneurysm in approximately 6-9 months, as advised by Dr. Warner.  3.  I will plan on seeing you back in 1 year.    If you have any questions or concerns, please contact my nurses at [phone].
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
## 2019-08-15 ENCOUNTER — DOCUMENTATION ONLY (OUTPATIENT)
Dept: ENDOCRINOLOGY | Facility: CLINIC | Age: 69
End: 2019-08-15

## 2019-08-15 LAB
HBA1C MFR BLD: 6.5 % (ref 0–5.6)
MICROALBUMIN - QUEST: 162.26

## 2019-08-15 NOTE — PROGRESS NOTES
GRADE study visit     Patient is here for 54 month GRADE study visit. Patient is doing well and continues on Metformin 1000 mg bid in addition to randomized treatment of glimepiride.  He is currently taking 0.5 mg daily.  He has had no hypoglycemia and almost all readings are in range.  BP has been better on less medication.  Continues taking care of his young grandchildren.  He traveled to [facility] and did not take Amaryl, as instructed, as he can drop quite low.  He has been feeling well and in his usual state of health. Last a1c was 6.6%.      BP: 142/70    Lab results:   A1c: 6.5%     Plan:   1. Diabetes- A1c at target. No hypoglycemia. No changes. Advised to remain on Amaryl 0.5 mg daily. Agree he should skip it if he will be physically active.   2. Follow up in 3 months, sooner with concerns.      Lulu Cortés PA-C, MPAS   [facility]  Department of Medicine  Division of Endocrinology and Diabetes

## 2019-08-28 PROBLEM — M54.41 LEFT-SIDED LOW BACK PAIN WITH RIGHT-SIDED SCIATICA: Status: RESOLVED | Noted: 2019-03-06 | Resolved: 2019-08-28

## 2019-09-13 ENCOUNTER — THERAPY VISIT (OUTPATIENT)
Dept: OCCUPATIONAL THERAPY | Facility: CLINIC | Age: 69
End: 2019-09-13
Payer: COMMERCIAL

## 2019-09-13 ENCOUNTER — OFFICE VISIT (OUTPATIENT)
Dept: FAMILY MEDICINE | Facility: CLINIC | Age: 69
End: 2019-09-13
Payer: COMMERCIAL

## 2019-09-13 VITALS
DIASTOLIC BLOOD PRESSURE: 71 MMHG | TEMPERATURE: 97.8 F | SYSTOLIC BLOOD PRESSURE: 137 MMHG | BODY MASS INDEX: 22.53 KG/M2 | WEIGHT: 132 LBS | OXYGEN SATURATION: 99 % | HEIGHT: 64 IN | HEART RATE: 49 BPM

## 2019-09-13 DIAGNOSIS — Z95.1 S/P CABG (CORONARY ARTERY BYPASS GRAFT): ICD-10-CM

## 2019-09-13 DIAGNOSIS — M77.9 TENDONITIS: ICD-10-CM

## 2019-09-13 DIAGNOSIS — I10 BENIGN ESSENTIAL HYPERTENSION: ICD-10-CM

## 2019-09-13 DIAGNOSIS — E11.59 TYPE 2 DIABETES MELLITUS WITH OTHER CIRCULATORY COMPLICATION, WITHOUT LONG-TERM CURRENT USE OF INSULIN (H): Primary | ICD-10-CM

## 2019-09-13 DIAGNOSIS — E78.5 HYPERLIPIDEMIA LDL GOAL <70: ICD-10-CM

## 2019-09-13 DIAGNOSIS — Z12.5 SCREENING PSA (PROSTATE SPECIFIC ANTIGEN): ICD-10-CM

## 2019-09-13 DIAGNOSIS — R80.9 MICROALBUMINURIA: ICD-10-CM

## 2019-09-13 DIAGNOSIS — M79.642 PAIN OF LEFT HAND: ICD-10-CM

## 2019-09-13 DIAGNOSIS — Z12.11 SPECIAL SCREENING FOR MALIGNANT NEOPLASMS, COLON: ICD-10-CM

## 2019-09-13 PROCEDURE — 97110 THERAPEUTIC EXERCISES: CPT | Mod: GO | Performed by: OCCUPATIONAL THERAPIST

## 2019-09-13 PROCEDURE — 97760 ORTHOTIC MGMT&TRAING 1ST ENC: CPT | Mod: GO | Performed by: OCCUPATIONAL THERAPIST

## 2019-09-13 PROCEDURE — 99214 OFFICE O/P EST MOD 30 MIN: CPT | Performed by: INTERNAL MEDICINE

## 2019-09-13 PROCEDURE — 99207 C PAF COMPLETED  NO CHARGE: CPT | Mod: 25 | Performed by: INTERNAL MEDICINE

## 2019-09-13 PROCEDURE — 97165 OT EVAL LOW COMPLEX 30 MIN: CPT | Mod: GO | Performed by: OCCUPATIONAL THERAPIST

## 2019-09-13 RX ORDER — DOXAZOSIN 4 MG/1
6 TABLET ORAL EVERY MORNING
Qty: 180 TABLET | Refills: 4 | COMMUNITY
Start: 2019-09-13 | End: 2020-09-22 | Stop reason: ALTCHOICE

## 2019-09-13 RX ORDER — DOXAZOSIN 4 MG/1
4 TABLET ORAL 2 TIMES DAILY
Qty: 180 TABLET | Refills: 3 | Status: SHIPPED | OUTPATIENT
Start: 2019-09-13 | End: 2020-09-22

## 2019-09-13 ASSESSMENT — MIFFLIN-ST. JEOR: SCORE: 1289.75

## 2019-09-13 NOTE — PROGRESS NOTES
Subjective     Mati Pulliam is a 68 year old male who presents to clinic today for the following health issues:    HPI   Patient has been having some pain in the left hand and the left foot  He is otherwise doing well and blood sugars have been good as below  Diabetes Follow-up      How often are you checking your blood sugar? One time daily    What time of day are you checking your blood sugars (select all that apply)?  Before meals    Have you had any blood sugars above 200?  No    Have you had any blood sugars below 70?  No    What symptoms do you notice when your blood sugar is low?  Shaky, Dizzy, Weak, Lethargy and Other: sweaty    What concerns do you have today about your diabetes? Other: Pain on bottom of the left foot.     Do you have any of these symptoms? (Select all that apply)  No numbness or tingling in feet.  No redness, sores or blisters on feet.  No complaints of excessive thirst.  No reports of blurry vision.  No significant changes to weight.     Have you had a diabetic eye exam in the last 12 months? Yes- Date of last eye exam: 10/2018    BP Readings from Last 2 Encounters:   09/13/19 137/71   06/10/19 142/70     Hemoglobin A1C (%)   Date Value   08/15/2019 6.5 (A)   06/06/2019 6.6 (A)     LDL Cholesterol Calculated (mg/dL)   Date Value   02/28/2019 43   11/27/2018 62       Diabetes Management Resources  Hypertension Follow-up      Do you check your blood pressure regularly outside of the clinic? No     Are you following a low salt diet? No    Are your blood pressures ever more than 140 on the top number (systolic) OR more   than 90 on the bottom number (diastolic), for example 140/90? No      How many servings of fruits and vegetables do you eat daily?  2-3    On average, how many sweetened beverages do you drink each day (soda, juice, sweet tea, etc)?   0    How many days per week do you miss taking your medication? 0                Reviewed and updated as needed this visit by  "Provider  Tobacco         Review of Systems         Objective    /71   Pulse (!) 49   Temp 97.8  F (36.6  C) (Tympanic)   Ht 1.626 m (5' 4\")   Wt 59.9 kg (132 lb)   SpO2 99%   BMI 22.66 kg/m    Body mass index is 22.66 kg/m .  Physical Exam   GENERAL: healthy, alert and no distress  NECK: no adenopathy, no asymmetry, masses, or scars and thyroid normal to palpation  RESP: lungs clear to auscultation - no rales, rhonchi or wheezes  CV: regular rate and rhythm, normal S1 S2, no S3 or S4, no murmur, click or rub, no peripheral edema and peripheral pulses strong  ABDOMEN: soft, nontender, no hepatosplenomegaly, no masses and bowel sounds normal  MS: no gross musculoskeletal defects noted, no edema  Foot exam shows no ulceration, no neuropathy, microfilament well felt. Skin on the feet not dry.  Pulses in the feet well felt.  preulcerative callus left 3rd toe plantar aspect        Assessment & Plan     Mati was seen today for diabetes and hypertension.    Diagnoses and all orders for this visit:    Type 2 diabetes mellitus with other circulatory complication, without long-term current use of insulin (H)  -     PAF COMPLETED  -     OPHTHALMOLOGY ADULT REFERRAL  -     order for DME; Equipment being ordered: diabetic shoes  Patient has preulcerative callus    Benign essential hypertension  -     doxazosin (CARDURA) 4 MG tablet; Take 1 tablet (4 mg) by mouth 2 times daily    Microalbuminuria    Hyperlipidemia LDL goal <70  -     Lipid panel reflex to direct LDL Fasting; Future    S/P CABG (coronary artery bypass graft)    Tendonitis  -     ERI PT, HAND, AND CHIROPRACTIC REFERRAL; Future    Screening PSA (prostate specific antigen)  -     Prostate spec antigen screen; Future    Special screening for malignant neoplasms, colon  -     Fecal colorectal cancer screen (FIT); Future         Patient has pre-ulcerative callus on the left foot and left hand he seems to have tendinitis from carrying some heavy grocery " bags  We will arrange physical therapy  His blood pressure is good but not ideal and dose of doxazosin will be increased to 4 mg twice daily  Also I think he should wear diabetic shoes with orthotics and that will be ordered  He will continue all other blood pressure medication also including losartan, carvedilol and Inspra  His coronary and vascular disease is remaining stable with residual aphasia  Flu shot will be unchanged      Return in about 6 months (around 3/13/2020) for flu shot 2 weeks schedule 3rd october AND LABS.    Hai Mckee MD  [facility]

## 2019-09-13 NOTE — PROGRESS NOTES
Hand Therapy Initial Evaluation  Current Date:  9/13/2019    Diagnosis: L Hand Pain  DOI: MD salomon 9/13/2019   Post:  1 month since onset of symptoms    Precautions: Stroke 10 years ago affecting right    Subjective:  Mati Pulliam is a 68 year old male.    Patient reports symptoms of the left dorsal hand which occurred due to unknown. Patient reports pain in dorsal hand (LF and RF MCPJ area) that started without aggravating event. Most pain with mousing, gripping and while sleeping. Since onset symptoms are Gradually getting worse.  Special tests:  none.  Previous treatment: topical cream.    General health as reported by patient is good.  Pertinent medical history includes:Stroke  Medical allergies:none.  Surgical history: none.  Medication history: None listed, see EMR.    Current occupation is Patient has real estate license, works intermittently  Job Tasks: Computer Work  Hand dominance: right, but patient had stroke that affected right side so he has been using left more  Other: patient lives with son and twin grandchildren (9 years), has 3 other grandchildren (2-6) enjoys mechanics and playing with legos but hard to do with hands at this time    Upper Extremity Functional Index Score:  SCORE:   Column Totals: /80: 28   (A lower score indicates greater disability.)    Objective:    Pain Level (Scale 0-10):   9/13/2019   At Rest 0/10   With Use 6-7/10     Pain Description:  Date 9/13/2019   Location Left dorsal hand - mostly over RF and LF dorsal MCPJ   Pain Quality Aching   Frequency intermittent    Pain is worst daytime   Exacerbated by Holding phone, mousing, gripping/lifting and sleeping position   Relieved by rest   Progression worsening     ROM  Pain Report:  + mild    ++ moderate    +++ severe   Wrist 9/13/2019 9/13/2019   AROM (PROM) Right* Left   Extension 50 70   Flexion 71 70+   RD 20 20   UD 30 35+ dorsal wrist   Supination 80 85   Pronation 85 85   * History of stroke affecting right  side    ROM  Hand 9/13/2019 9/13/2019   AROM(PROM) Right* Left   Index MP WNL /90   PIP / /100   DIP / /60   RODRIGUEZ     Long MP WNL /95   PIP / /97   DIP / /74   RODRIGUEZ     Ring MP WNL /80   PIP / +/100   DIP / /75   RODRIGUEZ     Small MP WNL /75   PIP / /87   DIP / /55   RODRIGUEZ     * History of stroke affecting right side    Strength   (Measured in pounds)  Pain Report:  + mild    ++ moderate    +++ severe    9/13/2019 9/13/2019   Trials Right* Left   1  2  3 34 30+   Average 34 30     Lat Pinch 9/13/2019 9/13/2019   Trials Right* Left   1  2  3 12 13-   Average 12 13     3 Pt Pinch 9/13/2019 9/13/2019   Trials Right* Left   1  2  3 13 11-   Average 13 11   * History of stroke affecting right side    Edema: None at time of evaluation    Scar/Wound:  N/A    Sensation:WNL throughout all nerve distributions; per patient report    Palpation  Date 9/13/19   Side Left   Dorsal wrist/EDC +   Dorsal LF MCP -   Dorsal RF MCP -             Resisted Testing  Date 9/13/19   Side Left   IF extension +   LF extension +   RF extension +   SF extension -   Wrist extension -     Assessment:  Patient presents with symptoms consistent with diagnosis as listed above with conservative intervention. Per therapist evaluation, patient's symptoms are consistent with extensor (EDC) tendonitis likely due to overuse. Cannot rule out joint involvement.    Patient's limitations or Problem List includes:  Pain of the left dorsal hand (primarily to RF and LF) which interferes with the patient's ability to perform Recreational Activities, Household Chores and computer use as compared to previous level of function.    Rehab Potential:  Excellent - Return to full activity, no limitations    Patient will benefit from skilled Occupational Therapy to increase flexibility,  strength and pinch strength and decrease pain to return to previous activity level and resume normal daily tasks and to reach their rehab potential.    Barriers to Learning:  No  barrier    Communication Issues:  Patient appears to be able to clearly communicate and understand verbal and written communication and follow directions correctly.    Chart Review: Chart Review and Detailed history review with patient    Identified Performance Deficits: care of others, home establishment and management, leisure activities and computer/phone use.    Assessment of Occupational Performance:  3-5 Performance Deficits    Clinical Decision Making (Complexity): Low complexity    Treatment Explanation:  The following has been discussed with the patient:  RX ordered/plan of care  Anticipated outcomes  Possible risks and side effects    Plan:  Frequency:  1 X week, once daily  Duration:  for 8 weeks    Treatment Plan:  Modalities:  US and Laser Light  Therapeutic Exercise:  AROM, AAROM, PROM, Tendon Gliding, Isotonics, Isometrics and Stabilization  Neuromuscular re-education:  Kinesiotaping  Manual Techniques:  Myofascial release and Manual edema mobilization  Orthotic Fabrication:  Forearm based orthosis  Self Care:  Self Care Tasks    Home Program:  Avoid activities that aggravate pain in the hand  ADRIAN MP flexion blocking splint for night and day per symptoms  Heat/Ice PRN  Extensor tendon gliding with marker  Tendon gliding fist series  AROM wrist flexion/extension    Next visit:   Check splint  Laser  MFR to dorsal hand/wrist and FA  AROM  K-tape trial to EDC?  Future visits: strengthening    Discharge Plan:  Achieve all LTG.  Independent in home treatment program.  Reach maximal therapeutic benefit.    Please see daily flow sheet for treatment and 1:1 time provided today.

## 2019-09-17 NOTE — PROGRESS NOTES
SOAP Note Objective Information for 9/18/2019:    Pain Level (Scale 0-10):   9/13/2019 9/18/19   At Rest 0/10 0/10   With Use 6-7/10 6-7/10     Pain Description:  Date 9/13/2019 9/18/19   Location Left dorsal hand - mostly over RF and LF dorsal MCPJ L dorsal hand - RF and LF dorsal MCPJ  L dorsal webspace   Pain Quality Aching aching   Frequency intermittent  intermittent   Pain is worst daytime daytime   Exacerbated by Holding phone, mousing, gripping/lifting and sleeping position Holding phone, handling dishes   Relieved by rest Rest, possibly splint   Progression worsening No change     Home Program:  Avoid activities that aggravate pain in the hand  ADRIAN MP flexion blocking splint for night and day per symptoms  Heat/Ice PRN  Extensor tendon gliding with marker  Tendon gliding fist series  AROM wrist flexion/extension  Adductor release with clip  Recommend pop socket for phone to take tension off hand  K-tape trial to EDC - strip over dorsal hand/distal FA with strips covering IF, LF and RF dorsal P1 to PIP    Next visit:   Check response to K-tape  Laser  MFR to dorsal hand/wrist and FA  AROM    Future visits: strengthening    Please see daily flow sheet for treatment and 1:1 time provided today.

## 2019-09-18 ENCOUNTER — THERAPY VISIT (OUTPATIENT)
Dept: OCCUPATIONAL THERAPY | Facility: CLINIC | Age: 69
End: 2019-09-18
Payer: COMMERCIAL

## 2019-09-18 DIAGNOSIS — M79.642 PAIN OF LEFT HAND: ICD-10-CM

## 2019-09-18 DIAGNOSIS — M77.9 TENDONITIS: ICD-10-CM

## 2019-09-18 PROCEDURE — 97110 THERAPEUTIC EXERCISES: CPT | Mod: GO | Performed by: OCCUPATIONAL THERAPIST

## 2019-09-18 PROCEDURE — 97140 MANUAL THERAPY 1/> REGIONS: CPT | Mod: GO | Performed by: OCCUPATIONAL THERAPIST

## 2019-09-19 ENCOUNTER — TELEPHONE (OUTPATIENT)
Dept: FAMILY MEDICINE | Facility: CLINIC | Age: 69
End: 2019-09-19

## 2019-09-19 NOTE — TELEPHONE ENCOUNTER
Prior Authorization Retail Medication Request    Medication/Dose: Doxazosin Mesylate 4 mg  ICD code (if different than what is on RX):    Previously Tried and Failed:  Avalide, Clonidine patches, Hyzaar  Rationale:  Patient with moderately controlled hypertension needing more medication to be within the accepted range    Insurance Name:  [employer]  Insurance ID:  [id number]      Pharmacy Information (if different than what is on RX)  Name:  [id number]  Phone:  [phone]    CoverMyMeds key: [id number]

## 2019-09-23 NOTE — TELEPHONE ENCOUNTER
Central Prior Authorization Team  Phone: [phone]    PA Initiation    Medication: Doxazosin Mesylate 4 mg  Insurance Company: [employer] - Phone [phone] Fax [phone]  Pharmacy Filling the Rx: [id number] - [place] - [address]  Filling Pharmacy Phone: [phone]  Filling Pharmacy Fax:    Start Date: 9/23/2019

## 2019-09-23 NOTE — TELEPHONE ENCOUNTER
Prior Authorization Approval    Authorization Effective Date: 8/24/2019  Authorization Expiration Date: 9/22/2022  Medication: Doxazosin Mesylate 4 mg- APPROVED   Approved Dose/Quantity:   Reference #:     Insurance Company: [employer] - Phone [phone] Fax [phone]  Expected CoPay:       CoPay Card Available:      Foundation Assistance Needed:    Which Pharmacy is filling the prescription (Not needed for infusion/clinic administered): [id number] - [place], [place] - [address] [address]  Pharmacy Notified: Yes  Patient Notified: Comment:  **Instructed pharmacy to notify patient when script is ready to /ship.**

## 2019-09-24 NOTE — PROGRESS NOTES
SOAP Note Objective Information for 9/25/2019:    Pain Level (Scale 0-10):   9/13/2019 9/18/19 9/25/19   At Rest 0/10 0/10 0/10   With Use 6-7/10 6-7/10 5/10     Pain Description:  Date 9/13/2019 9/18/19   Location Left dorsal hand - mostly over RF and LF dorsal MCPJ L dorsal hand - RF and LF dorsal MCPJ  L dorsal webspace   Pain Quality Aching aching   Frequency intermittent  intermittent   Pain is worst daytime daytime   Exacerbated by Holding phone, mousing, gripping/lifting and sleeping position Holding phone, handling dishes   Relieved by rest Rest, possibly splint   Progression worsening No change     Home Program:  Avoid activities that aggravate pain in the hand  ADRIAN MP flexion blocking splint for night and day per symptoms  Heat/Ice PRN  Extensor tendon gliding with marker  Tendon gliding fist series  AROM wrist flexion/extension  Adductor release with clip  Recommend pop socket for phone to take tension off hand with phone use  K-tape trial to EDC - strip over dorsal hand/distal FA with strips covering IF, LF and RF dorsal P1 to PIP, added one strip over dorsal thumb into extensors  MFR to FA extensors with ball  FA extensor stretch    Next visit:   Laser  MFR to dorsal hand/wrist and FA  AROM  K-tape  Future visits: strengthening    Please see daily flow sheet for treatment and 1:1 time provided today.

## 2019-09-25 ENCOUNTER — THERAPY VISIT (OUTPATIENT)
Dept: OCCUPATIONAL THERAPY | Facility: CLINIC | Age: 69
End: 2019-09-25
Payer: COMMERCIAL

## 2019-09-25 DIAGNOSIS — M79.642 PAIN OF LEFT HAND: ICD-10-CM

## 2019-09-25 DIAGNOSIS — M77.9 TENDONITIS: ICD-10-CM

## 2019-09-25 PROCEDURE — 97110 THERAPEUTIC EXERCISES: CPT | Mod: GO | Performed by: OCCUPATIONAL THERAPIST

## 2019-09-25 PROCEDURE — 97140 MANUAL THERAPY 1/> REGIONS: CPT | Mod: GO | Performed by: OCCUPATIONAL THERAPIST

## 2019-10-01 NOTE — PROGRESS NOTES
SOAP Note Objective Information for 10/2/2019:    Pain Level (Scale 0-10):   9/13/2019 9/18/19 9/25/19 10/2/19   At Rest 0/10 0/10 0/10 0/10   With Use 6-7/10 6-7/10 5/10 4/10     Pain Description:  Date 9/13/2019 9/18/19   Location Left dorsal hand - mostly over RF and LF dorsal MCPJ L dorsal hand - RF and LF dorsal MCPJ  L dorsal webspace   Pain Quality Aching aching   Frequency intermittent  intermittent   Pain is worst daytime daytime   Exacerbated by Holding phone, mousing, gripping/lifting and sleeping position Holding phone, handling dishes   Relieved by rest Rest, possibly splint   Progression worsening No change     Home Program:  Avoid activities that aggravate pain in the hand  ADRIAN MP flexion blocking splint for night and day per symptoms  Heat/Ice PRN  Extensor tendon gliding with marker  Tendon gliding fist series  AROM wrist flexion/extension  Adductor release with clip  Recommend pop socket for phone to take tension off hand with phone use  K-tape trial to EDC - strip over dorsal hand/distal FA with strips covering IF, LF and RF dorsal P1 to PIP, added one strip over dorsal thumb into extensors  MFR to FA extensors with ball  FA extensor stretch  L HBTSS - night and per symptoms    Next visit:   Check response to HBTSS  Laser  MFR to dorsal hand/wrist and FA  AROM  K-tape  Future visits: strengthening    Please see daily flow sheet for treatment and 1:1 time provided today.

## 2019-10-02 ENCOUNTER — THERAPY VISIT (OUTPATIENT)
Dept: OCCUPATIONAL THERAPY | Facility: CLINIC | Age: 69
End: 2019-10-02
Payer: COMMERCIAL

## 2019-10-02 DIAGNOSIS — M79.642 PAIN OF LEFT HAND: ICD-10-CM

## 2019-10-02 DIAGNOSIS — M77.9 TENDONITIS: ICD-10-CM

## 2019-10-02 PROCEDURE — 97763 ORTHC/PROSTC MGMT SBSQ ENC: CPT | Mod: GO | Performed by: OCCUPATIONAL THERAPIST

## 2019-10-02 PROCEDURE — 97140 MANUAL THERAPY 1/> REGIONS: CPT | Mod: GO | Performed by: OCCUPATIONAL THERAPIST

## 2019-10-03 DIAGNOSIS — Z12.5 SCREENING PSA (PROSTATE SPECIFIC ANTIGEN): ICD-10-CM

## 2019-10-03 DIAGNOSIS — E78.5 HYPERLIPIDEMIA LDL GOAL <70: ICD-10-CM

## 2019-10-03 LAB
CHOLEST SERPL-MCNC: 96 MG/DL
HDLC SERPL-MCNC: 38 MG/DL
LDLC SERPL CALC-MCNC: 37 MG/DL
NONHDLC SERPL-MCNC: 58 MG/DL
PSA SERPL-ACNC: 2.74 UG/L (ref 0–4)
TRIGL SERPL-MCNC: 105 MG/DL

## 2019-10-03 PROCEDURE — 80061 LIPID PANEL: CPT | Performed by: INTERNAL MEDICINE

## 2019-10-03 PROCEDURE — 36415 COLL VENOUS BLD VENIPUNCTURE: CPT | Performed by: INTERNAL MEDICINE

## 2019-10-03 PROCEDURE — G0103 PSA SCREENING: HCPCS | Performed by: INTERNAL MEDICINE

## 2019-10-09 NOTE — PROGRESS NOTES
SOAP Note Objective Information for 10/11/2019:    Pain Level (Scale 0-10):   9/13/2019 9/18/19 9/25/19 10/2/19 10/11/19   At Rest 0/10 0/10 0/10 0/10 0/10   With Use 6-7/10 6-7/10 5/10 4/10 3-4/10     Pain Description:  Date 9/13/2019 9/18/19   Location Left dorsal hand - mostly over RF and LF dorsal MCPJ L dorsal hand - RF and LF dorsal MCPJ  L dorsal webspace   Pain Quality Aching aching   Frequency intermittent  intermittent   Pain is worst daytime daytime   Exacerbated by Holding phone, mousing, gripping/lifting and sleeping position Holding phone, handling dishes   Relieved by rest Rest, possibly splint   Progression worsening No change     Home Program:  Avoid activities that aggravate pain in the hand  ADRIAN MP flexion blocking splint for night and day per symptoms  Heat/Ice PRN  Extensor tendon gliding with marker  Tendon gliding fist series  AROM wrist flexion/extension  Adductor release with clip  Recommend pop socket for phone to take tension off hand with phone use  K-tape trial to EDC - strip over dorsal hand/distal FA with strips covering IF, LF and RF dorsal P1 to PIP, added one strip over dorsal thumb into extensors  MFR to FA extensors with ball  FA extensor stretch  L HBTSS - night and per symptoms    Next visit:   PN due  Laser  MFR to dorsal hand/wrist and FA  AROM  K-tape  Future visits: strengthening    Please see daily flow sheet for treatment and 1:1 time provided today.

## 2019-10-11 ENCOUNTER — THERAPY VISIT (OUTPATIENT)
Dept: OCCUPATIONAL THERAPY | Facility: CLINIC | Age: 69
End: 2019-10-11
Payer: COMMERCIAL

## 2019-10-11 DIAGNOSIS — M77.9 TENDONITIS: ICD-10-CM

## 2019-10-11 DIAGNOSIS — M79.642 PAIN OF LEFT HAND: ICD-10-CM

## 2019-10-11 PROCEDURE — 97140 MANUAL THERAPY 1/> REGIONS: CPT | Mod: GO | Performed by: OCCUPATIONAL THERAPIST

## 2019-10-11 PROCEDURE — 97110 THERAPEUTIC EXERCISES: CPT | Mod: GO | Performed by: OCCUPATIONAL THERAPIST

## 2019-10-22 NOTE — PROGRESS NOTES
Hand Therapy Progress Note  Reporting Period:  9/13/2019 through 10/25/2019    Diagnosis: L Hand Pain  DOI: MD salomon 9/13/2019   Post:  1 month since onset of symptoms    Precautions: Stroke 10 years ago affecting right    Initial History:  Patient reports symptoms of the left dorsal hand which occurred due to unknown. Patient reports pain in dorsal hand (LF and RF MCPJ area) that started without aggravating event. Most pain with mousing, gripping and while sleeping. Since onset symptoms are Gradually getting worse.  Special tests:  none.  Previous treatment: topical cream.    General health as reported by patient is good.  Pertinent medical history includes:Stroke  Medical allergies:none.  Surgical history: none.  Medication history: None listed, see EMR.    Current occupation is Patient has real estate license, works intermittently  Job Tasks: Computer Work  Hand dominance: right, but patient had stroke that affected right side so he has been using left more  Other: patient lives with son and twin grandchildren (9 years), has 3 other grandchildren (2-6) enjoys mechanics and playing with legos but hard to do with hands at this time    Upper Extremity Functional Index Score:  SCORE:   Column Totals: /80: 49   (A lower score indicates greater disability.)    S:  Subjective changes as noted by patient: Its doing better! I've noticed some pain here (LEP) this week.  Functional changes noted by patient: Improvement in Self Care Tasks (dressing, eating, bathing, hygiene/toileting)  Response to previous treatment: good  Patient has noted adverse reaction to: None    Objective:    Pain Level (Scale 0-10):   9/13/2019 9/18/19 9/25/19 10/2/19 10/11/19 10/25/19   At Rest 0/10 0/10 0/10 0/10 0/10 0/10   With Use 6-7/10 6-7/10 5/10 4/10 3-4/10 2/10     Pain Description:  Date 9/13/2019 9/18/19 10/25/19   Location Left dorsal hand - mostly over RF and LF dorsal MCPJ L dorsal hand - RF and LF dorsal MCPJ  L dorsal webspace L  dorsal hand (RF and LF MCP, dorsal webspace, lateral elbow   Pain Quality Aching aching sore   Frequency intermittent  intermittent intermittent   Pain is worst daytime daytime    Exacerbated by Holding phone, mousing, gripping/lifting and sleeping position Holding phone, handling dishes Holding phone, handling dishes   Relieved by rest Rest, possibly splint Rest, splint, exercises   Progression worsening No change improving     ROM  Pain Report:  + mild    ++ moderate    +++ severe   Wrist 9/13/2019 9/13/2019   AROM (PROM) Right* Left   Extension 50 70   Flexion 71 70+   RD 20 20   UD 30 35+ dorsal wrist   Supination 80 85   Pronation 85 85   * History of stroke affecting right side    ROM  Hand 9/13/2019 9/13/2019   AROM(PROM) Right* Left   Index MP WNL /90   PIP / /100   DIP / /60   RODRIGUEZ     Long MP WNL /95   PIP / /97   DIP / /74   RODRIGUEZ     Ring MP WNL /80   PIP / +/100   DIP / /75   RODRIGUEZ     Small MP WNL /75   PIP / /87   DIP / /55   RODRIGUEZ     * History of stroke affecting right side    Strength   (Measured in pounds)  Pain Report:  + mild    ++ moderate    +++ severe    9/13/2019 9/13/2019 10/25/19   Trials Right* Left Left   1  2  3 34 30+ 42-   Average 34 30 42     Lat Pinch 9/13/2019 9/13/2019   Trials Right* Left   1  2  3 12 13-   Average 12 13     3 Pt Pinch 9/13/2019 9/13/2019   Trials Right* Left   1  2  3 13 11-   Average 13 11   * History of stroke affecting right side    Edema: None at time of evaluation    Scar/Wound:  N/A    Sensation:WNL throughout all nerve distributions; per patient report    Palpation  Date 9/13/19 10/25/19   Side Left Left   Dorsal wrist/EDC + -   Dorsal LF MCP - -   Dorsal RF MCP - -   LEP  +          Resisted Testing  Date 9/13/19 10/25/19   Side Left Left   IF extension + +   LF extension + +   RF extension + +   SF extension - -   Wrist extension - -     Assessment:  Response to therapy has been improvement to:  Strength:    Pain:  frequency is less, intensity of pain  is decreased, duration of pain is decreased and less tender over affected area. Patient does report a new pain near LEP, was instructed in self stretching and massage to area.  Overall Assessment:  Patient is progressing well and is ready to decrease frequency of treatment in the clinic.  STG/LTG:  See goal sheet for details and updates of remaining functional limitations.     Plan:  I have re-evaluated this patient and find that the nature, scope, duration and intensity of the therapy is appropriate for the medical condition of the patient.  Frequency:  1 X every other week, once daily  Duration:  for 4 additional weeks (total 8 per POC)    Home Program:  Avoid activities that aggravate pain in the hand  ADRIAN MP flexion blocking splint for night and day per symptoms  Heat/Ice PRN  Extensor tendon gliding with marker  Tendon gliding fist series  AROM wrist flexion/extension  Adductor release with clip  Recommend pop socket for phone to take tension off hand with phone use  K-tape trial to EDC - strip over dorsal hand/distal FA with strips covering IF, LF and RF dorsal P1 to PIP, added one strip over dorsal thumb into extensors  MFR to FA extensors with ball  FA extensor stretch  L HBTSS - night and per symptoms    Next visit:   Check on LEP pain  Laser  MFR to dorsal hand/wrist and FA  AROM  K-tape  Future visits: strengthening (gentle ?)    Please see daily flow sheet for treatment and 1:1 time provided today.

## 2019-10-25 ENCOUNTER — THERAPY VISIT (OUTPATIENT)
Dept: OCCUPATIONAL THERAPY | Facility: CLINIC | Age: 69
End: 2019-10-25
Payer: COMMERCIAL

## 2019-10-25 DIAGNOSIS — M77.9 TENDONITIS: ICD-10-CM

## 2019-10-25 DIAGNOSIS — M79.642 PAIN OF LEFT HAND: ICD-10-CM

## 2019-10-25 PROCEDURE — 97140 MANUAL THERAPY 1/> REGIONS: CPT | Mod: GO | Performed by: OCCUPATIONAL THERAPIST

## 2019-10-25 PROCEDURE — 97110 THERAPEUTIC EXERCISES: CPT | Mod: GO | Performed by: OCCUPATIONAL THERAPIST

## 2019-10-29 ENCOUNTER — TRANSFERRED RECORDS (OUTPATIENT)
Dept: HEALTH INFORMATION MANAGEMENT | Facility: CLINIC | Age: 69
End: 2019-10-29

## 2019-10-30 ENCOUNTER — ALLIED HEALTH/NURSE VISIT (OUTPATIENT)
Dept: NURSING | Facility: CLINIC | Age: 69
End: 2019-10-30
Payer: COMMERCIAL

## 2019-10-30 DIAGNOSIS — Z23 NEED FOR PROPHYLACTIC VACCINATION AND INOCULATION AGAINST INFLUENZA: Primary | ICD-10-CM

## 2019-10-30 PROCEDURE — 90662 IIV NO PRSV INCREASED AG IM: CPT

## 2019-10-30 PROCEDURE — G0008 ADMIN INFLUENZA VIRUS VAC: HCPCS

## 2019-10-30 PROCEDURE — 99207 ZZC NO CHARGE NURSE ONLY: CPT

## 2019-11-01 LAB — RETINOPATHY: NORMAL

## 2019-11-03 ENCOUNTER — HEALTH MAINTENANCE LETTER (OUTPATIENT)
Age: 69
End: 2019-11-03

## 2019-11-04 DIAGNOSIS — Z12.11 SPECIAL SCREENING FOR MALIGNANT NEOPLASMS, COLON: ICD-10-CM

## 2019-11-04 PROCEDURE — 82274 ASSAY TEST FOR BLOOD FECAL: CPT | Performed by: INTERNAL MEDICINE

## 2019-11-07 LAB — HEMOCCULT STL QL IA: NEGATIVE

## 2019-11-08 NOTE — PROGRESS NOTES
SOAP Note Objective Information for 11/12/2019:    Pain Level (Scale 0-10):   9/13/2019 9/18/19 9/25/19 10/11/19 10/25/19 11/12/19   At Rest 0/10 0/10 0/10 0/10 0/10 0/10   With Use 6-7/10 6-7/10 5/10 3-4/10 2/10 0-3/10     Pain Description:  Date 9/13/2019 9/18/19 10/25/19   Location Left dorsal hand - mostly over RF and LF dorsal MCPJ L dorsal hand - RF and LF dorsal MCPJ  L dorsal webspace L dorsal hand (RF and LF MCP, dorsal webspace, lateral elbow   Pain Quality Aching aching sore   Frequency intermittent  intermittent intermittent   Pain is worst daytime daytime    Exacerbated by Holding phone, mousing, gripping/lifting and sleeping position Holding phone, handling dishes Holding phone, handling dishes   Relieved by rest Rest, possibly splint Rest, splint, exercises   Progression worsening No change improving     Home Program:  Avoid activities that aggravate pain in the hand  ADRIAN MP flexion blocking splint for night and day per symptoms  Heat/Ice PRN  Extensor tendon gliding with marker  Tendon gliding fist series  AROM wrist flexion/extension  Adductor release with clip  Recommend pop socket for phone to take tension off hand with phone use  K-tape trial to EDC - strip over dorsal hand/distal FA with strips covering IF, LF and RF dorsal P1 to PIP, added one strip over dorsal thumb into extensors  MFR to FA extensors with ball  FA extensor stretch  L HBTSS - night and per symptoms  1st DI strengthening    Next visit:   PN due - discharge to HEP vs add more visits if still aggravated  Laser - dorsal hand/adductor, LEP/extensors  MFR to dorsal hand/wrist and FA  AROM  Review HEP    Please see daily flow sheet for treatment and 1:1 time provided today.

## 2019-11-12 ENCOUNTER — THERAPY VISIT (OUTPATIENT)
Dept: OCCUPATIONAL THERAPY | Facility: CLINIC | Age: 69
End: 2019-11-12
Payer: COMMERCIAL

## 2019-11-12 DIAGNOSIS — M77.9 TENDONITIS: ICD-10-CM

## 2019-11-12 DIAGNOSIS — M79.642 PAIN OF LEFT HAND: ICD-10-CM

## 2019-11-12 PROCEDURE — 97110 THERAPEUTIC EXERCISES: CPT | Mod: GO | Performed by: OCCUPATIONAL THERAPIST

## 2019-11-12 PROCEDURE — 97140 MANUAL THERAPY 1/> REGIONS: CPT | Mod: GO | Performed by: OCCUPATIONAL THERAPIST

## 2019-11-21 LAB — HBA1C MFR BLD: 6.2 % (ref 0–5.6)

## 2019-12-03 ENCOUNTER — DOCUMENTATION ONLY (OUTPATIENT)
Dept: ENDOCRINOLOGY | Facility: CLINIC | Age: 69
End: 2019-12-03

## 2019-12-03 NOTE — PROGRESS NOTES
GRADE study visit     Patient is here for 57 month GRADE study visit. Patient is doing well and continues on Metformin 1000 mg bid in addition to randomized treatment of glimepiride.  He is currently taking 0.5 mg daily.  He has had no hypoglycemia and almost all readings are in range.  BP has been better on less medication.  Continues taking care of his young grandchildren.  Last a1c was 6.5%.        Lab results:   A1c: 6.2%     Plan:   1. Diabetes- A1c at target. No hypoglycemia. No changes. Advised to remain on Amaryl 0.5 mg daily. Agree he should skip it if he will be physically active.   2. Follow up in 3 months, sooner with concerns.      Lulu Cortés PA-C, MPAS   [facility]  Department of Medicine  Division of Endocrinology and Diabetes

## 2019-12-03 NOTE — PROGRESS NOTES
Hand Therapy Progress Note  Reporting Period: 10/25/2019 through 12/6/2019    Diagnosis: L Hand Pain  DOI: MD salomon 9/13/2019   Post:  3 months since onset of symptoms    Precautions: Stroke 10 years ago affecting right    Initial History:  Patient reports symptoms of the left dorsal hand which occurred due to unknown. Patient reports pain in dorsal hand (LF and RF MCPJ area) that started without aggravating event. Most pain with mousing, gripping and while sleeping. Since onset symptoms are Gradually getting worse.  Special tests:  none.  Previous treatment: topical cream.    General health as reported by patient is good.  Pertinent medical history includes:Stroke  Medical allergies:none.  Surgical history: none.  Medication history: None listed, see EMR.    Current occupation is Patient has real estate license, works intermittently  Job Tasks: Computer Work  Hand dominance: right, but patient had stroke that affected right side so he has been using left more  Other: patient lives with son and twin grandchildren (9 years), has 3 other grandchildren (2-6) enjoys mechanics and playing with legos but hard to do with hands at this time    Upper Extremity Functional Index Score:  SCORE:   Column Totals: /80: 60   (A lower score indicates greater disability.)    Subjective:  Subjective changes as noted by patient: the hand is feeling better, but sometimes I feel pain here (thumb IPJ) when I'm going to pinch/ something.    Functional changes noted by patient: Improvement in Self Care Tasks (dressing, eating, bathing, hygiene/toileting)  Response to previous treatment: good  Patient has noted adverse reaction to: None    Objective:    Pain Level (Scale 0-10):   9/13/2019 9/25/19 10/2/19 10/25/19 12/6/19   At Rest 0/10 0/10 0/10 0/10 0/10   With Use 6-7/10 5/10 4/10 2/10 2/10     Pain Description:  Date 9/13/2019 10/25/19 12/6/19   Location Left dorsal hand - mostly over RF and LF dorsal MCPJ L dorsal hand (RF and LF  MCP, dorsal webspace, lateral elbow L dorsal hand (RF and LF MCP, dorsal webspace, lateral elbow, thumb IPJ   Pain Quality Aching sore Sore in back of hand, can be sharp through thumb IPJ   Frequency intermittent  intermittent intermittent   Pain is worst daytime     Exacerbated by Holding phone, mousing, gripping/lifting and sleeping position Holding phone, handling dishes Holding phone, gripping/pinching   Relieved by rest Rest, splint, exercises Rest, splint, exercises   Progression worsening improving Improving in dorsal hand, worse lately in thumb     ROM  Pain Report:  + mild    ++ moderate    +++ severe   Wrist 9/13/2019 9/13/2019 12/6/19   AROM (PROM) Right* Left Left   Extension 50 70 80   Flexion 71 70+ 75+ dorsal hand   RD 20 20 20   UD 30 35+ dorsal wrist 35 + dorsal wrist   Supination 80 85 WNL   Pronation 85 85 WNL   * History of stroke affecting right side    ROM  Hand 9/13/2019 9/13/2019   AROM(PROM) Right* Left   Index MP WNL /90   PIP / /100   DIP / /60   RODRIGUEZ     Long MP WNL /95   PIP / /97   DIP / /74   RODRIGUEZ     Ring MP WNL /80   PIP / +/100   DIP / /75   RODRIGUEZ     Small MP WNL /75   PIP / /87   DIP / /55   RODRIGUEZ     * History of stroke affecting right side    Strength   (Measured in pounds)  Pain Report:  + mild    ++ moderate    +++ severe    9/13/2019 9/13/2019 10/25/19 12/6/19   Trials Right* Left Left Left   1  2  3 34 30+ 42- 43- hand, but sore in dorsal forearm   Average 34 30 42 43     Lat Pinch 9/13/2019 9/13/2019 12/6/19   Trials Right* Left Left   1  2  3 12 13- 17-   Average 12 13 17     3 Pt Pinch 9/13/2019 9/13/2019 12/6/19   Trials Right* Left Left   1  2  3 13 11- 13- hand, but sore in dorsal forearm   Average 13 11 13   * History of stroke affecting right side    Edema: None at time of evaluation    Scar/Wound:  N/A    Sensation:WNL throughout all nerve distributions; per patient report    Palpation  Date 9/13/19 10/25/19 12/6/19   Side Left Left Left   Dorsal wrist/EDC + - -    Dorsal LF MCP - - -   Dorsal RF MCP - - -   LEP  + +           Resisted Testing  Date 9/13/19 10/25/19 12/6/19   Side Left Left Left   IF extension + + +   LF extension + + +   RF extension + + -   SF extension - - -   Wrist extension - - + dorsal elbow     Assessment:  Response to therapy has been improvement to:  ROM of Wrist:  Ext  and Flex  Strength:   and pinch  Pain:  frequency is less, intensity of pain is decreased, duration of pain is decreased and less tender over affected area  Overall Assessment:  Patient is progressing well and is ready to decrease frequency of treatment in the clinic.  STG/LTG:  See goal sheet for details and updates of remaining functional limitations.     Plan:  I have re-evaluated this patient and find that the nature, scope, duration and intensity of the therapy is appropriate for the medical condition of the patient.  Frequency:  1 X every other week, once daily  Duration:  for 8 additional weeks (total 12 per updated POC)     Home Program:  Avoid activities that aggravate pain in the hand  ADRIAN MP flexion blocking splint for night and day per symptoms  Heat/Ice PRN  Extensor tendon gliding with marker  Tendon gliding fist series  AROM wrist flexion/extension  Adductor release with clip  Recommend pop socket for phone to take tension off hand with phone use  K-tape trial to EDC - strip over dorsal hand/distal FA with strips covering IF, LF and RF dorsal P1 to PIP, added one strip over dorsal thumb into extensors  MFR to FA extensors with ball  FA extensor stretch  L HBTSS - night and per symptoms  1st DI strengthening  Thumb based IPJ extension orthosis per comfort with use    Next visit:   Laser - dorsal hand/adductor, LEP/extensors  Check response to thumb extension splint  MFR to dorsal hand/wrist and FA  AROM  Review HEP    Please see daily flow sheet for treatment and 1:1 time provided today.

## 2019-12-06 ENCOUNTER — THERAPY VISIT (OUTPATIENT)
Dept: OCCUPATIONAL THERAPY | Facility: CLINIC | Age: 69
End: 2019-12-06
Payer: COMMERCIAL

## 2019-12-06 DIAGNOSIS — M77.9 TENDONITIS: ICD-10-CM

## 2019-12-06 DIAGNOSIS — M79.642 PAIN OF LEFT HAND: ICD-10-CM

## 2019-12-06 PROCEDURE — 97140 MANUAL THERAPY 1/> REGIONS: CPT | Mod: GO | Performed by: OCCUPATIONAL THERAPIST

## 2019-12-06 PROCEDURE — 97110 THERAPEUTIC EXERCISES: CPT | Mod: GO | Performed by: OCCUPATIONAL THERAPIST

## 2020-01-07 PROBLEM — M77.9 TENDONITIS: Status: RESOLVED | Noted: 2019-09-13 | Resolved: 2020-01-07

## 2020-01-07 PROBLEM — M79.642 PAIN OF LEFT HAND: Status: RESOLVED | Noted: 2019-09-13 | Resolved: 2020-01-07

## 2020-02-10 ENCOUNTER — HEALTH MAINTENANCE LETTER (OUTPATIENT)
Age: 70
End: 2020-02-10

## 2020-02-24 DIAGNOSIS — E78.5 HYPERLIPIDEMIA LDL GOAL <70: ICD-10-CM

## 2020-02-24 RX ORDER — ROSUVASTATIN CALCIUM 20 MG/1
TABLET, COATED ORAL
Qty: 90 TABLET | Refills: 2 | Status: SHIPPED | OUTPATIENT
Start: 2020-02-24 | End: 2020-08-07

## 2020-02-24 NOTE — TELEPHONE ENCOUNTER
"Requested Prescriptions   Pending Prescriptions Disp Refills     rosuvastatin (CRESTOR) 20 MG tablet [Pharmacy Med Name: Rosuvastatin Calcium Oral Tablet 20 MG] 90 tablet 1     Sig: TAKE ONE TABLET BY MOUTH ONE TIME DAILY   Last Written Prescription Date:  5/24/2019  Last Fill Quantity: 90,  # refills: 2   Last office visit: 9/13/2019 with prescribing provider:  Rylee   Future Office Visit:   Next 5 appointments (look out 90 days)    Mar 13, 2020 11:30 AM CDT  Office Visit with Hai Mckee MD  [facility] [address] [facility] [address]  [phone]           Statins Protocol Passed - 2/24/2020  3:57 PM        Passed - LDL on file in past 12 months     Recent Labs   Lab Test 10/03/19  0836   LDL 37             Passed - No abnormal creatine kinase in past 12 months     No lab results found.             Passed - Recent (12 mo) or future (30 days) visit within the authorizing provider's specialty     Patient has had an office visit with the authorizing provider or a provider within the authorizing providers department within the previous 12 mos or has a future within next 30 days. See \"Patient Info\" tab in inbasket, or \"Choose Columns\" in Meds & Orders section of the refill encounter.              Passed - Medication is active on med list        Passed - Patient is age 18 or older          "

## 2020-03-05 ENCOUNTER — DOCUMENTATION ONLY (OUTPATIENT)
Dept: ENDOCRINOLOGY | Facility: CLINIC | Age: 70
End: 2020-03-05

## 2020-03-05 LAB
ALBUMIN/CREATININE RATIO: 156
CHOLEST SERPL-MCNC: 102 MG/DL
CREAT SERPL-MCNC: 0.92 MG/DL
HBA1C MFR BLD: 6.3 % (ref 0–5.6)
HDLC SERPL-MCNC: 42 MG/DL
LDL CHOLESTEROL: 49 MG/DL
TRIGL SERPL-MCNC: 56 MG/DL

## 2020-03-05 NOTE — PROGRESS NOTES
GRADE study visit     Patient is here for 60 month GRADE study visit. Patient is doing well and continues on Metformin 1000 mg bid in addition to randomized treatment of glimepiride.  He is currently taking 0.5 mg daily.  He has had a few episodes of symptomatic hypoglycemia, most recently while walking at the mall yesterday.  He has frequent glucose readings in the 70s and 80s.  No chest pain or shortness of breath.  He continues taking care of his young grandchildren.  Last a1c was 6.2%.        Lab results:   A1c: 6.3%     Plan:   1. Diabetes- A1c at target, but he is having too much symptomatic hypoglycemia.  I do not see he is deriving much benefit from such a low dose of glimepiride and it is increasing his risk for hypoglycemia, so we decided to stop it.  He will let me know if he has glucose readings over 150 mg/dL. No other changes today.   2. Follow up in 3 months, sooner with concerns.      Lulu Cortés PA-C, MPAS   [facility]  Department of Medicine  Division of Endocrinology and Diabetes

## 2020-03-13 ENCOUNTER — TRANSFERRED RECORDS (OUTPATIENT)
Dept: MULTI SPECIALTY CLINIC | Facility: CLINIC | Age: 70
End: 2020-03-13

## 2020-03-13 ENCOUNTER — OFFICE VISIT (OUTPATIENT)
Dept: FAMILY MEDICINE | Facility: CLINIC | Age: 70
End: 2020-03-13
Payer: COMMERCIAL

## 2020-03-13 VITALS
HEIGHT: 64 IN | HEART RATE: 53 BPM | WEIGHT: 131 LBS | DIASTOLIC BLOOD PRESSURE: 66 MMHG | BODY MASS INDEX: 22.36 KG/M2 | TEMPERATURE: 97.2 F | OXYGEN SATURATION: 98 % | SYSTOLIC BLOOD PRESSURE: 136 MMHG

## 2020-03-13 DIAGNOSIS — E78.5 HYPERLIPIDEMIA LDL GOAL <70: ICD-10-CM

## 2020-03-13 DIAGNOSIS — Z95.1 S/P CABG (CORONARY ARTERY BYPASS GRAFT): ICD-10-CM

## 2020-03-13 DIAGNOSIS — E11.59 TYPE 2 DIABETES MELLITUS WITH OTHER CIRCULATORY COMPLICATION, WITHOUT LONG-TERM CURRENT USE OF INSULIN (H): Primary | ICD-10-CM

## 2020-03-13 DIAGNOSIS — I10 BENIGN ESSENTIAL HYPERTENSION: ICD-10-CM

## 2020-03-13 DIAGNOSIS — K64.4 EXTERNAL HEMORRHOIDS: ICD-10-CM

## 2020-03-13 DIAGNOSIS — R05.9 COUGH: ICD-10-CM

## 2020-03-13 DIAGNOSIS — I71.43 ANEURYSM OF INFRARENAL ABDOMINAL AORTA (H): ICD-10-CM

## 2020-03-13 LAB — PTH-INTACT SERPL-MCNC: 14 PG/ML (ref 18–80)

## 2020-03-13 PROCEDURE — 84100 ASSAY OF PHOSPHORUS: CPT | Performed by: INTERNAL MEDICINE

## 2020-03-13 PROCEDURE — 80048 BASIC METABOLIC PNL TOTAL CA: CPT | Performed by: INTERNAL MEDICINE

## 2020-03-13 PROCEDURE — 99214 OFFICE O/P EST MOD 30 MIN: CPT | Performed by: INTERNAL MEDICINE

## 2020-03-13 PROCEDURE — 82043 UR ALBUMIN QUANTITATIVE: CPT | Performed by: INTERNAL MEDICINE

## 2020-03-13 PROCEDURE — 83970 ASSAY OF PARATHORMONE: CPT | Performed by: INTERNAL MEDICINE

## 2020-03-13 PROCEDURE — 36415 COLL VENOUS BLD VENIPUNCTURE: CPT | Performed by: INTERNAL MEDICINE

## 2020-03-13 RX ORDER — EPLERENONE 25 MG/1
25 TABLET, FILM COATED ORAL DAILY
Qty: 90 TABLET | Refills: 3 | Status: SHIPPED | OUTPATIENT
Start: 2020-03-13 | End: 2020-05-22

## 2020-03-13 RX ORDER — NITROGLYCERIN 0.4 MG/1
0.4 TABLET SUBLINGUAL SEE ADMIN INSTRUCTIONS
Qty: 25 TABLET | Refills: 3 | Status: SHIPPED | OUTPATIENT
Start: 2020-03-13 | End: 2020-08-07

## 2020-03-13 RX ORDER — LOSARTAN POTASSIUM 100 MG/1
100 TABLET ORAL DAILY
Qty: 90 TABLET | Refills: 3 | Status: SHIPPED | OUTPATIENT
Start: 2020-03-13 | End: 2020-05-22

## 2020-03-13 RX ORDER — FLUTICASONE PROPIONATE 50 MCG
1-2 SPRAY, SUSPENSION (ML) NASAL DAILY
Qty: 16 G | Refills: 11 | Status: SHIPPED | OUTPATIENT
Start: 2020-03-13

## 2020-03-13 RX ORDER — CARVEDILOL 3.12 MG/1
3.12 TABLET ORAL 2 TIMES DAILY WITH MEALS
Qty: 180 TABLET | Refills: 3 | Status: SHIPPED | OUTPATIENT
Start: 2020-03-13 | End: 2020-05-22

## 2020-03-13 ASSESSMENT — MIFFLIN-ST. JEOR: SCORE: 1280.21
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
## 2020-03-14 LAB
ANION GAP SERPL CALCULATED.3IONS-SCNC: 6 MMOL/L (ref 3–14)
BUN SERPL-MCNC: 19 MG/DL (ref 7–30)
CALCIUM SERPL-MCNC: 8.7 MG/DL (ref 8.5–10.1)
CHLORIDE SERPL-SCNC: 107 MMOL/L (ref 94–109)
CO2 SERPL-SCNC: 23 MMOL/L (ref 20–32)
CREAT SERPL-MCNC: 0.9 MG/DL (ref 0.66–1.25)
CREAT UR-MCNC: 145 MG/DL
GFR SERPL CREATININE-BSD FRML MDRD: 88 ML/MIN/{1.73_M2}
GLUCOSE SERPL-MCNC: 131 MG/DL (ref 70–99)
MICROALBUMIN UR-MCNC: 226 MG/L
MICROALBUMIN/CREAT UR: 155.86 MG/G CR (ref 0–17)
PHOSPHATE SERPL-MCNC: 2.6 MG/DL (ref 2.5–4.5)
POTASSIUM SERPL-SCNC: 4.1 MMOL/L (ref 3.4–5.3)
SODIUM SERPL-SCNC: 136 MMOL/L (ref 133–144)

## 2020-04-06 ENCOUNTER — TELEPHONE (OUTPATIENT)
Dept: FAMILY MEDICINE | Facility: CLINIC | Age: 70
End: 2020-04-06

## 2020-04-06 DIAGNOSIS — Z86.73 HISTORY OF STROKE: ICD-10-CM

## 2020-04-06 DIAGNOSIS — Z95.1 S/P CABG (CORONARY ARTERY BYPASS GRAFT): ICD-10-CM

## 2020-04-06 NOTE — TELEPHONE ENCOUNTER
"     clopidogrel (PLAVIX) 75 MG tablet  90 tablet  3  3/8/2019        Last Written Prescription Date:  03/08/2019  Last Fill Quantity: 90,  # refills: 3   Last office visit: 3/13/2020 with prescribing provider:     Future Office Visit:  Unknown    Requested Prescriptions   Pending Prescriptions Disp Refills     clopidogrel (PLAVIX) 75 MG tablet [Pharmacy Med Name: Clopidogrel Bisulfate Oral Tablet 75 MG] 90 tablet 2     Sig: TAKE 1 TABLET BY MOUTH EVERY DAY       Plavix Failed - 4/6/2020  2:20 PM        Failed - Normal HGB on file in past 12 months     Recent Labs   Lab Test 12/06/18  1651   HGB 13.1*               Failed - Normal Platelets on file in past 12 months     Recent Labs   Lab Test 12/06/18  1651                  Passed - No active PPI on record unless is Protonix        Passed - Recent (12 mo) or future (30 days) visit within the authorizing provider's specialty     Patient has had an office visit with the authorizing provider or a provider within the authorizing providers department within the previous 12 mos or has a future within next 30 days. See \"Patient Info\" tab in inbasket, or \"Choose Columns\" in Meds & Orders section of the refill encounter.              Passed - Medication is active on med list        Passed - Patient is age 18 or older             "

## 2020-04-06 NOTE — TELEPHONE ENCOUNTER
Reason for Call:  Medication or medication refill:    Do you use a [facility]?  Name of the pharmacy and phone number for the current request:  [id number]  [place], [place] - [address]    Name of the medication requested: clopidogrel (PLAVIX) 75 MG tablet  And clopidogrel (PLAVIX) 75 MG tablet     Other request: Patient is requesting 90 day refill.   [facility]     Can we leave a detailed message on this number? YES    Phone number patient can be reached at: Home number on file [phone] (home)    Best Time: any      Call taken on 4/6/2020 at 2:15 PM by Hue Hager

## 2020-04-07 RX ORDER — CLOPIDOGREL BISULFATE 75 MG/1
TABLET ORAL
Qty: 90 TABLET | Refills: 2 | Status: SHIPPED | OUTPATIENT
Start: 2020-04-07 | End: 2020-06-30

## 2020-04-07 NOTE — TELEPHONE ENCOUNTER
Routing refill request to provider for review/approval because:  Labs not current:  Hgb, PLT    VERONICA Jim, RN  [facility]

## 2020-04-08 DIAGNOSIS — I10 BENIGN ESSENTIAL HYPERTENSION: ICD-10-CM

## 2020-04-08 NOTE — TELEPHONE ENCOUNTER
"amLODIPine (NORVASC) 5 MG tablet  180 tablet  3  3/8/2019   No    Sig - Route: Take 1 tablet (5 mg) by mouth 2 times daily - Oral     Last Written Prescription Date:  3-8-2019  Last Fill Quantity: 180,  # refills: 3   Last office visit: 3/13/2020 with prescribing provider:     Future Office Visit: 8-3-2020, Dr. Mckee    Requested Prescriptions   Pending Prescriptions Disp Refills     amLODIPine (NORVASC) 5 MG tablet [Pharmacy Med Name: AMLODIPINE BESYLATE TABS 5MG] 180 tablet 3     Sig: TAKE 1 TABLET TWICE A DAY       Calcium Channel Blockers Protocol  Passed - 4/8/2020 11:35 AM        Passed - Blood pressure under 140/90 in past 12 months     BP Readings from Last 3 Encounters:   03/13/20 136/66   09/13/19 137/71   06/10/19 142/70                 Passed - Recent (12 mo) or future (30 days) visit within the authorizing provider's specialty     Patient has had an office visit with the authorizing provider or a provider within the authorizing providers department within the previous 12 mos or has a future within next 30 days. See \"Patient Info\" tab in inbasket, or \"Choose Columns\" in Meds & Orders section of the refill encounter.              Passed - Medication is active on med list        Passed - Patient is age 18 or older        Passed - Normal serum creatinine on file in past 12 months     Recent Labs   Lab Test 03/13/20  1211  02/28/19   CR 0.90   < >  --    CREAT  --   --  0.9    < > = values in this interval not displayed.       Ok to refill medication if creatinine is low                 " normal...

## 2020-04-09 RX ORDER — AMLODIPINE BESYLATE 5 MG/1
TABLET ORAL
Qty: 180 TABLET | Refills: 1 | Status: SHIPPED | OUTPATIENT
Start: 2020-04-09 | End: 2020-08-26

## 2020-05-07 ENCOUNTER — DOCUMENTATION ONLY (OUTPATIENT)
Dept: ENDOCRINOLOGY | Facility: CLINIC | Age: 70
End: 2020-05-07

## 2020-05-07 NOTE — PROGRESS NOTES
GRADE study visit     Patient is on the phone for 63 month GRADE study visit. Patient is doing well and continues on Metformin 1000 mg bid in addition to randomized treatment of glimepiride.  He is currently taking 0.5 mg daily.  He has had no hypoglycemia.  He continues taking care of his young grandchildren, but is otherwise social distancing.  Last a1c was 6.3%. His last GFR was >90.         Plan:   1. Diabetes- Doing well.  Discussed COVID_19 precautions.  No other changes today.   2. Follow up in 3 months, sooner with concerns.      Lulu Cortés PA-C, MPAS   [facility]  Department of Medicine  Division of Endocrinology and Diabetes

## 2020-05-21 DIAGNOSIS — I10 BENIGN ESSENTIAL HYPERTENSION: ICD-10-CM

## 2020-05-21 DIAGNOSIS — Z95.1 S/P CABG (CORONARY ARTERY BYPASS GRAFT): ICD-10-CM

## 2020-05-22 RX ORDER — EPLERENONE 25 MG/1
TABLET, FILM COATED ORAL
Qty: 90 TABLET | Refills: 3 | Status: SHIPPED | OUTPATIENT
Start: 2020-05-22

## 2020-05-22 RX ORDER — CARVEDILOL 3.12 MG/1
TABLET ORAL
Qty: 180 TABLET | Refills: 3 | Status: SHIPPED | OUTPATIENT
Start: 2020-05-22

## 2020-05-22 RX ORDER — LOSARTAN POTASSIUM 100 MG/1
TABLET ORAL
Qty: 90 TABLET | Refills: 3 | Status: SHIPPED | OUTPATIENT
Start: 2020-05-22

## 2020-06-29 DIAGNOSIS — Z95.1 S/P CABG (CORONARY ARTERY BYPASS GRAFT): ICD-10-CM

## 2020-06-29 DIAGNOSIS — Z86.73 HISTORY OF STROKE: ICD-10-CM

## 2020-06-30 RX ORDER — CLOPIDOGREL BISULFATE 75 MG/1
75 TABLET ORAL DAILY
Qty: 90 TABLET | Refills: 1 | Status: SHIPPED | OUTPATIENT
Start: 2020-06-30 | End: 2020-12-10

## 2020-06-30 NOTE — TELEPHONE ENCOUNTER
Called pt, spoke with on C2C - Brianna     [facility]    Refills available at [facility]     Called to verify where he wants Rx at     Pt wants at \[facility]\"" pharmacy instead     Sent per remaining refills    Gracie PARRA RN

## 2020-08-06 ENCOUNTER — DOCUMENTATION ONLY (OUTPATIENT)
Dept: ENDOCRINOLOGY | Facility: CLINIC | Age: 70
End: 2020-08-06

## 2020-08-06 NOTE — PROGRESS NOTES
GRADE study visit     Patient is here for 66 month GRADE study visit. Patient is doing well and continues on Metformin 1000 mg bid in addition to randomized treatment of glimepiride.  He is currently taking 0.5 mg daily.  He has had no hypoglycemia.  He continues taking care of his young grandchildren, but is otherwise social distancing.  Last a1c was 6.3%. His last GFR was >90. He is moving to [place] with his family in October.       Plan:   1. Diabetes- Doing well.  Discussed COVID_19 precautions.  No other changes today.   2. Follow up in 6 weeks, prior to move for his final visit, sooner with concerns.      Lulu Cortés PA-C, MPAS   [facility]  Department of Medicine  Division of Endocrinology and Diabetes

## 2020-08-07 ENCOUNTER — VIRTUAL VISIT (OUTPATIENT)
Dept: FAMILY MEDICINE | Facility: CLINIC | Age: 70
End: 2020-08-07
Payer: COMMERCIAL

## 2020-08-07 DIAGNOSIS — Z12.5 SCREENING PSA (PROSTATE SPECIFIC ANTIGEN): ICD-10-CM

## 2020-08-07 DIAGNOSIS — E78.5 HYPERLIPIDEMIA LDL GOAL <70: ICD-10-CM

## 2020-08-07 DIAGNOSIS — I1A.0 RESISTANT HYPERTENSION: ICD-10-CM

## 2020-08-07 DIAGNOSIS — Z95.1 S/P CABG (CORONARY ARTERY BYPASS GRAFT): ICD-10-CM

## 2020-08-07 DIAGNOSIS — E11.59 TYPE 2 DIABETES MELLITUS WITH VASCULAR DISEASE (H): Primary | ICD-10-CM

## 2020-08-07 DIAGNOSIS — I25.10 CORONARY ARTERY DISEASE INVOLVING NATIVE CORONARY ARTERY OF NATIVE HEART WITHOUT ANGINA PECTORIS: ICD-10-CM

## 2020-08-07 DIAGNOSIS — Z12.11 SPECIAL SCREENING FOR MALIGNANT NEOPLASMS, COLON: ICD-10-CM

## 2020-08-07 LAB
HBA1C MFR BLD: 6.5 % (ref 0–5.6)
MICROALBUMIN - QUEST: 154.29

## 2020-08-07 PROCEDURE — 99214 OFFICE O/P EST MOD 30 MIN: CPT | Mod: 95 | Performed by: INTERNAL MEDICINE

## 2020-08-07 RX ORDER — ROSUVASTATIN CALCIUM 20 MG/1
20 TABLET, COATED ORAL DAILY
Qty: 90 TABLET | Refills: 3 | Status: SHIPPED | OUTPATIENT
Start: 2020-08-07

## 2020-08-07 RX ORDER — NITROGLYCERIN 0.4 MG/1
0.4 TABLET SUBLINGUAL SEE ADMIN INSTRUCTIONS
Qty: 25 TABLET | Refills: 3 | Status: SHIPPED | OUTPATIENT
Start: 2020-08-07

## 2020-08-07 RX ORDER — GLIMEPIRIDE 1 MG/1
1 TABLET ORAL
COMMUNITY
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
## 2020-08-25 DIAGNOSIS — I10 BENIGN ESSENTIAL HYPERTENSION: ICD-10-CM

## 2020-08-26 RX ORDER — AMLODIPINE BESYLATE 5 MG/1
TABLET ORAL
Qty: 180 TABLET | Refills: 3 | Status: SHIPPED | OUTPATIENT
Start: 2020-08-26

## 2020-08-26 NOTE — TELEPHONE ENCOUNTER
Prescription approved per [facility]ill Protocol.  Vanesa Villar RN on 8/26/2020 at 2:52 PM

## 2020-09-21 DIAGNOSIS — I10 BENIGN ESSENTIAL HYPERTENSION: ICD-10-CM

## 2020-09-22 DIAGNOSIS — E78.5 HYPERLIPIDEMIA LDL GOAL <70: ICD-10-CM

## 2020-09-22 DIAGNOSIS — E11.59 TYPE 2 DIABETES MELLITUS WITH VASCULAR DISEASE (H): ICD-10-CM

## 2020-09-22 DIAGNOSIS — I1A.0 RESISTANT HYPERTENSION: ICD-10-CM

## 2020-09-22 DIAGNOSIS — E11.59 TYPE 2 DIABETES MELLITUS WITH OTHER CIRCULATORY COMPLICATION, WITHOUT LONG-TERM CURRENT USE OF INSULIN (H): ICD-10-CM

## 2020-09-22 LAB
ALBUMIN SERPL-MCNC: 4 G/DL (ref 3.4–5)
ALP SERPL-CCNC: 47 U/L (ref 40–150)
ALT SERPL W P-5'-P-CCNC: 25 U/L (ref 0–70)
ANION GAP SERPL CALCULATED.3IONS-SCNC: 7 MMOL/L (ref 3–14)
AST SERPL W P-5'-P-CCNC: 14 U/L (ref 0–45)
BILIRUB SERPL-MCNC: 0.9 MG/DL (ref 0.2–1.3)
BUN SERPL-MCNC: 12 MG/DL (ref 7–30)
CALCIUM SERPL-MCNC: 9.2 MG/DL (ref 8.5–10.1)
CHLORIDE SERPL-SCNC: 104 MMOL/L (ref 94–109)
CHOLEST SERPL-MCNC: 97 MG/DL
CO2 SERPL-SCNC: 25 MMOL/L (ref 20–32)
CREAT SERPL-MCNC: 0.94 MG/DL (ref 0.66–1.25)
CREAT UR-MCNC: 125 MG/DL
GFR SERPL CREATININE-BSD FRML MDRD: 84 ML/MIN/{1.73_M2}
GLUCOSE SERPL-MCNC: 122 MG/DL (ref 70–99)
HBA1C MFR BLD: 8 % (ref 0–5.6)
HDLC SERPL-MCNC: 45 MG/DL
LDLC SERPL CALC-MCNC: 37 MG/DL
MICROALBUMIN UR-MCNC: 237 MG/L
MICROALBUMIN/CREAT UR: 189.6 MG/G CR (ref 0–17)
NONHDLC SERPL-MCNC: 52 MG/DL
POTASSIUM SERPL-SCNC: 3.9 MMOL/L (ref 3.4–5.3)
PROT SERPL-MCNC: 7.6 G/DL (ref 6.8–8.8)
SODIUM SERPL-SCNC: 136 MMOL/L (ref 133–144)
TRIGL SERPL-MCNC: 74 MG/DL
TSH SERPL DL<=0.005 MIU/L-ACNC: 1.24 MU/L (ref 0.4–4)

## 2020-09-22 PROCEDURE — 80061 LIPID PANEL: CPT | Performed by: INTERNAL MEDICINE

## 2020-09-22 PROCEDURE — 83036 HEMOGLOBIN GLYCOSYLATED A1C: CPT | Performed by: INTERNAL MEDICINE

## 2020-09-22 PROCEDURE — 84443 ASSAY THYROID STIM HORMONE: CPT | Performed by: INTERNAL MEDICINE

## 2020-09-22 PROCEDURE — 82043 UR ALBUMIN QUANTITATIVE: CPT | Performed by: INTERNAL MEDICINE

## 2020-09-22 PROCEDURE — 36415 COLL VENOUS BLD VENIPUNCTURE: CPT | Performed by: INTERNAL MEDICINE

## 2020-09-22 PROCEDURE — 80053 COMPREHEN METABOLIC PANEL: CPT | Performed by: INTERNAL MEDICINE

## 2020-09-22 RX ORDER — DOXAZOSIN 4 MG/1
TABLET ORAL
Qty: 180 TABLET | Refills: 1 | Status: SHIPPED | OUTPATIENT
Start: 2020-09-22

## 2020-09-22 NOTE — TELEPHONE ENCOUNTER
Cardura:   Two Rx's written 9/13/2019 - 6mg QD and 4mg BID    Tried to call pt   Unable to leave VM  Sent MyChart to confirm current dose    Lucia CROSS RN

## 2020-10-01 ENCOUNTER — DOCUMENTATION ONLY (OUTPATIENT)
Dept: ENDOCRINOLOGY | Facility: CLINIC | Age: 70
End: 2020-10-01

## 2020-10-01 LAB — HBA1C MFR BLD: 6.3 % (ref 0–5.6)

## 2020-10-01 NOTE — PROGRESS NOTES
GRADE study visit     Patient is here for 66 month GRADE study visit. Patient is doing well and continues on Metformin 1000 mg bid in addition to randomized treatment of glimepiride.  He has been off the glimepiride for the past month and blood sugars have been well controlled. He went to a wedding in [place] in August with 250 people, but everyone wore a mask.  He has not had any illnesses lately. Last a1c was 6.5%. His last GFR was >80. He is moving to [place] with his family in October, so today is his last in person GRADE visit.       Results:   A1c: pending    Plan:   1. Diabetes- Doing well. Asked him to test glucose later in the day, as a1c with PCP was much higher than his recent glucose would suggest. He will send glucose values next week. A1c pending today.  Discussed COVID_19 precautions.  No other changes today.   2. Follow up via phone in 3 months for final visit, sooner with concerns.      Lulu Cortés PA-C, MPAS   [facility]  Department of Medicine  Division of Endocrinology and Diabetes

## 2020-11-02 ENCOUNTER — TRANSFERRED RECORDS (OUTPATIENT)
Dept: MULTI SPECIALTY CLINIC | Facility: CLINIC | Age: 70
End: 2020-11-02

## 2020-11-02 LAB
RETINOPATHY: NEGATIVE
RETINOPATHY: NORMAL

## 2020-11-16 ENCOUNTER — HEALTH MAINTENANCE LETTER (OUTPATIENT)
Age: 70
End: 2020-11-16

## 2020-12-07 ENCOUNTER — TELEPHONE (OUTPATIENT)
Dept: FAMILY MEDICINE | Facility: CLINIC | Age: 70
End: 2020-12-07

## 2020-12-07 NOTE — TELEPHONE ENCOUNTER
Please abstract the following data from this visit with this patient into the appropriate field in Epic:    Tests that can be patient reported without a hard copy:    Eye exam with ophthalmology on this date: 11/02/2020 at [facility]    Kayy Johns CMA

## 2020-12-08 DIAGNOSIS — Z86.73 HISTORY OF STROKE: ICD-10-CM

## 2020-12-08 DIAGNOSIS — Z95.1 S/P CABG (CORONARY ARTERY BYPASS GRAFT): ICD-10-CM

## 2020-12-10 RX ORDER — CLOPIDOGREL BISULFATE 75 MG/1
TABLET ORAL
Qty: 90 TABLET | Refills: 1 | Status: SHIPPED | OUTPATIENT
Start: 2020-12-10

## 2020-12-10 NOTE — TELEPHONE ENCOUNTER
Routing refill request to provider for review/approval because:  Labs out of range:  Hgb  Labs not current:  HGB, PLT last 2018  Please authorize if appropriate.  Thanks,  Cassandra Pierre RN

## 2020-12-28 ENCOUNTER — TELEPHONE (OUTPATIENT)
Dept: FAMILY MEDICINE | Facility: CLINIC | Age: 70
End: 2020-12-28

## 2020-12-28 NOTE — TELEPHONE ENCOUNTER
"PCP see below   Referrals forwarded this to triage    Please advise     Gracie PARRA RN      Ning Hernandez A routed conversation to [facility] Triage 2 hours ago (8:23 AM)      Mati Pulliam \"Balta\"  Patient Referral Request Pool 19 hours ago (3:13 PM)        Mati Pulliam would like to request a referral.  Reason: Need a referral  Requested provider: Dr Milagro Pope, [place], [place]  Comment:  [facility] and myself moved to [facility]. As requested by you, I tried calling Dr. Milagro Pope at .  There are three doctors at the same number and left messages for Dr. Pope several times. Finally received a call back from  stating that Dr. Pope is not taking any new patients, though we mentioned that Dr. Mckee from [place] recommended this doctor. I will provide her number and address. Really appreciate if you can refer us  to Dr. Milagro Pope.     Thank you so much,     Balta Pope  Tel:[phone], F ax: [phone].  Address:  [address] [address]       "

## 2021-02-04 ENCOUNTER — DOCUMENTATION ONLY (OUTPATIENT)
Dept: ENDOCRINOLOGY | Facility: CLINIC | Age: 71
End: 2021-02-04

## 2021-02-04 NOTE — LETTER
Patient:  Mati Pulliam  :   1950  MRN:     [id number]        2021    Dear Dr. Pope,       As you may recall, Balta Pulliam has been a participant in the [place] Study.  GRADE is a multicenter diabetes study in the [place] funded by the [facility] with a goal of determining whether there is an optimal combination of medications for treating type 2 diabetes.  All participants in GRADE were prescribed metformin and were initially randomized to one of four additional diabetes medications:   (1) glimepiride (Amaryl), (2) insulin glargine (Lantus), (3) liraglutide (Victoza) or (4) sitagliptin (Januvia).  Balta will be returning to you for diabetes management from the GRADE Study. We want to communicate the following at this time:    His last [facility] study visit was 21 (via phone)    He was given a 30 day supply of diabetes medication(s) and was instructed to make a follow-up appointment with you within 30 days for  new diabetes medication prescriptions    Attached is a report of his [facility] Study progress including labwork and medication history      Below is a brief summary of your patient's diabetes progression and salient clinical concerns during their enrollment in [facility]:     Balta enrolled in the GRADE study on 2015.  He was randomized to receive metformin and glimepiride.  Over the course of the study, Balta did quite well and was able to maintain his a1c <7%.  He did experience several episodes of hypoglycemia with activity, so his glimepiride dose had been stopped/reduced throughout the study.     Balta's A1c value at his last [facility] study visit was 6.3%.  Currently Balta is taking:   Metformin 1000 mg bid  Glimpiride 0.5 mg QAM    The American Diabetes Association (ADA) recommends individualizing A1c glycemic targets for people with type 2 diabetes.  Factors to consider include duration of diabetes, presence of other important chronic conditions, especially cardiovascular conditions, potential risk of medication  side effects such as hypoglycemia, patient preference and cost of medications.  For many people, an A1c target < 7% may be appropriate.  However, depending on factors above, a more relaxed target of < 8% may be acceptable.    The ADA recommends metformin as the first line medication for treating type 2 diabetes.  When a second medication is needed, factors which might influence choice include presence of cardiovascular or renal disease, risk of hypoglycemia, desire for weight neutral or weight loss effects, patient preference and cost.  The ADA Standards of Care (https://professional.diabetes.org/) are a resource for providers in the management of type 2 diabetes.  We hope that when the results of the GRADE study are available, they will provide further guidance for physicians and patients in the choice of medication regimens.      The results of the GRADE Study will be available at the end of 2021.  We will be providing GRADE participants the main findings of the trial at that time prior to release to the general public.   We greatly appreciate the opportunity to participate in [facility]'s diabetes care over the course of the GRADE study, and we appreciate [facility]'s donation of time and effort as a participant in the study.  Please feel free to contact us at [phone] if you have questions or if we can provide further information during this transition.    Sincerely,        Lulu Cortés PA-C, MPAS  GRADE Study Investigator

## 2021-02-04 NOTE — PROGRESS NOTES
GRADE study visit     Patient is on the phone for his final GRADE study visit. He moved to [place] recently, so he was unable to come in for his final visit. Patient is doing well and continues on Metformin 1000 mg bid in addition to randomized treatment of glimepiride.  He has been off of the morning dose of metformin for a few weeks because he was concerned about some problems with metformin in the news.  He takes glimepiride 1/2 tablet in the morning. He has been doing more walking since he moved to [place].  Last a1c was 6.3%. His last GFR was >80.     Results:   A1c: pending    Plan:   1. Diabetes- Doing well.  He will follow up with his primary care provider in [place] to establish care.      Lulu Cortés PA-C, MPAS   [facility]  Department of Medicine  Division of Endocrinology and Diabetes

## 2021-02-16 NOTE — PROGRESS NOTES
Subjective:    HPI                    Objective:    System    Physical Exam    General     ROS    Assessment/Plan:      DISCHARGE REPORT    Progress reporting period is from 1/2/2017 to 1/23/2017.       SUBJECTIVE  Subjective changes noted by patient:  Pt reports that he doesn't wake up at night anymore due to neck pain and he hasn't had difficulty looking over his shoulder to drive.  Pain levels have been intermittently at a 1-2/10.  Pt is independent with his HEP.  Current pain level is 1/10  .     Previous pain level was  3/10  .   Changes in function:  Yes (See Goal flowsheet attached for changes in current functional level)  Adverse reaction to treatment or activity: None    OBJECTIVE  Changes noted in objective findings:  Yes, see below.  Objective: cervical AROM flex WNL, ext WNL, B rotation WNL, R SB WNL, L SB WNL (slight pain at top of neck).     ASSESSMENT/PLAN  STG/LTGs have been met or progress has been made towards goals:  Yes (See Goal flow sheet completed today.)  Assessment of Progress: The patient has met all of their long term goals.  Self Management Plans:  Patient is independent in a home treatment program.  I have re-evaluated this patient and find that the nature, scope, duration and intensity of the therapy is appropriate for the medical condition of the patient.  Mati continues to require the following intervention to meet STG and LTG's:  PT intervention is no longer required to meet STG/LTG.    Recommendations:  This patient is ready to be discharged from therapy and continue their home treatment program.    Please refer to the daily flowsheet for treatment today, total treatment time and time spent performing 1:1 timed codes.                
Detail Level: Detailed
Detail Level: Generalized

## 2021-04-03 ENCOUNTER — HEALTH MAINTENANCE LETTER (OUTPATIENT)
Age: 71
End: 2021-04-03

## 2021-09-18 ENCOUNTER — HEALTH MAINTENANCE LETTER (OUTPATIENT)
Age: 71
End: 2021-09-18

## 2021-11-13 ENCOUNTER — HEALTH MAINTENANCE LETTER (OUTPATIENT)
Age: 71
End: 2021-11-13

## 2022-04-30 ENCOUNTER — HEALTH MAINTENANCE LETTER (OUTPATIENT)
Age: 72
End: 2022-04-30

## 2022-06-25 ENCOUNTER — HEALTH MAINTENANCE LETTER (OUTPATIENT)
Age: 72
End: 2022-06-25

## 2022-11-19 ENCOUNTER — HEALTH MAINTENANCE LETTER (OUTPATIENT)
Age: 72
End: 2022-11-19

## 2023-04-15 ENCOUNTER — HEALTH MAINTENANCE LETTER (OUTPATIENT)
Age: 73
End: 2023-04-15

## 2023-06-01 ENCOUNTER — HEALTH MAINTENANCE LETTER (OUTPATIENT)
Age: 73
End: 2023-06-01

## 2023-11-19 ENCOUNTER — HEALTH MAINTENANCE LETTER (OUTPATIENT)
Age: 73
End: 2023-11-19

## (undated) RX ORDER — REGADENOSON 0.08 MG/ML
INJECTION, SOLUTION INTRAVENOUS
Status: DISPENSED
Start: 2018-08-09